# Patient Record
Sex: MALE | Race: BLACK OR AFRICAN AMERICAN | NOT HISPANIC OR LATINO | Employment: OTHER | URBAN - METROPOLITAN AREA
[De-identification: names, ages, dates, MRNs, and addresses within clinical notes are randomized per-mention and may not be internally consistent; named-entity substitution may affect disease eponyms.]

---

## 2017-02-16 RX ORDER — TADALAFIL 20 MG
TABLET ORAL
Qty: 9 TABLET | Refills: 0 | Status: SHIPPED | OUTPATIENT
Start: 2017-02-16 | End: 2018-03-07 | Stop reason: SDUPTHER

## 2018-03-07 ENCOUNTER — TELEPHONE (OUTPATIENT)
Dept: FAMILY MEDICINE CLINIC | Facility: CLINIC | Age: 58
End: 2018-03-07

## 2018-03-07 ENCOUNTER — OFFICE VISIT (OUTPATIENT)
Dept: FAMILY MEDICINE CLINIC | Facility: CLINIC | Age: 58
End: 2018-03-07

## 2018-03-07 VITALS
BODY MASS INDEX: 25.84 KG/M2 | OXYGEN SATURATION: 99 % | WEIGHT: 218.8 LBS | TEMPERATURE: 97.4 F | HEIGHT: 77 IN | DIASTOLIC BLOOD PRESSURE: 88 MMHG | SYSTOLIC BLOOD PRESSURE: 138 MMHG | HEART RATE: 95 BPM

## 2018-03-07 DIAGNOSIS — I10 ESSENTIAL HYPERTENSION: ICD-10-CM

## 2018-03-07 DIAGNOSIS — N52.9 ERECTILE DYSFUNCTION, UNSPECIFIED ERECTILE DYSFUNCTION TYPE: ICD-10-CM

## 2018-03-07 DIAGNOSIS — Z72.0 TOBACCO ABUSE: ICD-10-CM

## 2018-03-07 DIAGNOSIS — J44.9 CHRONIC OBSTRUCTIVE PULMONARY DISEASE, UNSPECIFIED COPD TYPE (HCC): ICD-10-CM

## 2018-03-07 DIAGNOSIS — R74.8 ELEVATED LIVER ENZYMES: ICD-10-CM

## 2018-03-07 DIAGNOSIS — E87.1 HYPONATREMIA: ICD-10-CM

## 2018-03-07 DIAGNOSIS — R73.9 HYPERGLYCEMIA: Primary | ICD-10-CM

## 2018-03-07 DIAGNOSIS — R06.02 SHORTNESS OF BREATH: ICD-10-CM

## 2018-03-07 DIAGNOSIS — J20.9 ACUTE BRONCHITIS, UNSPECIFIED ORGANISM: Primary | ICD-10-CM

## 2018-03-07 LAB
ALBUMIN SERPL-MCNC: 3.8 G/DL (ref 3.5–5.2)
ALBUMIN/GLOB SERPL: 1.3 G/DL
ALP SERPL-CCNC: 69 U/L (ref 39–117)
ALT SERPL W P-5'-P-CCNC: 77 U/L (ref 1–41)
ANION GAP SERPL CALCULATED.3IONS-SCNC: 10 MMOL/L
AST SERPL-CCNC: 39 U/L (ref 1–40)
BASOPHILS # BLD AUTO: 0.03 10*3/MM3 (ref 0–0.2)
BASOPHILS NFR BLD AUTO: 0.4 % (ref 0–1.5)
BILIRUB SERPL-MCNC: 1.4 MG/DL (ref 0.1–1.2)
BUN BLD-MCNC: 11 MG/DL (ref 6–20)
BUN/CREAT SERPL: 8.2 (ref 7–25)
CALCIUM SPEC-SCNC: 9.6 MG/DL (ref 8.6–10.5)
CHLORIDE SERPL-SCNC: 92 MMOL/L (ref 98–107)
CHOLEST SERPL-MCNC: 137 MG/DL (ref 0–200)
CO2 SERPL-SCNC: 27 MMOL/L (ref 22–29)
CREAT BLD-MCNC: 1.34 MG/DL (ref 0.76–1.27)
DEPRECATED RDW RBC AUTO: 45.2 FL (ref 37–54)
EOSINOPHIL # BLD AUTO: 0.08 10*3/MM3 (ref 0–0.7)
EOSINOPHIL NFR BLD AUTO: 1.2 % (ref 0.3–6.2)
ERYTHROCYTE [DISTWIDTH] IN BLOOD BY AUTOMATED COUNT: 13.2 % (ref 11.5–14.5)
GFR SERPL CREATININE-BSD FRML MDRD: 67 ML/MIN/1.73
GLOBULIN UR ELPH-MCNC: 3 GM/DL
GLUCOSE BLD-MCNC: 122 MG/DL (ref 65–99)
HBA1C MFR BLD: 5.11 % (ref 4.8–5.6)
HCT VFR BLD AUTO: 39.3 % (ref 40.4–52.2)
HDLC SERPL-MCNC: 76 MG/DL (ref 40–60)
HGB BLD-MCNC: 13.1 G/DL (ref 13.7–17.6)
IMM GRANULOCYTES # BLD: 0.02 10*3/MM3 (ref 0–0.03)
IMM GRANULOCYTES NFR BLD: 0.3 % (ref 0–0.5)
LDLC SERPL CALC-MCNC: 52 MG/DL (ref 0–100)
LDLC/HDLC SERPL: 0.69 {RATIO}
LYMPHOCYTES # BLD AUTO: 1.93 10*3/MM3 (ref 0.9–4.8)
LYMPHOCYTES NFR BLD AUTO: 28.9 % (ref 19.6–45.3)
MCH RBC QN AUTO: 31.2 PG (ref 27–32.7)
MCHC RBC AUTO-ENTMCNC: 33.3 G/DL (ref 32.6–36.4)
MCV RBC AUTO: 93.6 FL (ref 79.8–96.2)
MONOCYTES # BLD AUTO: 0.96 10*3/MM3 (ref 0.2–1.2)
MONOCYTES NFR BLD AUTO: 14.4 % (ref 5–12)
NEUTROPHILS # BLD AUTO: 3.65 10*3/MM3 (ref 1.9–8.1)
NEUTROPHILS NFR BLD AUTO: 54.8 % (ref 42.7–76)
NT-PROBNP SERPL-MCNC: 4291 PG/ML (ref 5–900)
PLATELET # BLD AUTO: 271 10*3/MM3 (ref 140–500)
PMV BLD AUTO: 9.9 FL (ref 6–12)
POTASSIUM BLD-SCNC: 4.2 MMOL/L (ref 3.5–5.2)
PROT SERPL-MCNC: 6.8 G/DL (ref 6–8.5)
RBC # BLD AUTO: 4.2 10*6/MM3 (ref 4.6–6)
SODIUM BLD-SCNC: 129 MMOL/L (ref 136–145)
TRIGL SERPL-MCNC: 43 MG/DL (ref 0–150)
VLDLC SERPL-MCNC: 8.6 MG/DL (ref 5–40)
WBC NRBC COR # BLD: 6.67 10*3/MM3 (ref 4.5–10.7)

## 2018-03-07 PROCEDURE — 80053 COMPREHEN METABOLIC PANEL: CPT | Performed by: NURSE PRACTITIONER

## 2018-03-07 PROCEDURE — 85025 COMPLETE CBC W/AUTO DIFF WBC: CPT | Performed by: NURSE PRACTITIONER

## 2018-03-07 PROCEDURE — 99214 OFFICE O/P EST MOD 30 MIN: CPT | Performed by: NURSE PRACTITIONER

## 2018-03-07 PROCEDURE — 71046 X-RAY EXAM CHEST 2 VIEWS: CPT | Performed by: NURSE PRACTITIONER

## 2018-03-07 PROCEDURE — 83880 ASSAY OF NATRIURETIC PEPTIDE: CPT | Performed by: NURSE PRACTITIONER

## 2018-03-07 PROCEDURE — 36415 COLL VENOUS BLD VENIPUNCTURE: CPT | Performed by: NURSE PRACTITIONER

## 2018-03-07 PROCEDURE — 80061 LIPID PANEL: CPT | Performed by: NURSE PRACTITIONER

## 2018-03-07 PROCEDURE — 83036 HEMOGLOBIN GLYCOSYLATED A1C: CPT | Performed by: NURSE PRACTITIONER

## 2018-03-07 RX ORDER — TADALAFIL 20 MG/1
20 TABLET ORAL DAILY PRN
Qty: 9 TABLET | Refills: 5 | Status: SHIPPED | OUTPATIENT
Start: 2018-03-07 | End: 2018-08-10 | Stop reason: SDUPTHER

## 2018-03-07 RX ORDER — BUPROPION HYDROCHLORIDE 150 MG/1
TABLET, EXTENDED RELEASE ORAL
Qty: 60 TABLET | Refills: 1 | Status: ON HOLD | OUTPATIENT
Start: 2018-03-07 | End: 2018-04-05 | Stop reason: SINTOL

## 2018-03-07 RX ORDER — AMLODIPINE BESYLATE 10 MG/1
10 TABLET ORAL DAILY
Qty: 30 TABLET | Refills: 5 | Status: ON HOLD | OUTPATIENT
Start: 2018-03-07 | End: 2018-04-05 | Stop reason: SINTOL

## 2018-03-07 NOTE — TELEPHONE ENCOUNTER
Your sodium is low 129 normal 135-145, your kidney function is sl decreased, make sure you are drinking 8 glasses H20 daily and healthy regular meals and RTC Friday lab only recheck. Chol well controlled. Your liver enzymes is sl elevated, do you take tylenol or drink alcohol regularly? Your BS sl elevated, enc healthy diet and regular exercise, no prediabetes or DM

## 2018-03-07 NOTE — PATIENT INSTRUCTIONS
check labs and call with results, refill amlodipine 10 mg daily and check BP at home, enc smoking cessation and trial wellbutrin  mg 1 PO AM x 3 days then BID, finish doxycycline and cont albuterol prn, trial breo inhaler 1 puff daily and gave samples and coupon, CXR today, call or RTC if sx persist or worsen

## 2018-03-07 NOTE — PROGRESS NOTES
Subjective   Hernando Caro is a 57 y.o. male.     History of Present Illness   Here to FU on bronchitis started having sx 4 wks ago saw [facility] and tx inhaler and amox helped for a while but sx never resolved, then went to the [facility] 1 wk ago medrol dose pack and doxycycline 100 BID, with some cont SOA and has to sleep, smoker 1/2 ppd x 20 years and not currently smoking, desires quitting, with HTN on amlodipine 10 mg daily fasting for labs today, no CP dizziness HA LE edema, no recent CXR but states heart enlarged prev with normal stress test, request refill cialis prn ED, last colonoscopy age 50 FU 10 years    The following portions of the patient's history were reviewed and updated as appropriate: allergies, current medications, past family history, past medical history, past social history, past surgical history and problem list.    Review of Systems   Constitutional: Negative for fever.   HENT: Positive for congestion and postnasal drip. Negative for dental problem, drooling, ear discharge, ear pain, facial swelling, hearing loss, mouth sores, nosebleeds, rhinorrhea, sinus pain, sinus pressure, sneezing, sore throat, tinnitus, trouble swallowing and voice change.    Respiratory: Positive for cough, chest tightness, shortness of breath and wheezing.    Cardiovascular: Negative for chest pain, palpitations and leg swelling.   Allergic/Immunologic: Positive for environmental allergies.   Neurological: Negative for dizziness and headaches.   All other systems reviewed and are negative.      Objective   Physical Exam   Constitutional: He is oriented to person, place, and time. He appears well-developed and well-nourished.   HENT:   Head: Normocephalic and atraumatic.   Eyes: Conjunctivae and EOM are normal. Pupils are equal, round, and reactive to light.   Neck: Normal range of motion. Neck supple. No thyromegaly present.   Cardiovascular: Normal rate, regular rhythm and normal heart sounds.     Pulmonary/Chest: Effort normal and breath sounds normal.   Musculoskeletal: Normal range of motion. He exhibits no edema (B LE).   Lymphadenopathy:     He has no cervical adenopathy.   Neurological: He is alert and oriented to person, place, and time.   Skin: Skin is warm and dry.   Psychiatric: He has a normal mood and affect. His behavior is normal. Judgment and thought content normal.   Vitals reviewed.  chest xray 2v without comparison for SOA, wheezing, coughing shows COPD, some heart enlargement, awaiting radiology review    Assessment/Plan   Hernando was seen today for bronchitis.    Diagnoses and all orders for this visit:    Acute bronchitis, unspecified organism  -     CBC & Differential  -     XR Chest PA & Lateral  -     CBC Auto Differential    Essential hypertension  -     Comprehensive Metabolic Panel  -     Lipid Panel  -     proBNP    Tobacco abuse  -     Lipid Panel    Shortness of breath    Chronic obstructive pulmonary disease, unspecified COPD type    Erectile dysfunction, unspecified erectile dysfunction type    Other orders  -     buPROPion SR (WELLBUTRIN SR) 150 MG 12 hr tablet; 1 PO AM x 3 days, then 1 PO BID  -     amLODIPine (NORVASC) 10 MG tablet; Take 1 tablet by mouth Daily.  -     tadalafil (CIALIS) 20 MG tablet; Take 1 tablet by mouth Daily As Needed for erectile dysfunction.    check labs and call with results, refill amlodipine 10 mg daily and check BP at home, enc smoking cessation and trial wellbutrin  mg 1 PO AM x 3 days then BID, finish doxycycline and cont albuterol prn, trial breo inhaler 1 puff daily and gave samples and coupon, CXR today, call or RTC if sx persist or worsen

## 2018-03-08 ENCOUNTER — TELEPHONE (OUTPATIENT)
Dept: FAMILY MEDICINE CLINIC | Facility: CLINIC | Age: 58
End: 2018-03-08

## 2018-03-08 NOTE — TELEPHONE ENCOUNTER
A WOMAN CALLED FROM THE NUMBER ON THIS PT'S CHART SAYING THAT THIS ISN'T HIS NUMBER    SHE DOES NOT KNOW THE PT FYI, BUT THAT THEY WERE LEAVING VOICE MAILS FROM NANCY SYLVESTER'S OFFICE    HIS EMERGENCY CONTACT IN HIS CHART DOES HAVE A DIFFERENT NUMBER IF SOMEONE NEEDS TO GET IN TOUCH WITH HIM THROUGH HER POSSIBLY    JENNIFER VICTOR [phone]

## 2018-03-12 ENCOUNTER — TELEPHONE (OUTPATIENT)
Dept: FAMILY MEDICINE CLINIC | Facility: CLINIC | Age: 58
End: 2018-03-12

## 2018-03-12 NOTE — TELEPHONE ENCOUNTER
Fluid volume in blood elevated, showing retaining fluid, please make FU apt this week to discuss and will need to start patient on diuretics, how is he feeling?

## 2018-03-14 ENCOUNTER — OFFICE VISIT (OUTPATIENT)
Dept: FAMILY MEDICINE CLINIC | Facility: CLINIC | Age: 58
End: 2018-03-14

## 2018-03-14 VITALS
DIASTOLIC BLOOD PRESSURE: 76 MMHG | HEART RATE: 95 BPM | BODY MASS INDEX: 25.27 KG/M2 | TEMPERATURE: 98.4 F | SYSTOLIC BLOOD PRESSURE: 148 MMHG | OXYGEN SATURATION: 96 % | HEIGHT: 77 IN | WEIGHT: 214 LBS

## 2018-03-14 DIAGNOSIS — E87.1 HYPONATREMIA: ICD-10-CM

## 2018-03-14 DIAGNOSIS — R74.8 ELEVATED LIVER ENZYMES: ICD-10-CM

## 2018-03-14 DIAGNOSIS — Z11.59 ENCOUNTER FOR HEPATITIS C SCREENING TEST FOR LOW RISK PATIENT: ICD-10-CM

## 2018-03-14 DIAGNOSIS — R35.0 URINE FREQUENCY: ICD-10-CM

## 2018-03-14 DIAGNOSIS — Z12.5 PROSTATE CANCER SCREENING: ICD-10-CM

## 2018-03-14 DIAGNOSIS — R06.02 SHORTNESS OF BREATH: ICD-10-CM

## 2018-03-14 DIAGNOSIS — I10 ESSENTIAL HYPERTENSION: Primary | ICD-10-CM

## 2018-03-14 DIAGNOSIS — Z78.9 ALCOHOL USE: ICD-10-CM

## 2018-03-14 DIAGNOSIS — R79.89 ELEVATED BRAIN NATRIURETIC PEPTIDE (BNP) LEVEL: ICD-10-CM

## 2018-03-14 LAB
ALBUMIN SERPL-MCNC: 3.6 G/DL (ref 3.5–5.2)
ALBUMIN/GLOB SERPL: 1.1 G/DL
ALP SERPL-CCNC: 82 U/L (ref 39–117)
ALT SERPL W P-5'-P-CCNC: 45 U/L (ref 1–41)
ANION GAP SERPL CALCULATED.3IONS-SCNC: 13.8 MMOL/L
AST SERPL-CCNC: 28 U/L (ref 1–40)
BILIRUB SERPL-MCNC: 1 MG/DL (ref 0.1–1.2)
BUN BLD-MCNC: 11 MG/DL (ref 6–20)
BUN/CREAT SERPL: 9.4 (ref 7–25)
CALCIUM SPEC-SCNC: 9.2 MG/DL (ref 8.6–10.5)
CHLORIDE SERPL-SCNC: 93 MMOL/L (ref 98–107)
CO2 SERPL-SCNC: 25.2 MMOL/L (ref 22–29)
CREAT BLD-MCNC: 1.17 MG/DL (ref 0.76–1.27)
GFR SERPL CREATININE-BSD FRML MDRD: 78 ML/MIN/1.73
GLOBULIN UR ELPH-MCNC: 3.4 GM/DL
GLUCOSE BLD-MCNC: 81 MG/DL (ref 65–99)
HAV IGM SERPL QL IA: NORMAL
HBV CORE IGM SERPL QL IA: NORMAL
HBV SURFACE AG SERPL QL IA: NORMAL
HCV AB SER DONR QL: NORMAL
POTASSIUM BLD-SCNC: 4 MMOL/L (ref 3.5–5.2)
PROT SERPL-MCNC: 7 G/DL (ref 6–8.5)
PSA SERPL-MCNC: 0.66 NG/ML (ref 0–4)
SODIUM BLD-SCNC: 132 MMOL/L (ref 136–145)

## 2018-03-14 PROCEDURE — 80074 ACUTE HEPATITIS PANEL: CPT | Performed by: NURSE PRACTITIONER

## 2018-03-14 PROCEDURE — G0103 PSA SCREENING: HCPCS | Performed by: NURSE PRACTITIONER

## 2018-03-14 PROCEDURE — 99214 OFFICE O/P EST MOD 30 MIN: CPT | Performed by: NURSE PRACTITIONER

## 2018-03-14 PROCEDURE — 36415 COLL VENOUS BLD VENIPUNCTURE: CPT | Performed by: NURSE PRACTITIONER

## 2018-03-14 PROCEDURE — 80053 COMPREHEN METABOLIC PANEL: CPT | Performed by: NURSE PRACTITIONER

## 2018-03-14 RX ORDER — FUROSEMIDE 20 MG/1
20 TABLET ORAL DAILY
Qty: 30 TABLET | Refills: 1 | Status: SHIPPED | OUTPATIENT
Start: 2018-03-14 | End: 2018-04-06 | Stop reason: HOSPADM

## 2018-03-14 RX ORDER — POTASSIUM CHLORIDE 750 MG/1
10 TABLET, EXTENDED RELEASE ORAL DAILY
Qty: 30 TABLET | Refills: 1 | Status: SHIPPED | OUTPATIENT
Start: 2018-03-14 | End: 2018-04-13

## 2018-03-14 NOTE — PROGRESS NOTES
Subjective   Hernando Caro is a 57 y.o. male.     History of Present Illness   Here to FU on cont SOA and HTN, states had swelling over the weekend and stopped amlodipine 10 mg daily, no CP dizziness HA LE edema, with hx of SE lisinopril angioedema, also stopped wellbutrin and still smoking, with last labs last week showing BNP > 4000, no hx of CHF or fam hx of CHF, last echo and stress test 2015 normal, with CXR showing cardiomegaly, with cont SOA and PATEL but states slowly improving, never took diuretic, with increased urin and nocturia, last PSA > 1 year, no fam hx of prostate ca, last colonoscopy age 50 FU 10 years, no abd pain, distension, NV, diarrhea, constipation or blood in stool, with labs showing low sodium, elevated creatinine and ALT, states drinks beer daily, no tylenol    The following portions of the patient's history were reviewed and updated as appropriate: allergies, current medications, past family history, past medical history, past social history, past surgical history and problem list.    Review of Systems   Constitutional: Negative for fever.   Respiratory: Positive for shortness of breath. Negative for cough and wheezing.    Cardiovascular: Negative for chest pain, palpitations and leg swelling.   Gastrointestinal: Negative for abdominal distention, abdominal pain, anal bleeding, blood in stool, constipation, diarrhea, nausea, rectal pain and vomiting.   Genitourinary: Positive for frequency and urgency. Negative for decreased urine volume, difficulty urinating, discharge, dysuria, enuresis, flank pain, genital sores, hematuria, penile pain, penile swelling, scrotal swelling and testicular pain.   Musculoskeletal: Negative for arthralgias, back pain, gait problem, joint swelling, myalgias, neck pain and neck stiffness.   Neurological: Negative for dizziness and headaches.   All other systems reviewed and are negative.      Objective   Physical Exam   Constitutional: He is oriented to person,  place, and time. He appears well-developed and well-nourished.   HENT:   Head: Normocephalic and atraumatic.   Eyes: Conjunctivae and EOM are normal. Pupils are equal, round, and reactive to light.   Cardiovascular: Normal rate, regular rhythm and normal heart sounds.    Pulmonary/Chest: Effort normal and breath sounds normal.   Abdominal: Soft. He exhibits no distension and no mass. There is no tenderness. There is no rebound and no guarding. No hernia.   Genitourinary: Prostate normal.   Musculoskeletal: Normal range of motion. He exhibits no edema (B LE).   Neurological: He is alert and oriented to person, place, and time.   Skin: Skin is warm and dry.   Psychiatric: He has a normal mood and affect. His behavior is normal. Judgment and thought content normal.   Vitals reviewed.      Assessment/Plan   Hernando was seen today for follow-up.    Diagnoses and all orders for this visit:    Essential hypertension  -     Adult Transthoracic Echo Complete W/ Cont if Necessary Per Protocol; Future    Elevated brain natriuretic peptide (BNP) level  -     Adult Transthoracic Echo Complete W/ Cont if Necessary Per Protocol; Future    Shortness of breath  -     Adult Transthoracic Echo Complete W/ Cont if Necessary Per Protocol; Future    Encounter for hepatitis C screening test for low risk patient    Prostate cancer screening  -     PSA Screen    Hyponatremia  -     Comprehensive Metabolic Panel    Elevated liver enzymes  -     Hepatitis Panel, Acute    Urine frequency    Alcohol use    Other orders  -     furosemide (LASIX) 20 MG tablet; Take 1 tablet by mouth Daily.  -     potassium chloride (K-DUR,KLOR-CON) 10 MEQ CR tablet; Take 1 tablet by mouth Daily.    start lasix and potassium, check BP at home and call with log in 1 wk, order echo, YUNIEL today and check labs and call with results, enc decrease alcohol consumption

## 2018-03-14 NOTE — PATIENT INSTRUCTIONS
start lasix and potassium, check BP at home and call with log in 1 wk, order echo, YUNIEL today and check labs and call with results, enc decrease alcohol consumption

## 2018-03-16 ENCOUNTER — TELEPHONE (OUTPATIENT)
Dept: FAMILY MEDICINE CLINIC | Facility: CLINIC | Age: 58
End: 2018-03-16

## 2018-03-16 NOTE — TELEPHONE ENCOUNTER
----- Message from JANET Soriano sent at 3/16/2018  9:36 AM EDT -----  Hepatitis screen normal, negative. Liver enzyme almost back to normal, enc decrease alcohol consumption and we can monitor. Prostate lab normal    Pt informed

## 2018-03-26 ENCOUNTER — HOSPITAL ENCOUNTER (OUTPATIENT)
Dept: CARDIOLOGY | Facility: HOSPITAL | Age: 58
Discharge: HOME | End: 2018-03-26
Admitting: NURSE PRACTITIONER

## 2018-03-26 ENCOUNTER — OFFICE VISIT (OUTPATIENT)
Dept: CARDIOLOGY | Facility: CLINIC | Age: 58
End: 2018-03-26

## 2018-03-26 VITALS
BODY MASS INDEX: 26.06 KG/M2 | WEIGHT: 214 LBS | DIASTOLIC BLOOD PRESSURE: 80 MMHG | HEIGHT: 76 IN | OXYGEN SATURATION: 99 % | SYSTOLIC BLOOD PRESSURE: 120 MMHG | HEART RATE: 95 BPM

## 2018-03-26 VITALS
HEIGHT: 76 IN | WEIGHT: 214 LBS | SYSTOLIC BLOOD PRESSURE: 118 MMHG | BODY MASS INDEX: 26.06 KG/M2 | HEART RATE: 105 BPM | DIASTOLIC BLOOD PRESSURE: 62 MMHG

## 2018-03-26 DIAGNOSIS — I10 ESSENTIAL HYPERTENSION: ICD-10-CM

## 2018-03-26 DIAGNOSIS — I42.9 CARDIOMYOPATHY, UNSPECIFIED TYPE (HCC): Primary | ICD-10-CM

## 2018-03-26 DIAGNOSIS — R79.89 ELEVATED BRAIN NATRIURETIC PEPTIDE (BNP) LEVEL: ICD-10-CM

## 2018-03-26 DIAGNOSIS — R06.02 SHORTNESS OF BREATH: ICD-10-CM

## 2018-03-26 PROBLEM — I34.0 MODERATE MITRAL REGURGITATION: Status: ACTIVE | Noted: 2018-03-26

## 2018-03-26 PROBLEM — I07.1 MODERATE TRICUSPID REGURGITATION: Status: ACTIVE | Noted: 2018-03-26

## 2018-03-26 PROBLEM — I50.40 COMBINED SYSTOLIC AND DIASTOLIC CONGESTIVE HEART FAILURE (HCC): Status: ACTIVE | Noted: 2018-03-26

## 2018-03-26 LAB
ASCENDING AORTA: 3.8 CM
BH CV ECHO MEAS - ACS: 2.6 CM
BH CV ECHO MEAS - AO MAX PG (FULL): 3.4 MMHG
BH CV ECHO MEAS - AO MAX PG: 6 MMHG
BH CV ECHO MEAS - AO MEAN PG (FULL): 2.2 MMHG
BH CV ECHO MEAS - AO MEAN PG: 3.9 MMHG
BH CV ECHO MEAS - AO ROOT AREA (BSA CORRECTED): 1.7
BH CV ECHO MEAS - AO ROOT AREA: 12.7 CM^2
BH CV ECHO MEAS - AO ROOT DIAM: 4 CM
BH CV ECHO MEAS - AO V2 MAX: 122.8 CM/SEC
BH CV ECHO MEAS - AO V2 MEAN: 96.2 CM/SEC
BH CV ECHO MEAS - AO V2 VTI: 20.3 CM
BH CV ECHO MEAS - AVA(I,A): 1.7 CM^2
BH CV ECHO MEAS - AVA(I,D): 1.7 CM^2
BH CV ECHO MEAS - AVA(V,A): 1.8 CM^2
BH CV ECHO MEAS - AVA(V,D): 1.8 CM^2
BH CV ECHO MEAS - BSA(HAYCOCK): 2.3 M^2
BH CV ECHO MEAS - BSA: 2.3 M^2
BH CV ECHO MEAS - BZI_BMI: 25.4 KILOGRAMS/M^2
BH CV ECHO MEAS - BZI_METRIC_HEIGHT: 195.6 CM
BH CV ECHO MEAS - BZI_METRIC_WEIGHT: 97.1 KG
BH CV ECHO MEAS - CONTRAST EF (2CH): 32.9 ML/M^2
BH CV ECHO MEAS - CONTRAST EF 4CH: 35 ML/M^2
BH CV ECHO MEAS - EDV(MOD-SP2): 167 ML
BH CV ECHO MEAS - EDV(MOD-SP4): 183 ML
BH CV ECHO MEAS - EDV(TEICH): 216.7 ML
BH CV ECHO MEAS - EF(CUBED): 55.7 %
BH CV ECHO MEAS - EF(MOD-SP2): 32.9 %
BH CV ECHO MEAS - EF(MOD-SP4): 35 %
BH CV ECHO MEAS - EF(TEICH): 46.4 %
BH CV ECHO MEAS - ESV(MOD-SP2): 112 ML
BH CV ECHO MEAS - ESV(MOD-SP4): 119 ML
BH CV ECHO MEAS - ESV(TEICH): 116.2 ML
BH CV ECHO MEAS - FS: 23.8 %
BH CV ECHO MEAS - IVS/LVPW: 1
BH CV ECHO MEAS - IVSD: 1.1 CM
BH CV ECHO MEAS - LAT PEAK E' VEL: 11 CM/SEC
BH CV ECHO MEAS - LV DIASTOLIC VOL/BSA (35-75): 79.5 ML/M^2
BH CV ECHO MEAS - LV MASS(C)D: 297.4 GRAMS
BH CV ECHO MEAS - LV MASS(C)DI: 129.2 GRAMS/M^2
BH CV ECHO MEAS - LV MAX PG: 2.7 MMHG
BH CV ECHO MEAS - LV MEAN PG: 1.7 MMHG
BH CV ECHO MEAS - LV SYSTOLIC VOL/BSA (12-30): 51.7 ML/M^2
BH CV ECHO MEAS - LV V1 MAX: 81.5 CM/SEC
BH CV ECHO MEAS - LV V1 MEAN: 61.4 CM/SEC
BH CV ECHO MEAS - LV V1 VTI: 12.8 CM
BH CV ECHO MEAS - LVIDD: 6.5 CM
BH CV ECHO MEAS - LVIDS: 5 CM
BH CV ECHO MEAS - LVLD AP2: 9.5 CM
BH CV ECHO MEAS - LVLD AP4: 9.1 CM
BH CV ECHO MEAS - LVLS AP2: 9.2 CM
BH CV ECHO MEAS - LVLS AP4: 7.6 CM
BH CV ECHO MEAS - LVOT AREA (M): 2.5 CM^2
BH CV ECHO MEAS - LVOT AREA: 2.7 CM^2
BH CV ECHO MEAS - LVOT DIAM: 1.8 CM
BH CV ECHO MEAS - LVPWD: 1 CM
BH CV ECHO MEAS - MED PEAK E' VEL: 7 CM/SEC
BH CV ECHO MEAS - MR MAX PG: 89.7 MMHG
BH CV ECHO MEAS - MR MAX VEL: 473.5 CM/SEC
BH CV ECHO MEAS - MR MEAN PG: 64.8 MMHG
BH CV ECHO MEAS - MR MEAN VEL: 388.9 CM/SEC
BH CV ECHO MEAS - MR VTI: 126.3 CM
BH CV ECHO MEAS - MV A DUR: 0.1 SEC
BH CV ECHO MEAS - MV A MAX VEL: 32.5 CM/SEC
BH CV ECHO MEAS - MV DEC SLOPE: 418 CM/SEC^2
BH CV ECHO MEAS - MV DEC TIME: 0.15 SEC
BH CV ECHO MEAS - MV E MAX VEL: 64 CM/SEC
BH CV ECHO MEAS - MV E/A: 2
BH CV ECHO MEAS - MV MAX PG: 5.2 MMHG
BH CV ECHO MEAS - MV MEAN PG: 1.1 MMHG
BH CV ECHO MEAS - MV P1/2T MAX VEL: 64 CM/SEC
BH CV ECHO MEAS - MV P1/2T: 44.9 MSEC
BH CV ECHO MEAS - MV V2 MAX: 114 CM/SEC
BH CV ECHO MEAS - MV V2 MEAN: 36.4 CM/SEC
BH CV ECHO MEAS - MV V2 VTI: 10.4 CM
BH CV ECHO MEAS - MVA P1/2T LCG: 3.4 CM^2
BH CV ECHO MEAS - MVA(P1/2T): 4.9 CM^2
BH CV ECHO MEAS - MVA(VTI): 3.3 CM^2
BH CV ECHO MEAS - PA ACC TIME: 0.1 SEC
BH CV ECHO MEAS - PA MAX PG (FULL): 2.5 MMHG
BH CV ECHO MEAS - PA MAX PG: 3.7 MMHG
BH CV ECHO MEAS - PA PR(ACCEL): 32.7 MMHG
BH CV ECHO MEAS - PA V2 MAX: 96 CM/SEC
BH CV ECHO MEAS - PI END-D VEL: 138.2 CM/SEC
BH CV ECHO MEAS - PULM A REVS DUR: 0.09 SEC
BH CV ECHO MEAS - PULM A REVS VEL: 22.2 CM/SEC
BH CV ECHO MEAS - PULM DIAS VEL: 75.1 CM/SEC
BH CV ECHO MEAS - PULM S/D: 0.46
BH CV ECHO MEAS - PULM SYS VEL: 34.6 CM/SEC
BH CV ECHO MEAS - PVA(V,A): 2.9 CM^2
BH CV ECHO MEAS - PVA(V,D): 2.9 CM^2
BH CV ECHO MEAS - QP/QS: 1.3
BH CV ECHO MEAS - RAP SYSTOLE: 15 MMHG
BH CV ECHO MEAS - RV MAX PG: 1.2 MMHG
BH CV ECHO MEAS - RV MEAN PG: 0.72 MMHG
BH CV ECHO MEAS - RV V1 MAX: 54.3 CM/SEC
BH CV ECHO MEAS - RV V1 MEAN: 40.1 CM/SEC
BH CV ECHO MEAS - RV V1 VTI: 8.7 CM
BH CV ECHO MEAS - RVOT AREA: 5.1 CM^2
BH CV ECHO MEAS - RVOT DIAM: 2.6 CM
BH CV ECHO MEAS - RVSP: 78 MMHG
BH CV ECHO MEAS - SI(AO): 112.5 ML/M^2
BH CV ECHO MEAS - SI(CUBED): 66.7 ML/M^2
BH CV ECHO MEAS - SI(LVOT): 14.9 ML/M^2
BH CV ECHO MEAS - SI(MOD-SP2): 23.9 ML/M^2
BH CV ECHO MEAS - SI(MOD-SP4): 27.8 ML/M^2
BH CV ECHO MEAS - SI(TEICH): 43.6 ML/M^2
BH CV ECHO MEAS - SUP REN AO DIAM: 2.3 CM
BH CV ECHO MEAS - SV(AO): 259 ML
BH CV ECHO MEAS - SV(CUBED): 153.5 ML
BH CV ECHO MEAS - SV(LVOT): 34.4 ML
BH CV ECHO MEAS - SV(MOD-SP2): 55 ML
BH CV ECHO MEAS - SV(MOD-SP4): 64 ML
BH CV ECHO MEAS - SV(RVOT): 44.5 ML
BH CV ECHO MEAS - SV(TEICH): 100.5 ML
BH CV ECHO MEAS - TAPSE (>1.6): 1.3 CM2
BH CV ECHO MEAS - TR MAX VEL: 396.7 CM/SEC
BH CV XLRA - RV BASE: 4.3 CM
BH CV XLRA - TDI S': 11 CM/SEC
E/E' RATIO: 7.5
LEFT ATRIUM VOLUME INDEX: 35 ML/M2
MAXIMAL PREDICTED HEART RATE: 163 BPM
SINUS: 4.1 CM
STJ: 3.8 CM
STRESS TARGET HR: 139 BPM

## 2018-03-26 PROCEDURE — 93306 TTE W/DOPPLER COMPLETE: CPT

## 2018-03-26 PROCEDURE — 93306 TTE W/DOPPLER COMPLETE: CPT | Performed by: INTERNAL MEDICINE

## 2018-03-26 PROCEDURE — 0399T HC MYOCARDL STRAIN IMAG QUAN ASSMT PER SESS: CPT

## 2018-03-26 PROCEDURE — 99244 OFF/OP CNSLTJ NEW/EST MOD 40: CPT | Performed by: INTERNAL MEDICINE

## 2018-03-26 PROCEDURE — 0399T ADULT TRANSTHORACIC ECHO COMPLETE W/ CONT IF NECESSARY PER PROTOCOL: CPT | Performed by: INTERNAL MEDICINE

## 2018-03-30 ENCOUNTER — HOSPITAL ENCOUNTER (OUTPATIENT)
Facility: HOSPITAL | Age: 58
Setting detail: HOSPITAL OUTPATIENT SURGERY
End: 2018-03-30
Attending: INTERNAL MEDICINE | Admitting: INTERNAL MEDICINE

## 2018-03-30 DIAGNOSIS — I42.9 CARDIOMYOPATHY, UNSPECIFIED TYPE (HCC): ICD-10-CM

## 2018-04-02 ENCOUNTER — HOSPITAL ENCOUNTER (INPATIENT)
Facility: HOSPITAL | Age: 58
LOS: 3 days | Discharge: HOME | End: 2018-04-06
Attending: EMERGENCY MEDICINE | Admitting: INTERNAL MEDICINE

## 2018-04-02 ENCOUNTER — APPOINTMENT (OUTPATIENT)
Dept: GENERAL RADIOLOGY | Facility: HOSPITAL | Age: 58
End: 2018-04-02

## 2018-04-02 ENCOUNTER — APPOINTMENT (OUTPATIENT)
Dept: CT IMAGING | Facility: HOSPITAL | Age: 58
End: 2018-04-02

## 2018-04-02 ENCOUNTER — TELEPHONE (OUTPATIENT)
Dept: CARDIOLOGY | Facility: CLINIC | Age: 58
End: 2018-04-02

## 2018-04-02 DIAGNOSIS — J90 PLEURAL EFFUSION, RIGHT: ICD-10-CM

## 2018-04-02 DIAGNOSIS — I48.92 NEW ONSET ATRIAL FLUTTER (HCC): Primary | ICD-10-CM

## 2018-04-02 DIAGNOSIS — I42.0 DILATED CARDIOMYOPATHY (HCC): ICD-10-CM

## 2018-04-02 DIAGNOSIS — R06.00 DYSPNEA, UNSPECIFIED TYPE: ICD-10-CM

## 2018-04-02 LAB
ALBUMIN SERPL-MCNC: 3.7 G/DL (ref 3.5–5.2)
ALBUMIN/GLOB SERPL: 1.4 G/DL
ALP SERPL-CCNC: 70 U/L (ref 39–117)
ALT SERPL W P-5'-P-CCNC: 32 U/L (ref 1–41)
ANION GAP SERPL CALCULATED.3IONS-SCNC: 13.7 MMOL/L
AST SERPL-CCNC: 33 U/L (ref 1–40)
BASOPHILS # BLD AUTO: 0.01 10*3/MM3 (ref 0–0.2)
BASOPHILS NFR BLD AUTO: 0.2 % (ref 0–1.5)
BILIRUB SERPL-MCNC: 0.8 MG/DL (ref 0.1–1.2)
BUN BLD-MCNC: 14 MG/DL (ref 6–20)
BUN/CREAT SERPL: 10.2 (ref 7–25)
CALCIUM SPEC-SCNC: 9 MG/DL (ref 8.6–10.5)
CHLORIDE SERPL-SCNC: 89 MMOL/L (ref 98–107)
CO2 SERPL-SCNC: 24.3 MMOL/L (ref 22–29)
CREAT BLD-MCNC: 1.37 MG/DL (ref 0.76–1.27)
D DIMER PPP FEU-MCNC: 3.4 MCGFEU/ML (ref 0–0.49)
DEPRECATED RDW RBC AUTO: 43.5 FL (ref 37–54)
EOSINOPHIL # BLD AUTO: 0.13 10*3/MM3 (ref 0–0.7)
EOSINOPHIL NFR BLD AUTO: 2 % (ref 0.3–6.2)
ERYTHROCYTE [DISTWIDTH] IN BLOOD BY AUTOMATED COUNT: 13.1 % (ref 11.5–14.5)
GFR SERPL CREATININE-BSD FRML MDRD: 65 ML/MIN/1.73
GLOBULIN UR ELPH-MCNC: 2.7 GM/DL
GLUCOSE BLD-MCNC: 83 MG/DL (ref 65–99)
HCT VFR BLD AUTO: 37.2 % (ref 40.4–52.2)
HGB BLD-MCNC: 12.6 G/DL (ref 13.7–17.6)
HOLD SPECIMEN: NORMAL
HOLD SPECIMEN: NORMAL
IMM GRANULOCYTES # BLD: 0 10*3/MM3 (ref 0–0.03)
IMM GRANULOCYTES NFR BLD: 0 % (ref 0–0.5)
LYMPHOCYTES # BLD AUTO: 2.17 10*3/MM3 (ref 0.9–4.8)
LYMPHOCYTES NFR BLD AUTO: 33.4 % (ref 19.6–45.3)
MCH RBC QN AUTO: 31.1 PG (ref 27–32.7)
MCHC RBC AUTO-ENTMCNC: 33.9 G/DL (ref 32.6–36.4)
MCV RBC AUTO: 91.9 FL (ref 79.8–96.2)
MONOCYTES # BLD AUTO: 0.72 10*3/MM3 (ref 0.2–1.2)
MONOCYTES NFR BLD AUTO: 11.1 % (ref 5–12)
NEUTROPHILS # BLD AUTO: 3.46 10*3/MM3 (ref 1.9–8.1)
NEUTROPHILS NFR BLD AUTO: 53.3 % (ref 42.7–76)
NT-PROBNP SERPL-MCNC: 3988 PG/ML (ref 5–900)
PLATELET # BLD AUTO: 229 10*3/MM3 (ref 140–500)
PMV BLD AUTO: 10.2 FL (ref 6–12)
POTASSIUM BLD-SCNC: 4.2 MMOL/L (ref 3.5–5.2)
PROT SERPL-MCNC: 6.4 G/DL (ref 6–8.5)
RBC # BLD AUTO: 4.05 10*6/MM3 (ref 4.6–6)
SODIUM BLD-SCNC: 127 MMOL/L (ref 136–145)
TROPONIN T SERPL-MCNC: 0.06 NG/ML (ref 0–0.03)
WBC NRBC COR # BLD: 6.49 10*3/MM3 (ref 4.5–10.7)
WHOLE BLOOD HOLD SPECIMEN: NORMAL
WHOLE BLOOD HOLD SPECIMEN: NORMAL

## 2018-04-02 PROCEDURE — 85025 COMPLETE CBC W/AUTO DIFF WBC: CPT | Performed by: EMERGENCY MEDICINE

## 2018-04-02 PROCEDURE — 85379 FIBRIN DEGRADATION QUANT: CPT | Performed by: EMERGENCY MEDICINE

## 2018-04-02 PROCEDURE — 93010 ELECTROCARDIOGRAM REPORT: CPT | Performed by: INTERNAL MEDICINE

## 2018-04-02 PROCEDURE — 80053 COMPREHEN METABOLIC PANEL: CPT | Performed by: EMERGENCY MEDICINE

## 2018-04-02 PROCEDURE — 0 IOPAMIDOL PER 1 ML: Performed by: EMERGENCY MEDICINE

## 2018-04-02 PROCEDURE — 93005 ELECTROCARDIOGRAM TRACING: CPT | Performed by: EMERGENCY MEDICINE

## 2018-04-02 PROCEDURE — 99284 EMERGENCY DEPT VISIT MOD MDM: CPT

## 2018-04-02 PROCEDURE — 71275 CT ANGIOGRAPHY CHEST: CPT

## 2018-04-02 PROCEDURE — 83880 ASSAY OF NATRIURETIC PEPTIDE: CPT | Performed by: EMERGENCY MEDICINE

## 2018-04-02 PROCEDURE — 84484 ASSAY OF TROPONIN QUANT: CPT | Performed by: EMERGENCY MEDICINE

## 2018-04-02 PROCEDURE — 93005 ELECTROCARDIOGRAM TRACING: CPT

## 2018-04-02 PROCEDURE — 71046 X-RAY EXAM CHEST 2 VIEWS: CPT

## 2018-04-02 RX ORDER — METOPROLOL TARTRATE 5 MG/5ML
5 INJECTION INTRAVENOUS ONCE
Status: COMPLETED | OUTPATIENT
Start: 2018-04-02 | End: 2018-04-02

## 2018-04-02 RX ORDER — SODIUM CHLORIDE 0.9 % (FLUSH) 0.9 %
10 SYRINGE (ML) INJECTION AS NEEDED
Status: DISCONTINUED | OUTPATIENT
Start: 2018-04-02 | End: 2018-04-03

## 2018-04-02 RX ADMIN — METOPROLOL TARTRATE 5 MG: 5 INJECTION, SOLUTION INTRAVENOUS at 21:57

## 2018-04-02 RX ADMIN — IOPAMIDOL 95 ML: 755 INJECTION, SOLUTION INTRAVENOUS at 23:42

## 2018-04-02 NOTE — TELEPHONE ENCOUNTER
04/02/18  10:24 AM  Hernando Caro  1960    Home Phone [phone]     Mr. Caro saw Dr. Mast on 3/26/18 after he had an abnormal echocardiogram that day. It showed moderate left ventricular dysfunction with an estimate ejection fraction of 35% moderately dilated left ventricular cavity moderate to severe mitral regurgitation and moderate to severe tricuspid regurgitation with severe pulmonary hypertension. He was diagnosed with new onset cardiomyopathy and right and left heart caths have been scheduled for this Friday, 4/6/18.    He calls today to see if heart caths can be done sooner. He states he feels bad. He feels weak, fatigued and has BLE edema. His is SOA when trying to sleep at night. He does not measure HR/ BP. He states he has lost 6lbs over the past two weeks. He takes 20mg lasix daily and 10mg amlodipine daily.    Does he need to be seen in [facility]? Do caths need to be moved up? Does he need medication adjustment?    Indigo GILLESPIE RN

## 2018-04-02 NOTE — TELEPHONE ENCOUNTER
04/02/18  11:17 AM  Called and advised to go to ER. He states he is feeling better after shower, but he would like caths to be done tomorrow. I advised that if he feels caths need to be done urgently, he should go to ER. He will go if he starts to feel bad again - tmm.

## 2018-04-03 PROBLEM — I48.92 NEW ONSET ATRIAL FLUTTER (HCC): Status: ACTIVE | Noted: 2018-04-03

## 2018-04-03 LAB
ANION GAP SERPL CALCULATED.3IONS-SCNC: 14.1 MMOL/L
BUN BLD-MCNC: 16 MG/DL (ref 6–20)
BUN/CREAT SERPL: 12.2 (ref 7–25)
CALCIUM SPEC-SCNC: 8.9 MG/DL (ref 8.6–10.5)
CHLORIDE SERPL-SCNC: 91 MMOL/L (ref 98–107)
CO2 SERPL-SCNC: 22.9 MMOL/L (ref 22–29)
CREAT BLD-MCNC: 1.31 MG/DL (ref 0.76–1.27)
GFR SERPL CREATININE-BSD FRML MDRD: 68 ML/MIN/1.73
GLUCOSE BLD-MCNC: 89 MG/DL (ref 65–99)
HCT VFR BLDA CALC: 36 % (ref 38–51)
HCT VFR BLDA CALC: 37 % (ref 38–51)
HCT VFR BLDA CALC: 37 % (ref 38–51)
HGB BLDA-MCNC: 12.2 G/DL (ref 12–17)
HGB BLDA-MCNC: 12.6 G/DL (ref 12–17)
HGB BLDA-MCNC: 12.6 G/DL (ref 12–17)
POTASSIUM BLD-SCNC: 4.4 MMOL/L (ref 3.5–5.2)
SAO2 % BLDA: 69 % (ref 95–98)
SAO2 % BLDA: 73 % (ref 95–98)
SAO2 % BLDA: 97 % (ref 95–98)
SODIUM BLD-SCNC: 128 MMOL/L (ref 136–145)
TROPONIN T SERPL-MCNC: 0.05 NG/ML (ref 0–0.03)

## 2018-04-03 PROCEDURE — 0 IOPAMIDOL PER 1 ML: Performed by: INTERNAL MEDICINE

## 2018-04-03 PROCEDURE — 93010 ELECTROCARDIOGRAM REPORT: CPT | Performed by: INTERNAL MEDICINE

## 2018-04-03 PROCEDURE — 25010000002 FUROSEMIDE PER 20 MG: Performed by: EMERGENCY MEDICINE

## 2018-04-03 PROCEDURE — C1894 INTRO/SHEATH, NON-LASER: HCPCS | Performed by: INTERNAL MEDICINE

## 2018-04-03 PROCEDURE — 93460 R&L HRT ART/VENTRICLE ANGIO: CPT | Performed by: INTERNAL MEDICINE

## 2018-04-03 PROCEDURE — 93005 ELECTROCARDIOGRAM TRACING: CPT | Performed by: INTERNAL MEDICINE

## 2018-04-03 PROCEDURE — 25010000002 MIDAZOLAM PER 1 MG: Performed by: INTERNAL MEDICINE

## 2018-04-03 PROCEDURE — B2111ZZ FLUOROSCOPY OF MULTIPLE CORONARY ARTERIES USING LOW OSMOLAR CONTRAST: ICD-10-PCS | Performed by: INTERNAL MEDICINE

## 2018-04-03 PROCEDURE — B2151ZZ FLUOROSCOPY OF LEFT HEART USING LOW OSMOLAR CONTRAST: ICD-10-PCS | Performed by: INTERNAL MEDICINE

## 2018-04-03 PROCEDURE — 84484 ASSAY OF TROPONIN QUANT: CPT | Performed by: INTERNAL MEDICINE

## 2018-04-03 PROCEDURE — 4A023N8 MEASUREMENT OF CARDIAC SAMPLING AND PRESSURE, BILATERAL, PERCUTANEOUS APPROACH: ICD-10-PCS | Performed by: INTERNAL MEDICINE

## 2018-04-03 PROCEDURE — 99222 1ST HOSP IP/OBS MODERATE 55: CPT | Performed by: INTERNAL MEDICINE

## 2018-04-03 PROCEDURE — 85014 HEMATOCRIT: CPT

## 2018-04-03 PROCEDURE — 25010000002 FENTANYL CITRATE (PF) 100 MCG/2ML SOLUTION: Performed by: INTERNAL MEDICINE

## 2018-04-03 PROCEDURE — 80048 BASIC METABOLIC PNL TOTAL CA: CPT | Performed by: INTERNAL MEDICINE

## 2018-04-03 PROCEDURE — 85018 HEMOGLOBIN: CPT

## 2018-04-03 PROCEDURE — 25010000002 FUROSEMIDE PER 20 MG: Performed by: INTERNAL MEDICINE

## 2018-04-03 PROCEDURE — 25010000002 HEPARIN (PORCINE) PER 1000 UNITS: Performed by: INTERNAL MEDICINE

## 2018-04-03 PROCEDURE — C1769 GUIDE WIRE: HCPCS | Performed by: INTERNAL MEDICINE

## 2018-04-03 RX ORDER — GUAIFENESIN/DEXTROMETHORPHAN 100-10MG/5
10 SYRUP ORAL EVERY 4 HOURS PRN
Status: DISCONTINUED | OUTPATIENT
Start: 2018-04-03 | End: 2018-04-06 | Stop reason: HOSPADM

## 2018-04-03 RX ORDER — ACETAMINOPHEN 325 MG/1
650 TABLET ORAL EVERY 6 HOURS PRN
Status: DISCONTINUED | OUTPATIENT
Start: 2018-04-03 | End: 2018-04-05 | Stop reason: DRUGHIGH

## 2018-04-03 RX ORDER — ATORVASTATIN CALCIUM 20 MG/1
40 TABLET, FILM COATED ORAL NIGHTLY
Status: DISCONTINUED | OUTPATIENT
Start: 2018-04-03 | End: 2018-04-06 | Stop reason: HOSPADM

## 2018-04-03 RX ORDER — METOPROLOL SUCCINATE 25 MG/1
25 TABLET, EXTENDED RELEASE ORAL DAILY
Status: DISCONTINUED | OUTPATIENT
Start: 2018-04-03 | End: 2018-04-03

## 2018-04-03 RX ORDER — NALOXONE HCL 0.4 MG/ML
0.4 VIAL (ML) INJECTION
Status: DISCONTINUED | OUTPATIENT
Start: 2018-04-03 | End: 2018-04-06 | Stop reason: HOSPADM

## 2018-04-03 RX ORDER — LORAZEPAM 1 MG/1
1 TABLET ORAL NIGHTLY PRN
Status: DISCONTINUED | OUTPATIENT
Start: 2018-04-03 | End: 2018-04-06 | Stop reason: HOSPADM

## 2018-04-03 RX ORDER — HYDROCODONE BITARTRATE AND ACETAMINOPHEN 5; 325 MG/1; MG/1
1 TABLET ORAL EVERY 4 HOURS PRN
Status: DISCONTINUED | OUTPATIENT
Start: 2018-04-03 | End: 2018-04-06 | Stop reason: HOSPADM

## 2018-04-03 RX ORDER — METOPROLOL TARTRATE 5 MG/5ML
INJECTION INTRAVENOUS AS NEEDED
Status: DISCONTINUED | OUTPATIENT
Start: 2018-04-03 | End: 2018-04-03 | Stop reason: HOSPADM

## 2018-04-03 RX ORDER — ONDANSETRON 4 MG/1
4 TABLET, FILM COATED ORAL EVERY 6 HOURS PRN
Status: DISCONTINUED | OUTPATIENT
Start: 2018-04-03 | End: 2018-04-06 | Stop reason: HOSPADM

## 2018-04-03 RX ORDER — FUROSEMIDE 10 MG/ML
40 INJECTION INTRAMUSCULAR; INTRAVENOUS ONCE
Status: COMPLETED | OUTPATIENT
Start: 2018-04-03 | End: 2018-04-03

## 2018-04-03 RX ORDER — ACETAMINOPHEN 325 MG/1
650 TABLET ORAL EVERY 4 HOURS PRN
Status: DISCONTINUED | OUTPATIENT
Start: 2018-04-03 | End: 2018-04-06 | Stop reason: HOSPADM

## 2018-04-03 RX ORDER — ONDANSETRON 4 MG/1
4 TABLET, ORALLY DISINTEGRATING ORAL EVERY 6 HOURS PRN
Status: DISCONTINUED | OUTPATIENT
Start: 2018-04-03 | End: 2018-04-06 | Stop reason: HOSPADM

## 2018-04-03 RX ORDER — MIDAZOLAM HYDROCHLORIDE 1 MG/ML
INJECTION INTRAMUSCULAR; INTRAVENOUS AS NEEDED
Status: DISCONTINUED | OUTPATIENT
Start: 2018-04-03 | End: 2018-04-03 | Stop reason: HOSPADM

## 2018-04-03 RX ORDER — SODIUM CHLORIDE 9 MG/ML
INJECTION, SOLUTION INTRAVENOUS CONTINUOUS PRN
Status: DISCONTINUED | OUTPATIENT
Start: 2018-04-03 | End: 2018-04-03 | Stop reason: HOSPADM

## 2018-04-03 RX ORDER — SODIUM CHLORIDE 9 MG/ML
50 INJECTION, SOLUTION INTRAVENOUS CONTINUOUS
Status: ACTIVE | OUTPATIENT
Start: 2018-04-03 | End: 2018-04-03

## 2018-04-03 RX ORDER — ONDANSETRON 2 MG/ML
4 INJECTION INTRAMUSCULAR; INTRAVENOUS EVERY 6 HOURS PRN
Status: DISCONTINUED | OUTPATIENT
Start: 2018-04-03 | End: 2018-04-06 | Stop reason: HOSPADM

## 2018-04-03 RX ORDER — TEMAZEPAM 15 MG/1
15 CAPSULE ORAL NIGHTLY PRN
Status: DISCONTINUED | OUTPATIENT
Start: 2018-04-03 | End: 2018-04-06 | Stop reason: HOSPADM

## 2018-04-03 RX ORDER — METOPROLOL SUCCINATE 50 MG/1
50 TABLET, EXTENDED RELEASE ORAL DAILY
Status: DISCONTINUED | OUTPATIENT
Start: 2018-04-04 | End: 2018-04-05

## 2018-04-03 RX ORDER — FUROSEMIDE 40 MG/1
40 TABLET ORAL DAILY
Status: DISCONTINUED | OUTPATIENT
Start: 2018-04-04 | End: 2018-04-04

## 2018-04-03 RX ORDER — FENTANYL CITRATE 50 UG/ML
INJECTION, SOLUTION INTRAMUSCULAR; INTRAVENOUS AS NEEDED
Status: DISCONTINUED | OUTPATIENT
Start: 2018-04-03 | End: 2018-04-03 | Stop reason: HOSPADM

## 2018-04-03 RX ORDER — SODIUM CHLORIDE 0.9 % (FLUSH) 0.9 %
1-10 SYRINGE (ML) INJECTION AS NEEDED
Status: DISCONTINUED | OUTPATIENT
Start: 2018-04-03 | End: 2018-04-03

## 2018-04-03 RX ORDER — LIDOCAINE HYDROCHLORIDE 20 MG/ML
INJECTION, SOLUTION INFILTRATION; PERINEURAL AS NEEDED
Status: DISCONTINUED | OUTPATIENT
Start: 2018-04-03 | End: 2018-04-03 | Stop reason: HOSPADM

## 2018-04-03 RX ADMIN — FUROSEMIDE 40 MG: 10 INJECTION, SOLUTION INTRAMUSCULAR; INTRAVENOUS at 18:26

## 2018-04-03 RX ADMIN — FUROSEMIDE 40 MG: 10 INJECTION, SOLUTION INTRAMUSCULAR; INTRAVENOUS at 00:23

## 2018-04-03 RX ADMIN — GUAIFENESIN AND DEXTROMETHORPHAN 10 ML: 100; 10 SYRUP ORAL at 13:07

## 2018-04-03 RX ADMIN — GUAIFENESIN AND DEXTROMETHORPHAN 10 ML: 100; 10 SYRUP ORAL at 18:32

## 2018-04-03 RX ADMIN — ATORVASTATIN CALCIUM 40 MG: 20 TABLET, FILM COATED ORAL at 21:21

## 2018-04-03 RX ADMIN — SODIUM CHLORIDE 50 ML/HR: 9 INJECTION, SOLUTION INTRAVENOUS at 12:58

## 2018-04-03 RX ADMIN — HYDROCODONE BITARTRATE AND ACETAMINOPHEN 1 TABLET: 5; 325 TABLET ORAL at 13:07

## 2018-04-03 NOTE — ACP (ADVANCE CARE PLANNING)
" stopped by to inform patient about a living will. Patient wasn't very talkative and was very distracted by his phone.  left a information pamphlet and living will with him and told him to tell the nurse to page or call when or if he wants to fill it out. He said \"okay thank you for coming by\"     "

## 2018-04-03 NOTE — H&P
Patient Name: Hernando Caro  :1960  57 y.o.    Date of Admission: 2018  Date of Consultation:  18  Encounter Provider: Sara Maier MD  Place of Service: [facility] CARDIOLOGY  Referring Provider: Christine Mo MD  Patient Care Team:  Jerald Sebastian MD as PCP - General  Jerald Sebastian MD as PCP - Family Medicine      Chief complaint: SOB     History of Present Illness: Mr. Hernando Caro is a 57 year old male patient of Dr. Mast with a history of newly diagnosed cardiomyopathy with an EF of 35%, hypertension, moderate to severe mitral regurgitation, severe pulmonary hypertension, tobacco use, alcohol use who is admitted with volume overload and new onset atrial flutter.      The patient was evaluated by Dr. Mast as a new patient on on 3/26/18 following an abnormal echocardiogram.  The patient had been having issues with dyspnea on exertion so an echocardiogram was ordered by his PCP.  Prior to that visit he was started on furosemide 20 mg based on an elevated BNP with some improvement in his symptoms.  His echo  showed moderately decreased left ventricular systolic function, left ventricular wall segments are hypokinetic, moderate-to-severely dilated left ventricular cavity, grade III left ventricular diastolic dysfunction, mild-to-moderately dilated right ventricular cavity, moderate-to-severely dilated left atrial cavity size, moderate-to-severe mitral valve regurgitation and moderate tricuspid valve regurgitation with an EF of 35%. At that visit he was set up for a right and left cardiac catheterization.  No other changes were made to his management at that time.  He was originally scheduled for 18 but yesterday called the office with worsening dyspnea.  He reports that his breathing and lower extremity edema worsened over the weekend.  His dyspnea on exertion was associated with near syncope.  He also reports orthopnea for the last couple of  nights.  Yesterday he began feeling more palpitations.  He called the office and it was recommended that he come to the emergency room.  On arrival he was noted to be in atrial flutter with rates in the 140's.  Work up was negative for a pulmonary embolus but showed evidence of volume overload.  He was treated with IV metoprolol with improvement of his heart rate and a dose of IV furosemide.      The patient denies chest pain recently.  The palpitations, orthopnea, dyspnea on exertion and lower extremity edema have improved.  He was able to lay down to sleep last night (at about a 30 degree angle).        Previous Testing:     ECHO 3/14/2018      Past Medical History:   Diagnosis Date   • Cardiomyopathy    • Ejection fraction < 50%    • Essential hypertension    • Mitral regurgitation        Past Surgical History:   Procedure Laterality Date   • BACK SURGERY           Prior to Admission medications    Medication Sig Start Date End Date Taking? Authorizing Provider   amLODIPine (NORVASC) 10 MG tablet Take 1 tablet by mouth Daily. 3/7/18   JANET Soriano   buPROPion SR (WELLBUTRIN SR) 150 MG 12 hr tablet 1 PO AM x 3 days, then 1 PO BID 3/7/18   JANET Soriano   furosemide (LASIX) 20 MG tablet Take 1 tablet by mouth Daily. 3/14/18   JANET Soriano   potassium chloride (K-DUR,KLOR-CON) 10 MEQ CR tablet Take 1 tablet by mouth Daily. 3/14/18   JANET Soriano   tadalafil (CIALIS) 20 MG tablet Take 1 tablet by mouth Daily As Needed for erectile dysfunction. 3/7/18   JANET Soriano       Allergies   Allergen Reactions   • Ace Inhibitors    • Lisinopril Angioedema       Social History     Social History   • Marital status: Single     Social History Main Topics   • Smoking status: Current Every Day Smoker     Packs/day: 0.25     Years: 15.00     Types: Cigarettes   • Smokeless tobacco: Never Used      Comment: caffeine 1 cup day   • Alcohol use Yes      Comment:  3 beers nightly   • Drug use: No   • Sexual activity: Yes     Other Topics Concern   • Not on file       Family History   Problem Relation Age of Onset   • Congenital heart disease Brother    • Hypertension Mother    • Thyroid disease Mother    • Lung disease Father    • No Known Problems Maternal Grandmother    • No Known Problems Maternal Grandfather    • No Known Problems Paternal Grandmother    • No Known Problems Paternal Grandfather    • Allergic rhinitis Sister    • Hypertension Brother    • No Known Problems Brother    • No Known Problems Brother        REVIEW OF SYSTEMS:   All systems reviewed.  Pertinent positives identified in HPI.  All other systems are negative.      Objective:     Vitals:    04/03/18 0055 04/03/18 0114 04/03/18 0121 04/03/18 0239   BP:       BP Location:       Patient Position:       Pulse: 87 112 95    Resp:       SpO2: 98% 100% 98%    Weight:    96.2 kg (212 lb)   Height:         Body mass index is 25.14 kg/m².    General Appearance:    Alert, cooperative, in no acute distress   Head:    Normocephalic, without obvious abnormality, atraumatic   Eyes:            Lids and lashes normal, conjunctivae and sclerae normal, no   icterus, no pallor, corneas clear, PERRLA   Ears:    Ears appear intact with no abnormalities noted   Neck:   No adenopathy, supple, trachea midline, no thyromegaly, no   carotid bruit, no JVD   Lungs:     Clear to auscultation,respirations regular, even and unlabored    Heart:    Irregularly, irregular, normal S1 and S2, no murmur, no gallop, no rub, no click   Chest Wall:    No abnormalities observed   Abdomen:     Normal bowel sounds, no masses, no organomegaly, soft        non-tender, non-distended, no guarding, no rebound  tenderness   Extremities:   1+ bilateral ankle and pedal edema   Pulses:   Pulses palpable and equal bilaterally. Normal radial, carotid, femoral, dorsalis pedis and posterior tibial pulses bilaterally. Normal abdominal aorta    Skin:  Psychiatric:   No bleeding, bruising or rash    Alert and oriented x 3, normal mood and affect   Lab Review:       Results from last 7 days  Lab Units 04/03/18  0551 04/02/18  2136   SODIUM mmol/L 128* 127*   POTASSIUM mmol/L 4.4 4.2   CHLORIDE mmol/L 91* 89*   CO2 mmol/L 22.9 24.3   BUN mg/dL 16 14   CREATININE mg/dL 1.31* 1.37*   CALCIUM mg/dL 8.9 9.0   BILIRUBIN mg/dL  --  0.8   ALK PHOS U/L  --  70   ALT (SGPT) U/L  --  32   AST (SGOT) U/L  --  33   GLUCOSE mg/dL 89 83       Results from last 7 days  Lab Units 04/03/18  0551 04/02/18  2136   TROPONIN T ng/mL 0.051* 0.057*       Results from last 7 days  Lab Units 04/02/18  2136   WBC 10*3/mm3 6.49   HEMOGLOBIN g/dL 12.6*   HEMATOCRIT % 37.2*   PLATELETS 10*3/mm3 229       TELE 4/3/2018 6:42 a.m.     EKG 4/2/2018      EKG 4/2/2018                  I personally viewed and interpreted the patient's EKG.        Assessment and Plan:       1. Acute/chronic systolic CHF.  Improved this morning but will need more diuresis following cath today.   2. Atrial flutter.  New diagnosis.  Rate improved with a dose of IV metoprolol overnight.  Suspect this was the cause of his worsening volume overload.  Unclear if this is the cause or the result of his cardiomyopathy.  CHADS-VASc 2 currently.  Will need to discuss anticoagulation following cardiac catheterization.  With his mitral regurgitation warfarin will probably be best option.   3. Cardiomyopathy. EF 35%.  For right and left heart catheterization today.    4. Moderate to severe mitral regurgitation. Not sure if this is a result of or cause of cardiomyopathy.    5. Severe pulmonary hypertension  6. Hypertension  7. Tobacco use  8.  Etoh use    -  Await results of cardiac catheterization today  -  Further diuresis following cath  -  If has multivessel CAD, may need CABG with mitral valve repair.  If no CAD will plan for medical management with reassessment of mitral regurgitation and LV function in the near  future.  -  Will start metoprolol succinate today for cardiomyopathy and rate control  -  Will discuss anticoagulation for atrial flutter following cath.        Sara Maier MD  04/03/18  7:23 AM

## 2018-04-03 NOTE — DISCHARGE INSTRUCTIONS
[facility]  [address] [place] [address]    Coronary Angiogram (Radial/Ulnar Approach) After Care    Refer to this sheet in the next few weeks. These instructions provide you with information on caring for yourself after your procedure. Your caregiver may also give you more specific instructions. Your treatment has been planned according to current medical practices, but problems sometimes occur. Call your caregiver if you have any problems or questions after your procedure.    Home Care Instructions:  · You may shower the day after the procedure. Remove the bandage (dressing) and gently wash the site with plain soap and water. Gently pat the site dry. You may apply a band aid daily for 2 days if desired.    · Do not apply powder or lotion to the site.  · Do not submerge the affected site in water for 3 to 5 days or until the site is completely healed.   · Do not lift, push or pull anything over 10 pounds for 2 days after your procedure.  · Inspect the site at least twice daily. You may notice some bruising at the site and it may be tender for 1 to 2 weeks.     · Increase your fluid intake for the next 2 days.    · Keep arm elevated for 24 hours. For the remainder of the day, keep your arm in “Pledge of Allegiance” position when up and about.     · You may drive 24 hours after the procedure unless otherwise instructed by your caregiver.  · Do not operate machinery or power tools for 24 hours.  · A responsible adult should be with you for the first 24 hours after you arrive home. Do not make any important legal decisions or sign legal papers for 24 hours.  Do not drink alcohol for 24 hours.    · Metformin or any medications containing Metformin should not be taken for 48 hours after your procedure.      Call Your Doctor if:   · You have unusual pain at the radial/ulnar (wrist) site.  · You have redness, warmth, swelling, or pain at the radial/ulnar (wrist) site.  · You have drainage (other  than a small amount of blood on the dressing).  · You have chills or a fever > 101.  · Your arm becomes pale or dark, cool, tingly, or numb.  · You have heavy bleeding from the site, hold pressure on the site for 20 minutes.  If the bleeding stops, apply a fresh bandage and call your cardiologist.  However, if you continue to have bleeding, call 911.

## 2018-04-03 NOTE — PLAN OF CARE
Problem: Patient Care Overview  Goal: Plan of Care Review   04/03/18 0251   Coping/Psychosocial   Plan of Care Reviewed With patient;significant other   Plan of Care Review   Progress no change   OTHER   Outcome Summary Short of air with any activity. Heart monitor shows Aflutter. Denies pain or nausea. VSS.     Goal: Individualization and Mutuality  Outcome: Ongoing (interventions implemented as appropriate)    Goal: Discharge Needs Assessment  Outcome: Ongoing (interventions implemented as appropriate)      Problem: Cardiac Output Decreased (Adult)  Goal: Identify Related Risk Factors and Signs and Symptoms  Outcome: Outcome(s) achieved Date Met: 04/03/18    Goal: Effective Tissue Perfusion  Outcome: Ongoing (interventions implemented as appropriate)      Problem: Fall Risk (Adult)  Goal: Identify Related Risk Factors and Signs and Symptoms  Outcome: Outcome(s) achieved Date Met: 04/03/18    Goal: Absence of Fall  Outcome: Ongoing (interventions implemented as appropriate)

## 2018-04-03 NOTE — ED PROVIDER NOTES
EMERGENCY DEPARTMENT ENCOUNTER    CHIEF COMPLAINT  Chief Complaint: SOA  History given by: patient  History limited by: nothing  Room Number: 37/37  PMD: Jerald Sebastian MD  Cardiology- Dr. Mast    HPI:  Pt is a 57 y.o. male who presents complaining of waxing and waning SOA over the last two months, which has become worse over the last 2 days. Pt states that his SOA is worse with lying supine, talking and exertion and improves with rest and sitting upright. Pt also complains of intermittent rapid palpitations over the last 2 months but denies having any fever, CP, cough or recent illness. Pt states that he drives several hundred miles every day for work but states that he does take breaks while driving and denies additional immobilization or travel. Pt denies a history of cardiac disease but states that his brother had similar symptoms previously.     Duration:  2 months  Onset: gradual  Timing: waxing and waning  Quality: SOA  Intensity/Severity: moderate to severe  Progression: worsening  Associated Symptoms: palpitations  Aggravating Factors: exertion, lying supine, talking  Alleviating Factors: rest, sitting upright  Previous Episodes: none  Treatment before arrival: none    PAST MEDICAL HISTORY  Active Ambulatory Problems     Diagnosis Date Noted   • Essential hypertension    • ED (erectile dysfunction) 03/07/2018   • Moderate mitral regurgitation 03/26/2018   • Moderate tricuspid regurgitation 03/26/2018   • Combined systolic and diastolic congestive heart failure 03/26/2018   • Cardiomyopathy 03/30/2018     Resolved Ambulatory Problems     Diagnosis Date Noted   • No Resolved Ambulatory Problems     Past Medical History:   Diagnosis Date   • Cardiomyopathy    • Ejection fraction < 50%    • Essential hypertension    • Mitral regurgitation        PAST SURGICAL HISTORY  Past Surgical History:   Procedure Laterality Date   • BACK SURGERY         FAMILY HISTORY  Family History   Problem Relation Age of Onset    • Congenital heart disease Brother    • Hypertension Mother    • Thyroid disease Mother    • Lung disease Father    • No Known Problems Maternal Grandmother    • No Known Problems Maternal Grandfather    • No Known Problems Paternal Grandmother    • No Known Problems Paternal Grandfather    • Allergic rhinitis Sister    • Hypertension Brother    • No Known Problems Brother    • No Known Problems Brother        SOCIAL HISTORY  Social History     Social History   • Marital status: Single     Spouse name: N/A   • Number of children: N/A   • Years of education: N/A     Occupational History   • Not on file.     Social History Main Topics   • Smoking status: Current Every Day Smoker   • Smokeless tobacco: Never Used      Comment: caffeine 1 cup day   • Alcohol use Yes      Comment: 3 beers nightly   • Drug use: No   • Sexual activity: Not on file     Other Topics Concern   • Not on file     Social History Narrative   • No narrative on file       ALLERGIES  Ace inhibitors and Lisinopril    REVIEW OF SYSTEMS  Review of Systems   Constitutional: Negative for activity change, appetite change and fever.   HENT: Negative for congestion and sore throat.    Eyes: Negative.    Respiratory: Positive for shortness of breath. Negative for cough.    Cardiovascular: Positive for palpitations (rapid). Negative for chest pain and leg swelling.   Gastrointestinal: Negative for abdominal pain, diarrhea and vomiting.   Endocrine: Negative.    Genitourinary: Negative for decreased urine volume and dysuria.   Musculoskeletal: Negative for neck pain.   Skin: Negative for rash and wound.   Allergic/Immunologic: Negative.    Neurological: Negative for weakness, numbness and headaches.   Hematological: Negative.    Psychiatric/Behavioral: Negative.    All other systems reviewed and are negative.      PHYSICAL EXAM  ED Triage Vitals   Temp Heart Rate Resp BP SpO2   -- 04/02/18 2051 04/02/18 2051 04/02/18 2104 04/02/18 2051    (!) 140 18 (!)  138/108 96 %      Temp src Heart Rate Source Patient Position BP Location FiO2 (%)   -- 04/02/18 2051 -- -- --    Monitor          Physical Exam   Constitutional: He is oriented to person, place, and time. No distress.   HENT:   Head: Normocephalic and atraumatic.   Eyes: EOM are normal. Pupils are equal, round, and reactive to light.   Neck: Normal range of motion. Neck supple.   Cardiovascular: Normal heart sounds.  An irregular rhythm present. Tachycardia present.    LG=[id number]'s   Pulmonary/Chest: Effort normal and breath sounds normal. No respiratory distress.   Abdominal: Soft. There is no tenderness. There is no rebound and no guarding.   Musculoskeletal: Normal range of motion. He exhibits no edema.   No calf tenderness   Neurological: He is alert and oriented to person, place, and time. He has normal sensation and normal strength.   Skin: Skin is warm and dry.   Psychiatric: Mood and affect normal.   Nursing note and vitals reviewed.      LAB RESULTS  Lab Results (last 24 hours)     Procedure Component Value Units Date/Time    CBC & Differential [[id number]] Collected:  04/02/18 2136    Specimen:  Blood Updated:  04/02/18 2149    Narrative:       The following orders were created for panel order CBC & Differential.  Procedure                               Abnormality         Status                     ---------                               -----------         ------                     CBC Auto Differential[[id number]]        Abnormal            Final result                 Please view results for these tests on the individual orders.    Comprehensive Metabolic Panel [[id number]]  (Abnormal) Collected:  04/02/18 2136    Specimen:  Blood Updated:  04/02/18 2212     Glucose 83 mg/dL      BUN 14 mg/dL      Creatinine 1.37 (H) mg/dL      Sodium 127 (L) mmol/L      Potassium 4.2 mmol/L      Chloride 89 (L) mmol/L      CO2 24.3 mmol/L      Calcium 9.0 mg/dL      Total Protein 6.4 g/dL      Albumin 3.70 g/dL       ALT (SGPT) 32 U/L      AST (SGOT) 33 U/L      Alkaline Phosphatase 70 U/L      Total Bilirubin 0.8 mg/dL      eGFR  African Amer 65 mL/min/1.73      Globulin 2.7 gm/dL      A/G Ratio 1.4 g/dL      BUN/Creatinine Ratio 10.2     Anion Gap 13.7 mmol/L     BNP [[id number]]  (Abnormal) Collected:  04/02/18 2136    Specimen:  Blood Updated:  04/02/18 2211     proBNP 3,988.0 (H) pg/mL     Narrative:       Among patients with dyspnea, NT-proBNP is highly sensitive for the detection of acute congestive heart failure. In addition NT-proBNP of <300 pg/ml effectively rules out acute congestive heart failure with 99% negative predictive value.    Troponin [[id number]]  (Abnormal) Collected:  04/02/18 2136    Specimen:  Blood Updated:  04/02/18 2214     Troponin T 0.057 (H) ng/mL     Narrative:       Troponin T Reference Ranges:  Less than 0.03 ng/mL:    Negative for AMI  0.03 to 0.09 ng/mL:      Indeterminant for AMI  Greater than 0.09 ng/mL: Positive for AMI    CBC Auto Differential [[id number]]  (Abnormal) Collected:  04/02/18 2136    Specimen:  Blood Updated:  04/02/18 2149     WBC 6.49 10*3/mm3      RBC 4.05 (L) 10*6/mm3      Hemoglobin 12.6 (L) g/dL      Hematocrit 37.2 (L) %      MCV 91.9 fL      MCH 31.1 pg      MCHC 33.9 g/dL      RDW 13.1 %      RDW-SD 43.5 fl      MPV 10.2 fL      Platelets 229 10*3/mm3      Neutrophil % 53.3 %      Lymphocyte % 33.4 %      Monocyte % 11.1 %      Eosinophil % 2.0 %      Basophil % 0.2 %      Immature Grans % 0.0 %      Neutrophils, Absolute 3.46 10*3/mm3      Lymphocytes, Absolute 2.17 10*3/mm3      Monocytes, Absolute 0.72 10*3/mm3      Eosinophils, Absolute 0.13 10*3/mm3      Basophils, Absolute 0.01 10*3/mm3      Immature Grans, Absolute 0.00 10*3/mm3     D-dimer, Quantitative [[id number]]  (Abnormal) Collected:  04/02/18 2136    Specimen:  Blood Updated:  04/02/18 2238     D-Dimer, Quantitative 3.40 (H) MCGFEU/mL     Narrative:       The Stago D-Dimer test used in conjunction with a  clinical pretest probability (PTP) assessment model, has been approved by the FDA to rule out the presence of venous thromboembolism (VTE) in outpatients suspected of deep venous thrombosis (DVT) or pulmonary embolism (PE).           I ordered the above labs and reviewed the results    RADIOLOGY  CT Angiogram Chest With Contrast   Final Result   1. Small-moderate right pleural effusion with probable right lower lobe   atelectasis.   2. There is no central/saddle pulmonary embolus, the branch vessels   cannot be assessed.   3. Cardiomegaly with evidence of elevated right heart pressures       This report was finalized on 4/2/2018 11:55 PM by Shane Mckeon MD.          XR Chest 2 View   Final Result   Mild chronic-appearing changes in the lung apices with small   right pleural effusion       This report was finalized on 4/2/2018 9:59 PM by Shane Mckeon MD.               I ordered the above noted radiological studies. Interpreted by radiologist. . Reviewed by me in PACS.       PROCEDURES  Procedures  EKG           EKG time: 2057  Rhythm/Rate: narrow complex tachycardia (question a-flutter vs. sinus tachycardia), 141  QRS, axis: LVH   ST and T waves: repolarization changes     Interpreted Contemporaneously by me, independently viewed  No prior for comparison    EKG           EKG time: 2254  Rhythm/Rate: ventricular trigeminy, 75  QRS, axis: LVH   ST and T waves: repolarization changes     Interpreted Contemporaneously by me, independently viewed  changed compared to prior (rate has slowed and trigeminy is new)    PROGRESS AND CONSULTS  ED Course     2121- Discussed the plan to order lab and imaging studies for further evaluation and medication for rate control. Pt understands and agrees with the plan, all questions answered.    2126- Ordered lopressor for rate control and D-Dimer for further evaluation.    2235- Pt appears to be in a-flutter on the monitor. Ordered repeat EKG for further evaluation.    2243-  Rechecked pt. Pt continues to appear dyspneic and is sitting on the edge of the bed. On re-exam, the pt's heart continues to be tachycardic and appears to be in a-flutter. Notified pt that I am waiting on his D-Dimer at this time. Pt understands and agrees with the plan, all questions answered.      2244- Ordered CTA Chest for further evaluation.    2245- Rechecked pt. Pt's FD=697. Notified pt of his elevated D-Dimer and the plan to order a CTA Chest prior to admitting the pt for further evaluation. Pt understands and agrees with the plan, all questions answered.    [place]- [place] [facility] [place]    0007- Discussed the pt's case with Dr. Mo (cardiology), who agrees to admit the pt to a telemetry bed. She requests that I order Lasix, which I will do.    0009- Ordered Lasix for diuresis.    0022- Rechecked pt. Pt is resting comfortably. Notified pt of his CTA Chest results. Discussed the plan to admit the pt for diuresis and further evaluation. Pt and family agree with the plan and all questions were addressed.    MEDICAL DECISION MAKING  Results were reviewed/discussed with the patient and they were also made aware of online access. Pt also made aware that some labs, such as cultures, will not be resulted during ER visit and follow up with PMD is necessary.     MDM  Number of Diagnoses or Management Options  Dilated cardiomyopathy:   Dyspnea, unspecified type:   New onset atrial flutter:   Pleural effusion, right:      Amount and/or Complexity of Data Reviewed  Clinical lab tests: ordered and reviewed (TCK=8588, D-Dimer=3.40)  Tests in the radiology section of CPT®: ordered and reviewed (CXR shows mild chronic-appearing changes in the lung apices with a small, right pleural effusion.)  Tests in the medicine section of CPT®: ordered and reviewed (See EKG procedure note)  Decide to obtain previous medical records or to obtain history from someone other than the patient: yes  Review and summarize past medical  records: yes (Pt had an ECHO on 3/26/18 that showed diffuse hypokinesis, moderate-to-severe mitral valve regurgitation and moderate tricuspid valve regurgitation. Pt is scheduled for a cardiac catheterization tomorrow.)  Discuss the patient with other providers: yes (D/w Dr. Mo (cardiology))  Independent visualization of images, tracings, or specimens: yes    Patient Progress  Patient progress: stable         DIAGNOSIS  Final diagnoses:   New onset atrial flutter   Dyspnea, unspecified type   Pleural effusion, right   Dilated cardiomyopathy       DISPOSITION  ADMISSION    Discussed treatment plan and reason for admission with pt/family and admitting physician.  Pt/family voiced understanding of the plan for admission for further testing/treatment as needed.       Latest Documented Vital Signs:  As of 12:28 AM  BP- 98/44 HR- 109 Temp-   O2 sat- 95%    --  Documentation assistance provided by darrel Carlos for Dr. Daniels.  Information recorded by the scribe was done at my direction and has been verified and validated by me.     [place]  04/03/18 0029       Lg Daniels MD  04/03/18 0318

## 2018-04-04 LAB
ANION GAP SERPL CALCULATED.3IONS-SCNC: 12.1 MMOL/L
BUN BLD-MCNC: 20 MG/DL (ref 6–20)
BUN/CREAT SERPL: 14.2 (ref 7–25)
CALCIUM SPEC-SCNC: 8.7 MG/DL (ref 8.6–10.5)
CHLORIDE SERPL-SCNC: 95 MMOL/L (ref 98–107)
CO2 SERPL-SCNC: 23.9 MMOL/L (ref 22–29)
CREAT BLD-MCNC: 1.41 MG/DL (ref 0.76–1.27)
DEPRECATED RDW RBC AUTO: 44.2 FL (ref 37–54)
ERYTHROCYTE [DISTWIDTH] IN BLOOD BY AUTOMATED COUNT: 13.3 % (ref 11.5–14.5)
GFR SERPL CREATININE-BSD FRML MDRD: 63 ML/MIN/1.73
GLUCOSE BLD-MCNC: 121 MG/DL (ref 65–99)
HCT VFR BLD AUTO: 36.1 % (ref 40.4–52.2)
HGB BLD-MCNC: 11.9 G/DL (ref 13.7–17.6)
MCH RBC QN AUTO: 30.4 PG (ref 27–32.7)
MCHC RBC AUTO-ENTMCNC: 33 G/DL (ref 32.6–36.4)
MCV RBC AUTO: 92.1 FL (ref 79.8–96.2)
PLATELET # BLD AUTO: 232 10*3/MM3 (ref 140–500)
PMV BLD AUTO: 10.9 FL (ref 6–12)
POTASSIUM BLD-SCNC: 3.9 MMOL/L (ref 3.5–5.2)
RBC # BLD AUTO: 3.92 10*6/MM3 (ref 4.6–6)
SODIUM BLD-SCNC: 131 MMOL/L (ref 136–145)
WBC NRBC COR # BLD: 6.3 10*3/MM3 (ref 4.5–10.7)

## 2018-04-04 PROCEDURE — 80048 BASIC METABOLIC PNL TOTAL CA: CPT | Performed by: INTERNAL MEDICINE

## 2018-04-04 PROCEDURE — 99232 SBSQ HOSP IP/OBS MODERATE 35: CPT | Performed by: INTERNAL MEDICINE

## 2018-04-04 PROCEDURE — 85027 COMPLETE CBC AUTOMATED: CPT | Performed by: INTERNAL MEDICINE

## 2018-04-04 RX ORDER — DIGOXIN 125 MCG
125 TABLET ORAL
Status: DISCONTINUED | OUTPATIENT
Start: 2018-04-04 | End: 2018-04-06 | Stop reason: HOSPADM

## 2018-04-04 RX ORDER — FUROSEMIDE 40 MG/1
40 TABLET ORAL
Status: DISCONTINUED | OUTPATIENT
Start: 2018-04-04 | End: 2018-04-05

## 2018-04-04 RX ORDER — WARFARIN SODIUM 5 MG/1
5 TABLET ORAL
Status: COMPLETED | OUTPATIENT
Start: 2018-04-04 | End: 2018-04-04

## 2018-04-04 RX ADMIN — GUAIFENESIN AND DEXTROMETHORPHAN 10 ML: 100; 10 SYRUP ORAL at 00:03

## 2018-04-04 RX ADMIN — FUROSEMIDE 40 MG: 40 TABLET ORAL at 08:41

## 2018-04-04 RX ADMIN — WARFARIN SODIUM 5 MG: 5 TABLET ORAL at 17:18

## 2018-04-04 RX ADMIN — ATORVASTATIN CALCIUM 40 MG: 20 TABLET, FILM COATED ORAL at 20:45

## 2018-04-04 RX ADMIN — METOPROLOL SUCCINATE 50 MG: 50 TABLET, FILM COATED, EXTENDED RELEASE ORAL at 11:09

## 2018-04-04 RX ADMIN — FUROSEMIDE 40 MG: 40 TABLET ORAL at 17:19

## 2018-04-04 RX ADMIN — DIGOXIN 125 MCG: 0.12 TABLET ORAL at 11:08

## 2018-04-04 NOTE — PROGRESS NOTES
Discharge Planning Assessment  [facility]     Patient Name: Hernando Caro  MRN: [id number]  Today's Date: 4/4/2018    Admit Date: 4/2/2018          Discharge Needs Assessment     Row Name 04/04/18 1216       Living Environment    Lives With significant other    Name(s) of Who Lives With Patient Mara Keller     Current Living Arrangements home/apartment/condo    Primary Care Provided by self    Family Caregiver if Needed significant other    Able to Return to Prior Arrangements yes       Resource/Environmental Concerns    Transportation Concerns car, none       Transition Planning    Transportation Anticipated family or friend will provide       Discharge Needs Assessment    Readmission Within the Last 30 Days no previous admission in last 30 days    Concerns to be Addressed no discharge needs identified;denies needs/concerns at this time    Equipment Currently Used at Home none    Offered/Gave Vendor List yes            Discharge Plan     Row Name 04/04/18 1202       Plan    Plan Home     Patient/Family in Agreement with Plan yes    Plan Comments Spoke with pt at bedside. Facesheet info verified. Role of CCP explained. Pharmacy verified. Pt lives with his S/O Mara in a multilevel home with 6 steps to enter. Pts bedroom bathroom are on the first floor. Pt denies any history of HH or [facility]. Pt voiced the concern, he may need home oxygen. Pt would like to used [place] if needed. Pt plans to return home with his S/O and denies any discharge planning needs at this time. Pt anticipates dc friday. CCP to follow.        Destination     No service coordination in this encounter.      Durable Medical Equipment     No service coordination in this encounter.      Dialysis/Infusion     No service coordination in this encounter.      Home Medical Care     No service coordination in this encounter.      Social Care     No service coordination in this encounter.        Expected Discharge Date and Time     Expected Discharge  Date Expected Discharge Time    Apr 6, 2018               Demographic Summary    No documentation.           Functional Status    No documentation.           Psychosocial    No documentation.           Abuse/Neglect    No documentation.           Legal    No documentation.           Substance Abuse    No documentation.           Patient Forms    No documentation.         Christine Roberts RN

## 2018-04-04 NOTE — PLAN OF CARE
Problem: Patient Care Overview  Goal: Plan of Care Review  Outcome: Ongoing (interventions implemented as appropriate)   04/04/18 1644   Coping/Psychosocial   Plan of Care Reviewed With patient   Plan of Care Review   Progress improving   OTHER   Outcome Summary Patient continues with Afib on tele, and still gets SOA with exertion, but states he is feeling better this afternoon. Pt started on Digoxin and continued beta blocker. Pt will start coumadin and additional dose of Lasix this afternoon. Pt still requires 02 at times at 2 lpm n/c. Will continue to monitor heart rate and rhythm, No s/s of distress and no c/o pain.     Goal: Individualization and Mutuality  Outcome: Ongoing (interventions implemented as appropriate)    Goal: Discharge Needs Assessment  Outcome: Ongoing (interventions implemented as appropriate)   04/03/18 0234 04/04/18 1216 04/04/18 1644   Discharge Needs Assessment   Readmission Within the Last 30 Days --  no previous admission in last 30 days --    Concerns to be Addressed --  no discharge needs identified;denies needs/concerns at this time --    Patient/Family Anticipates Transition to home --  --    Patient/Family Anticipated Services at Transition none --  --    Transportation Concerns --  car, none --    Transportation Anticipated --  family or friend will provide --    Current Discharge Risk --  --  physical impairment   Disability   Equipment Currently Used at Home --  none --        Problem: Cardiac Output Decreased (Adult)  Goal: Effective Tissue Perfusion  Outcome: Ongoing (interventions implemented as appropriate)   04/04/18 1644   Cardiac Output Decreased (Adult)   Effective Tissue Perfusion making progress toward outcome       Problem: Fall Risk (Adult)  Goal: Absence of Fall  Outcome: Ongoing (interventions implemented as appropriate)   04/04/18 1644   Fall Risk (Adult)   Absence of Fall making progress toward outcome

## 2018-04-04 NOTE — PLAN OF CARE
Problem: Patient Care Overview  Goal: Plan of Care Review  Outcome: Ongoing (interventions implemented as appropriate)   04/04/18 0610   Coping/Psychosocial   Plan of Care Reviewed With patient   OTHER   Outcome Summary Rested quietly tonight cardiac cath site right brachial and right radial dressings dry/intact good pulses palpable ,patient had O2 on at 2lpm all night gets very SOA with any exertion.VSS no distress noted

## 2018-04-04 NOTE — PROGRESS NOTES
Hospital Follow Up        Chief Complaint: Follow up CHF    Interval History: Breathing better.      Objective:     Objective:  Temp:  [97.4 °F (36.3 °C)-99.6 °F (37.6 °C)] 98.4 °F (36.9 °C)  Heart Rate:  [] 122  Resp:  [16-18] 18  BP: ()/() 125/89     Intake/Output Summary (Last 24 hours) at 04/04/18 0808  Last data filed at 04/03/18 2330   Gross per 24 hour   Intake             2252 ml   Output             2400 ml   Net             -148 ml     Body mass index is 25.22 kg/m².  1    04/02/18  2103 04/03/18  0239 04/04/18  0605   Weight: 93.4 kg (206 lb) 96.2 kg (212 lb) 96.5 kg (212 lb 11.2 oz)     Weight change: 3.039 kg (6 lb 11.2 oz)      Physical Exam:   General : Alert, cooperative, in no acute distress.  Neuro: alert,cooperative and oriented  Lungs: CTAB. Normal respiratory effort and rate.  CV:: Regular rate and rhythm, normal S1 and S2, no murmurs, gallops or rubs.  ABD: Soft, nontender, non-distended. positive bowel sounds  Extr: No bilateral lower extremity edema    Lab Review:     Results from last 7 days  Lab Units 04/04/18  0542 04/03/18  0551 04/02/18  2136   SODIUM mmol/L 131* 128* 127*   POTASSIUM mmol/L 3.9 4.4 4.2   CHLORIDE mmol/L 95* 91* 89*   CO2 mmol/L 23.9 22.9 24.3   BUN mg/dL 20 16 14   CREATININE mg/dL 1.41* 1.31* 1.37*   GLUCOSE mg/dL 121* 89 83   CALCIUM mg/dL 8.7 8.9 9.0   AST (SGOT) U/L  --   --  33   ALT (SGPT) U/L  --   --  32       Results from last 7 days  Lab Units 04/03/18  0551 04/02/18  2136   TROPONIN T ng/mL 0.051* 0.057*       Results from last 7 days  Lab Units 04/04/18  0542 04/03/18  1053  04/02/18  2136   WBC 10*3/mm3 6.30  --   --  6.49   HEMOGLOBIN g/dL 11.9*  --   --  12.6*   HEMOGLOBIN, POC g/dL  --  12.6  < >  --    HEMATOCRIT % 36.1*  --   --  37.2*   HEMATOCRIT POC %  --  37*  < >  --    PLATELETS 10*3/mm3 232  --   --  229   < > = values in this interval not displayed.                Invalid input(s): LDLCALC    Results from last 7 days  Lab  Units 04/02/18  2136   PROBNP pg/mL 3,988.0*         I reviewed the patient's new clinical results.  I personally viewed and interpreted the patient's EKG  Current Medications:   Scheduled Meds:  atorvastatin 40 mg Oral Nightly   furosemide 40 mg Oral Daily   metoprolol succinate XL 50 mg Oral Daily     Continuous Infusions:     Allergies:  Allergies   Allergen Reactions   • Ace Inhibitors    • Lisinopril Angioedema       Assessment/Plan:     1. Acute/chronic systolic CHF.  Improving.     2. Atrial flutter.  New diagnosis.  Fair rate control but would like rates a little lower with his cardiomyopathy.  CHADS-VASc 2 currently.  With his mitral regurgitation warfarin will probably be best option.   3. Non-ischemic cardiomyopathy. EF 35%.  For right and left heart catheterization today.    4. Moderate to severe mitral regurgitation. Not sure if this is a result of or cause of cardiomyopathy.    5. Severe pulmonary hypertension  6. Hypertension  7. Tobacco use  8.  Etoh use  9.  Hyponatremia.  Improving with diuresis.      -  Switch to oral furosemide today.   -  Increase metoprolol dose today  -  Add digoxin 0.125 mg  -  Discussed anticoagulation with patient and family.  I recommended starting warfarin which he is agreeable with.  Will give first dose tonight.          Sara Maier MD  04/04/18  8:08 AM

## 2018-04-05 LAB
ANION GAP SERPL CALCULATED.3IONS-SCNC: 13 MMOL/L
BUN BLD-MCNC: 18 MG/DL (ref 6–20)
BUN/CREAT SERPL: 13.3 (ref 7–25)
CALCIUM SPEC-SCNC: 9.1 MG/DL (ref 8.6–10.5)
CHLORIDE SERPL-SCNC: 93 MMOL/L (ref 98–107)
CO2 SERPL-SCNC: 25 MMOL/L (ref 22–29)
CREAT BLD-MCNC: 1.35 MG/DL (ref 0.76–1.27)
GFR SERPL CREATININE-BSD FRML MDRD: 66 ML/MIN/1.73
GLUCOSE BLD-MCNC: 111 MG/DL (ref 65–99)
INR PPP: 1.15 (ref 0.9–1.1)
POTASSIUM BLD-SCNC: 3.9 MMOL/L (ref 3.5–5.2)
PROTHROMBIN TIME: 14.5 SECONDS (ref 11.7–14.2)
SODIUM BLD-SCNC: 131 MMOL/L (ref 136–145)

## 2018-04-05 PROCEDURE — 94799 UNLISTED PULMONARY SVC/PX: CPT

## 2018-04-05 PROCEDURE — 25010000002 DIGOXIN PER 500 MCG: Performed by: INTERNAL MEDICINE

## 2018-04-05 PROCEDURE — 80048 BASIC METABOLIC PNL TOTAL CA: CPT | Performed by: INTERNAL MEDICINE

## 2018-04-05 PROCEDURE — 99232 SBSQ HOSP IP/OBS MODERATE 35: CPT | Performed by: INTERNAL MEDICINE

## 2018-04-05 PROCEDURE — 85610 PROTHROMBIN TIME: CPT | Performed by: INTERNAL MEDICINE

## 2018-04-05 PROCEDURE — 94762 N-INVAS EAR/PLS OXIMTRY CONT: CPT

## 2018-04-05 PROCEDURE — 25010000002 FUROSEMIDE PER 20 MG: Performed by: INTERNAL MEDICINE

## 2018-04-05 RX ORDER — FUROSEMIDE 10 MG/ML
40 INJECTION INTRAMUSCULAR; INTRAVENOUS EVERY 8 HOURS
Status: COMPLETED | OUTPATIENT
Start: 2018-04-05 | End: 2018-04-06

## 2018-04-05 RX ORDER — WARFARIN SODIUM 5 MG/1
5 TABLET ORAL
Status: COMPLETED | OUTPATIENT
Start: 2018-04-05 | End: 2018-04-05

## 2018-04-05 RX ORDER — DIGOXIN 0.25 MG/ML
500 INJECTION INTRAMUSCULAR; INTRAVENOUS ONCE
Status: COMPLETED | OUTPATIENT
Start: 2018-04-05 | End: 2018-04-05

## 2018-04-05 RX ORDER — METOPROLOL SUCCINATE 50 MG/1
50 TABLET, EXTENDED RELEASE ORAL EVERY 12 HOURS SCHEDULED
Status: DISCONTINUED | OUTPATIENT
Start: 2018-04-05 | End: 2018-04-06 | Stop reason: HOSPADM

## 2018-04-05 RX ADMIN — ATORVASTATIN CALCIUM 40 MG: 20 TABLET, FILM COATED ORAL at 21:26

## 2018-04-05 RX ADMIN — FUROSEMIDE 40 MG: 10 INJECTION, SOLUTION INTRAMUSCULAR; INTRAVENOUS at 21:38

## 2018-04-05 RX ADMIN — DIGOXIN 500 MCG: 0.25 INJECTION INTRAMUSCULAR; INTRAVENOUS at 14:07

## 2018-04-05 RX ADMIN — FUROSEMIDE 40 MG: 40 TABLET ORAL at 08:32

## 2018-04-05 RX ADMIN — TEMAZEPAM 15 MG: 15 CAPSULE ORAL at 00:47

## 2018-04-05 RX ADMIN — METOPROLOL SUCCINATE 50 MG: 50 TABLET, FILM COATED, EXTENDED RELEASE ORAL at 21:26

## 2018-04-05 RX ADMIN — METOPROLOL SUCCINATE 50 MG: 50 TABLET, FILM COATED, EXTENDED RELEASE ORAL at 08:32

## 2018-04-05 RX ADMIN — FUROSEMIDE 40 MG: 10 INJECTION, SOLUTION INTRAMUSCULAR; INTRAVENOUS at 14:06

## 2018-04-05 RX ADMIN — DIGOXIN 125 MCG: 0.12 TABLET ORAL at 11:46

## 2018-04-05 RX ADMIN — WARFARIN SODIUM 5 MG: 5 TABLET ORAL at 17:53

## 2018-04-05 NOTE — PLAN OF CARE
Problem: Patient Care Overview  Goal: Plan of Care Review  Outcome: Ongoing (interventions implemented as appropriate)   04/05/18 0359   Coping/Psychosocial   Plan of Care Reviewed With patient   Plan of Care Review   Progress improving   OTHER   Outcome Summary Pt's VS have been consistent with previous shift. A fib ranges between 110s-120s. Patient's O2Sats are adequate on O2 2L, though patient stated he felt short of air. Restoril given to help patient sleep and relax. Will continue to monitor.      Goal: Individualization and Mutuality  Outcome: Ongoing (interventions implemented as appropriate)    Goal: Discharge Needs Assessment  Outcome: Ongoing (interventions implemented as appropriate)    Goal: Interprofessional Rounds/Family Conf  Outcome: Ongoing (interventions implemented as appropriate)      Problem: Cardiac Output Decreased (Adult)  Goal: Effective Tissue Perfusion  Outcome: Ongoing (interventions implemented as appropriate)      Problem: Fall Risk (Adult)  Goal: Absence of Fall  Outcome: Ongoing (interventions implemented as appropriate)

## 2018-04-05 NOTE — PLAN OF CARE
Problem: Patient Care Overview  Goal: Plan of Care Review   04/05/18 1808   Coping/Psychosocial   Plan of Care Reviewed With patient   Plan of Care Review   Progress improving   OTHER   Outcome Summary Switched to IV dig and IV lasix. Patient states he feels much better. VSS. Will continue to monitor

## 2018-04-05 NOTE — PROGRESS NOTES
Hospital Follow Up        Chief Complaint: Follow up CHF, atrial fibrillation    Interval History: Feels worse today.  Orthopnea is back.      Objective:     Objective:  Temp:  [97.4 °F (36.3 °C)-98.9 °F (37.2 °C)] 98.1 °F (36.7 °C)  Heart Rate:  [] 98  Resp:  [18] 18  BP: ()/() 116/86     Intake/Output Summary (Last 24 hours) at 04/05/18 1336  Last data filed at 04/05/18 0900   Gross per 24 hour   Intake              480 ml   Output             1250 ml   Net             -770 ml     Body mass index is 25.73 kg/m².  1    04/03/18  0239 04/04/18  0605 04/05/18  0604   Weight: 96.2 kg (212 lb) 96.5 kg (212 lb 11.2 oz) 98.4 kg (217 lb)     Weight change: 1.95 kg (4 lb 4.8 oz)      Physical Exam:   General : Alert, cooperative, in no acute distress.  Neuro: alert,cooperative and oriented  Lungs: CTAB. Normal respiratory effort and rate.  CV:: irregularly, irregular, S1S2, normal S1 and S2, no murmurs, gallops or rubs.  ABD: Soft, nontender, non-distended. positive bowel sounds  Extr: No edema or cyanosis, moves all extremities    Lab Review:     Results from last 7 days  Lab Units 04/05/18  0602 04/04/18  0542  04/02/18 2136   SODIUM mmol/L 131* 131*  < > 127*   POTASSIUM mmol/L 3.9 3.9  < > 4.2   CHLORIDE mmol/L 93* 95*  < > 89*   CO2 mmol/L 25.0 23.9  < > 24.3   BUN mg/dL 18 20  < > 14   CREATININE mg/dL 1.35* 1.41*  < > 1.37*   GLUCOSE mg/dL 111* 121*  < > 83   CALCIUM mg/dL 9.1 8.7  < > 9.0   AST (SGOT) U/L  --   --   --  33   ALT (SGPT) U/L  --   --   --  32   < > = values in this interval not displayed.    Results from last 7 days  Lab Units 04/03/18  0551 04/02/18 2136   TROPONIN T ng/mL 0.051* 0.057*       Results from last 7 days  Lab Units 04/04/18  0542 04/03/18  1053  04/02/18 2136   WBC 10*3/mm3 6.30  --   --  6.49   HEMOGLOBIN g/dL 11.9*  --   --  12.6*   HEMOGLOBIN, POC g/dL  --  12.6  < >  --    HEMATOCRIT % 36.1*  --   --  37.2*   HEMATOCRIT POC %  --  37*  < >  --    PLATELETS  10*3/mm3 232  --   --  229   < > = values in this interval not displayed.    Results from last 7 days  Lab Units 04/05/18  0602   INR  1.15*               Invalid input(s): LDLCALC    Results from last 7 days  Lab Units 04/02/18  2136   PROBNP pg/mL 3,988.0*         I reviewed the patient's new clinical results.  I personally viewed and interpreted the patient's EKG  Current Medications:   Scheduled Meds:  atorvastatin 40 mg Oral Nightly   digoxin 125 mcg Oral Daily   furosemide 40 mg Oral BID   metoprolol succinate XL 50 mg Oral Daily     Continuous Infusions:     Allergies:  Allergies   Allergen Reactions   • Ace Inhibitors    • Lisinopril Angioedema       Assessment:     1. Acute/chronic systolic CHF.  Worsened today.   2. Atrial flutter/fibrillation.  New diagnosis.  Needs better rate control.  May be contributing to symptoms. CHADS-VASc 2, warfarin started.   3. Non-ischemic cardiomyopathy. EF 35%.    4. Moderate to severe mitral regurgitation. Not sure if this is a result of or cause of cardiomyopathy.    5. Severe pulmonary hypertension  6. Hypertension  7. Tobacco use  8.  Etoh use  9.  Hyponatremia.  Improved with diuresis.     -  Will switch back to furosemide IV  -  Give a dose of IV digoxin  -  Increase metoprolol dosage     -  Continue warfarin  -  Check overnight oximetry    Sara Maier MD  04/05/18  1:36 PM

## 2018-04-05 NOTE — PROGRESS NOTES
Clinical Pharmacy Services: Medication History    Hernando Caro is a 57 y.o. male presenting to [facility] for Dilated cardiomyopathy [I42.0]  Pleural effusion, right [J90]  New onset atrial flutter [I48.92]  Dyspnea, unspecified type [R06.00]    He  has a past medical history of Cardiomyopathy; Ejection fraction < 50%; Essential hypertension; and Mitral regurgitation.    Allergies as of 04/02/2018 - Reviewed 04/02/2018   Allergen Reaction Noted   • Ace inhibitors  11/13/2015   • Lisinopril  11/13/2015       Medication information was obtained from: patient    Prior to Admission Medications     Prescriptions Last Dose Informant Patient Reported? Taking?    furosemide (LASIX) 20 MG tablet   No No    Take 1 tablet by mouth Daily.    potassium chloride (K-DUR,KLOR-CON) 10 MEQ CR tablet   No No    Take 1 tablet by mouth Daily.    tadalafil (CIALIS) 20 MG tablet   No No    Take 1 tablet by mouth Daily As Needed for erectile dysfunction.        Medication notes: Of note, recently received Amlodipine and Buproprion but patient states both medications were stopped for leg swelling.      This medication list is complete to the best of my knowledge as of 4/5/2018    Please call if questions.    Thanks, Socrates Nicole, PharmD, BCPS  4/5/2018 3:16 PM

## 2018-04-06 VITALS
RESPIRATION RATE: 18 BRPM | SYSTOLIC BLOOD PRESSURE: 117 MMHG | WEIGHT: 206.8 LBS | HEIGHT: 77 IN | BODY MASS INDEX: 24.42 KG/M2 | TEMPERATURE: 97.4 F | DIASTOLIC BLOOD PRESSURE: 92 MMHG | OXYGEN SATURATION: 95 % | HEART RATE: 89 BPM

## 2018-04-06 LAB
ANION GAP SERPL CALCULATED.3IONS-SCNC: 11.1 MMOL/L
BUN BLD-MCNC: 19 MG/DL (ref 6–20)
BUN/CREAT SERPL: 13 (ref 7–25)
CALCIUM SPEC-SCNC: 9 MG/DL (ref 8.6–10.5)
CHLORIDE SERPL-SCNC: 94 MMOL/L (ref 98–107)
CO2 SERPL-SCNC: 27.9 MMOL/L (ref 22–29)
CREAT BLD-MCNC: 1.46 MG/DL (ref 0.76–1.27)
GFR SERPL CREATININE-BSD FRML MDRD: 60 ML/MIN/1.73
GLUCOSE BLD-MCNC: 99 MG/DL (ref 65–99)
INR PPP: 1.27 (ref 0.9–1.1)
POTASSIUM BLD-SCNC: 4 MMOL/L (ref 3.5–5.2)
PROTHROMBIN TIME: 15.6 SECONDS (ref 11.7–14.2)
SODIUM BLD-SCNC: 133 MMOL/L (ref 136–145)

## 2018-04-06 PROCEDURE — 93010 ELECTROCARDIOGRAM REPORT: CPT | Performed by: INTERNAL MEDICINE

## 2018-04-06 PROCEDURE — 93005 ELECTROCARDIOGRAM TRACING: CPT | Performed by: INTERNAL MEDICINE

## 2018-04-06 PROCEDURE — 85610 PROTHROMBIN TIME: CPT | Performed by: INTERNAL MEDICINE

## 2018-04-06 PROCEDURE — 25010000002 FUROSEMIDE PER 20 MG: Performed by: INTERNAL MEDICINE

## 2018-04-06 PROCEDURE — 80048 BASIC METABOLIC PNL TOTAL CA: CPT | Performed by: INTERNAL MEDICINE

## 2018-04-06 PROCEDURE — 99239 HOSP IP/OBS DSCHRG MGMT >30: CPT | Performed by: INTERNAL MEDICINE

## 2018-04-06 RX ORDER — FUROSEMIDE 40 MG/1
40 TABLET ORAL
Status: DISCONTINUED | OUTPATIENT
Start: 2018-04-06 | End: 2018-04-06 | Stop reason: HOSPADM

## 2018-04-06 RX ORDER — METOPROLOL SUCCINATE 50 MG/1
50 TABLET, EXTENDED RELEASE ORAL EVERY 12 HOURS SCHEDULED
Qty: 60 TABLET | Refills: 3 | Status: SHIPPED | OUTPATIENT
Start: 2018-04-06 | End: 2018-04-13 | Stop reason: SDUPTHER

## 2018-04-06 RX ORDER — ATORVASTATIN CALCIUM 40 MG/1
40 TABLET, FILM COATED ORAL NIGHTLY
Qty: 30 TABLET | Refills: 3 | Status: SHIPPED | OUTPATIENT
Start: 2018-04-06 | End: 2018-05-23 | Stop reason: HOSPADM

## 2018-04-06 RX ORDER — FUROSEMIDE 40 MG/1
40 TABLET ORAL 2 TIMES DAILY
Qty: 60 TABLET | Refills: 3 | Status: SHIPPED | OUTPATIENT
Start: 2018-04-06 | End: 2018-08-10

## 2018-04-06 RX ORDER — DIGOXIN 125 MCG
125 TABLET ORAL
Qty: 30 TABLET | Refills: 3 | Status: SHIPPED | OUTPATIENT
Start: 2018-04-07 | End: 2018-05-23 | Stop reason: HOSPADM

## 2018-04-06 RX ORDER — WARFARIN SODIUM 5 MG/1
5 TABLET ORAL
Qty: 30 TABLET | Refills: 3 | Status: SHIPPED | OUTPATIENT
Start: 2018-04-06 | End: 2018-10-10 | Stop reason: SDUPTHER

## 2018-04-06 RX ADMIN — FUROSEMIDE 40 MG: 40 TABLET ORAL at 14:21

## 2018-04-06 RX ADMIN — DIGOXIN 125 MCG: 0.12 TABLET ORAL at 11:35

## 2018-04-06 RX ADMIN — FUROSEMIDE 40 MG: 10 INJECTION, SOLUTION INTRAMUSCULAR; INTRAVENOUS at 06:22

## 2018-04-06 RX ADMIN — METOPROLOL SUCCINATE 50 MG: 50 TABLET, FILM COATED, EXTENDED RELEASE ORAL at 08:16

## 2018-04-06 NOTE — PLAN OF CARE
"Problem: Patient Care Overview  Goal: Plan of Care Review   04/06/18 0323 04/06/18 0324   Coping/Psychosocial   Plan of Care Reviewed With --  patient   Plan of Care Review   Progress --  improving   OTHER   Outcome Summary denies pain during shift; states that he \"feels much better\"'; vss; up ad michelle; voiding function intact; possible d/c in AM --          "

## 2018-04-06 NOTE — PROGRESS NOTES
Continued Stay Note  [facility]     Patient Name: Hernando aCro  MRN: [id number]  Today's Date: 4/6/2018    Admit Date: 4/2/2018          Discharge Plan     Row Name 04/06/18 1523       Plan    Plan Home with O2 at [facility] from [facility]     Plan Comments Pt qualifies for O2 at HS. [facility] updated. [facility] to deliver. Pt updated at bedside. Brandon RN/CCP               Discharge Codes    No documentation.       Expected Discharge Date and Time     Expected Discharge Date Expected Discharge Time    Apr 6, 2018             Christine Roberts RN

## 2018-04-06 NOTE — DISCHARGE SUMMARY
Hospital Discharge    Patient Name: Hernando Caro  Age/Sex: 57 y.o. male  : 1960  MRN: [id number]    Encounter Provider: Sara Maier MD  Referring Provider: Christine Mo MD  Place of Service: [facility] CARDIOLOGY  Patient Care Team:  Jerald Sebastian MD as PCP - General  Jerald Sebastian MD as PCP - Family Medicine         Date of Discharge:  2018     Date of Admit: 2018    Discharge Condition: Stable    Discharge Diagnosis:  1. Non-ischemic cardiomyopathy  2. Persistent atrial fibrillation/flutter  3. Chronic systolic CHF  4. Mild non-obstructive coronary artery disease  5. Chronic kidney disease      Hospital Course:   Hernando Caro is a 57 y.o. male with a history of newly diagnosed cardiomyopathy with an EF of 35%, hypertension, moderate to severe mitral regurgitation, severe pulmonary hypertension, tobacco use, alcohol use who was admitted on 2018 with volume overload and new onset atrial flutter.      The patient was evaluated by Dr. Mast as a new patient on on 3/26/18 following an abnormal echocardiogram.  The patient had been having issues with dyspnea on exertion so an echocardiogram was ordered by his PCP.  Prior to that visit he was started on furosemide 20 mg based on an elevated BNP with some improvement in his symptoms. His echo showed moderately decreased left ventricular systolic function, left ventricular wall segments are hypokinetic, moderate-to-severely dilated left ventricular cavity, grade III left ventricular diastolic dysfunction, mild-to-moderately dilated right ventricular cavity, moderate-to-severely dilated left atrial cavity size, moderate-to-severe mitral valve regurgitation and moderate tricuspid valve regurgitation with an EF of 35%. Following his visit with Dr. Mast he was set up for a right and left cardiac catheterization and no other changes were made to his management at that time.      He was originally  scheduled for 4/6/18 but yesterday called the office with worsening dyspnea.  He reported that his breathing and lower extremity edema worsened over the weekend.  His dyspnea on exertion was associated with near syncope.  He also reports orthopnea for the last couple of nights.  Yesterday he began feeling more palpitations.  He called the office and it was recommended that he come to the emergency room.  On arrival he was noted to be in atrial flutter with rates in the 140's.  Work up including a CT angiogram of the chest was negative for a pulmonary embolus but showed evidence of volume overload.  He was treated with IV metoprolol with improvement of his heart rate and a dose of IV furosemide with improvement of his dyspnea by the following morning.     He underwent right and left cardiac catheterization on 4/3 which showed mild pulmonary hypertension and mild non-obstructive coronary artery disease.  He was started on metoprolol succinate in addition to digoxin with improved rate control.  His atrial flutter transitioned into atrial fibrillation prior to discharge.  Due to his CHADS-VASc score of 2, I recommended anticoagulation.  Due to his mitral regurgitation, I recommended warfarin which was started during his admission.  He was not started on ace inhibitor or ARB due to a history of angioedema with ace inhibitors.  He was initially placed back on oral furosemide the day after his cath but he developed recurrent symptoms.   This resolved after receiving additional IV furosemide.  On the day of discharge his heart rates were in the 80's-100's and he was sat'ing normally on room air.      He will return to office on 4/9 for INR.  He will follow up with JANET Borges in 1 week and Dr. Mast in 4 weeks.  I recommended that he remain off of work until his 1 week follow up.    Objective:  Temp:  [97.4 °F (36.3 °C)-98.9 °F (37.2 °C)] 97.4 °F (36.3 °C)  Heart Rate:  [] 89  Resp:  [18-20] 18  BP:  (108-118)/(87-94) 117/92    Intake/Output Summary (Last 24 hours) at 04/06/18 1453  Last data filed at 04/06/18 1239   Gross per 24 hour   Intake              580 ml   Output             2050 ml   Net            -1470 ml     Body mass index is 24.52 kg/m².  1    04/04/18  0605 04/05/18  0604 04/06/18  0619   Weight: 96.5 kg (212 lb 11.2 oz) 98.4 kg (217 lb) 93.8 kg (206 lb 12.8 oz)     Weight change: -4.627 kg (-10 lb 3.2 oz)    Physical Exam:   General Appearance:    No acute distress, alert and oriented x4   Lungs:     Clear to auscultation bilaterally     Heart:    Regular rhythm and normal rate.  No murmurs, gallops, or       rubs.   Abdomen:     Soft, non-tender, non-distended.    Extremities:   Moves all extremities well.  No clubbing, cyanosis, or edema.       Procedures Performed  Procedure(s):  Coronary angiography  Left heart cath  Left ventriculography  Right heart cath       Consults:  Consults     Date and Time Order Name Status Description    4/3/2018 0000 LCG (on-call MD unless specified) Completed           Pertinent Test Results:    Results from last 7 days  Lab Units 04/06/18  0553 04/05/18  0602 04/04/18  0542 04/03/18  0551 04/02/18 2136   SODIUM mmol/L 133* 131* 131* 128* 127*   POTASSIUM mmol/L 4.0 3.9 3.9 4.4 4.2   CHLORIDE mmol/L 94* 93* 95* 91* 89*   CO2 mmol/L 27.9 25.0 23.9 22.9 24.3   BUN mg/dL 19 18 20 16 14   CREATININE mg/dL 1.46* 1.35* 1.41* 1.31* 1.37*   GLUCOSE mg/dL 99 111* 121* 89 83   CALCIUM mg/dL 9.0 9.1 8.7 8.9 9.0   AST (SGOT) U/L  --   --   --   --  33   ALT (SGPT) U/L  --   --   --   --  32       Results from last 7 days  Lab Units 04/03/18  0551 04/02/18  2136   TROPONIN T ng/mL 0.051* 0.057*       Results from last 7 days  Lab Units 04/04/18  0542 04/03/18  1053 04/03/18  1048 04/03/18  1047 04/02/18  2136   WBC 10*3/mm3 6.30  --   --   --  6.49   HEMOGLOBIN g/dL 11.9*  --   --   --  12.6*   HEMOGLOBIN, POC g/dL  --  12.6 12.2 12.6  --    HEMATOCRIT % 36.1*  --   --   --   37.2*   HEMATOCRIT POC %  --  37* 36* 37*  --    PLATELETS 10*3/mm3 232  --   --   --  229       Results from last 7 days  Lab Units 04/06/18  0553 04/05/18  0602   INR  1.27* 1.15*               Invalid input(s): LDLCALC    Results from last 7 days  Lab Units 04/02/18  2136   PROBNP pg/mL 3,988.0*           Discharge Medications   Hernando Caro   Home Medication Instructions SANTIAGO:[id number]    Printed on:04/06/18 8705   Medication Information                      atorvastatin (LIPITOR) 40 MG tablet  Take 1 tablet by mouth Every Night.             digoxin (LANOXIN) 125 MCG tablet  Take 1 tablet by mouth Daily.             furosemide (LASIX) 40 MG tablet  Take 1 tablet by mouth 2 (Two) Times a Day.             metoprolol succinate XL (TOPROL-XL) 50 MG 24 hr tablet  Take 1 tablet by mouth Every 12 (Twelve) Hours.             potassium chloride (K-DUR,KLOR-CON) 10 MEQ CR tablet  Take 1 tablet by mouth Daily.             tadalafil (CIALIS) 20 MG tablet  Take 1 tablet by mouth Daily As Needed for erectile dysfunction.             warfarin (COUMADIN) 5 MG tablet  Take 1 tablet by mouth Daily.                 Discharge Diet:    Dietary Orders     Start     Ordered    04/03/18 1106  Diet Regular; Consistent Carbohydrate, Cardiac  Diet Effective Now     Question Answer Comment   Diet Texture / Consistency Regular    Common Modifiers Consistent Carbohydrate    Common Modifiers Cardiac        04/03/18 1105          Activity at Discharge:   as tolerated    Discharge disposition: home     Discharge Instructions and Follow ups:  Future Appointments  Date Time Provider Department Center   4/13/2018 8:30 AM JANET Pino CD [facility] None   5/11/2018 10:40 AM MD DANO Bro CD [facility] None     Additional Instructions for the Follow-ups that You Need to Schedule     Discharge Follow-up with Specialty: Please get your INR checked this Monday on April 9th at [facility] INR clinic and follow up with  Claudette Motta in 1 week and Dr. Mast in 4 weeks    As directed      Specialty:  Please get your INR checked this Monday on April 9th at [facility] INR clinic and follow up with Claudette Motta in 1 week and Dr. Mast in 4 weeks         Discharge Follow-up with Specified Provider: Dr. Mast; 1 Month    As directed      To:  Dr. Mast    Follow Up:  1 Month         Discharge Follow-up with Specified Provider: JANET Borges; 1 Week    As directed      To:  JANET Borges    Follow Up:  1 Week    Follow Up Details:  Office will call with appointment           Follow-up Information     Jerald Sebastian MD .    Specialty:  Internal Medicine  Contact information:  [address] [place] [id number]  [phone]                      Sara Maier MD  04/06/18  2:53 PM    Time: Discharge 45 min

## 2018-04-06 NOTE — PLAN OF CARE
Problem: Patient Care Overview  Goal: Plan of Care Review  Outcome: Ongoing (interventions implemented as appropriate)      Problem: Cardiac Output Decreased (Adult)  Goal: Effective Tissue Perfusion  Outcome: Ongoing (interventions implemented as appropriate)      Problem: Fall Risk (Adult)  Goal: Absence of Fall  Outcome: Ongoing (interventions implemented as appropriate)

## 2018-04-09 ENCOUNTER — TELEPHONE (OUTPATIENT)
Dept: FAMILY MEDICINE CLINIC | Facility: CLINIC | Age: 58
End: 2018-04-09

## 2018-04-09 ENCOUNTER — TELEPHONE (OUTPATIENT)
Dept: SOCIAL WORK | Facility: HOSPITAL | Age: 58
End: 2018-04-09

## 2018-04-09 NOTE — TELEPHONE ENCOUNTER
PATIENT HAS FMLA PAPERS THAT NEED TO BE FILED OUT DOES PATIENT NEED TO BE SEEN OR CAN PAPERS JUST BE DROPPED OFF

## 2018-04-09 NOTE — TELEPHONE ENCOUNTER
TT the [facility] and they are going to call him to have him to set up a new appt to have his inr drawn.  He went to the office today but they told him his appt is not til next week and sent him home instead of sending him to the clinic.   He is also contacting Chiki/ Darrion'krishna GALEAS to call him to answer questions about his medication and if there is a different oxygen therapy Darrion can order that is smaller to fit into his house better.

## 2018-04-13 ENCOUNTER — TELEPHONE (OUTPATIENT)
Dept: CARDIOLOGY | Facility: CLINIC | Age: 58
End: 2018-04-13

## 2018-04-13 ENCOUNTER — OFFICE VISIT (OUTPATIENT)
Dept: CARDIOLOGY | Facility: CLINIC | Age: 58
End: 2018-04-13

## 2018-04-13 ENCOUNTER — LAB (OUTPATIENT)
Dept: LAB | Facility: HOSPITAL | Age: 58
End: 2018-04-13

## 2018-04-13 ENCOUNTER — HOSPITAL ENCOUNTER (OUTPATIENT)
Dept: CARDIOLOGY | Facility: HOSPITAL | Age: 58
Setting detail: RECURRING SERIES
Discharge: HOME | End: 2018-04-13

## 2018-04-13 VITALS
HEART RATE: 112 BPM | DIASTOLIC BLOOD PRESSURE: 70 MMHG | HEIGHT: 77 IN | SYSTOLIC BLOOD PRESSURE: 118 MMHG | BODY MASS INDEX: 25.27 KG/M2 | WEIGHT: 214 LBS

## 2018-04-13 DIAGNOSIS — E87.1 HYPONATREMIA: Primary | ICD-10-CM

## 2018-04-13 DIAGNOSIS — I50.41 ACUTE COMBINED SYSTOLIC AND DIASTOLIC CONGESTIVE HEART FAILURE (HCC): ICD-10-CM

## 2018-04-13 DIAGNOSIS — I48.92 NEW ONSET ATRIAL FLUTTER (HCC): ICD-10-CM

## 2018-04-13 DIAGNOSIS — I34.0 MODERATE MITRAL REGURGITATION: ICD-10-CM

## 2018-04-13 DIAGNOSIS — I42.0 DILATED CARDIOMYOPATHY (HCC): Primary | ICD-10-CM

## 2018-04-13 DIAGNOSIS — I10 ESSENTIAL HYPERTENSION: ICD-10-CM

## 2018-04-13 DIAGNOSIS — I07.1 MODERATE TRICUSPID REGURGITATION: ICD-10-CM

## 2018-04-13 DIAGNOSIS — I42.0 DILATED CARDIOMYOPATHY (HCC): ICD-10-CM

## 2018-04-13 PROBLEM — N28.9 RENAL INSUFFICIENCY: Status: ACTIVE | Noted: 2018-04-13

## 2018-04-13 LAB
ANION GAP SERPL CALCULATED.3IONS-SCNC: 14.6 MMOL/L
BUN BLD-MCNC: 15 MG/DL (ref 6–20)
BUN/CREAT SERPL: 11.4 (ref 7–25)
CALCIUM SPEC-SCNC: 9.1 MG/DL (ref 8.6–10.5)
CHLORIDE SERPL-SCNC: 82 MMOL/L (ref 98–107)
CO2 SERPL-SCNC: 25.4 MMOL/L (ref 22–29)
CREAT BLD-MCNC: 1.32 MG/DL (ref 0.76–1.27)
GFR SERPL CREATININE-BSD FRML MDRD: 68 ML/MIN/1.73
GLUCOSE BLD-MCNC: 107 MG/DL (ref 65–99)
POTASSIUM BLD-SCNC: 4.1 MMOL/L (ref 3.5–5.2)
SODIUM BLD-SCNC: 122 MMOL/L (ref 136–145)

## 2018-04-13 PROCEDURE — 99214 OFFICE O/P EST MOD 30 MIN: CPT | Performed by: NURSE PRACTITIONER

## 2018-04-13 PROCEDURE — 36416 COLLJ CAPILLARY BLOOD SPEC: CPT

## 2018-04-13 PROCEDURE — 85610 PROTHROMBIN TIME: CPT

## 2018-04-13 PROCEDURE — 36415 COLL VENOUS BLD VENIPUNCTURE: CPT

## 2018-04-13 PROCEDURE — 80048 BASIC METABOLIC PNL TOTAL CA: CPT

## 2018-04-13 PROCEDURE — 93000 ELECTROCARDIOGRAM COMPLETE: CPT | Performed by: NURSE PRACTITIONER

## 2018-04-13 RX ORDER — METOPROLOL SUCCINATE 50 MG/1
TABLET, EXTENDED RELEASE ORAL
Qty: 90 TABLET | Refills: 3 | Status: SHIPPED | OUTPATIENT
Start: 2018-04-13 | End: 2018-05-11

## 2018-04-13 RX ORDER — SPIRONOLACTONE 25 MG/1
12.5 TABLET ORAL DAILY
Qty: 15 TABLET | Refills: 11 | Status: SHIPPED | OUTPATIENT
Start: 2018-04-13 | End: 2018-09-18

## 2018-04-13 NOTE — TELEPHONE ENCOUNTER
Lab work noted. Sodium low. Discussed with Dr. Mast. Reduce furosemide to 40 mg daily. 1000ml fluid restriction over the weekend.recheck bmp on Monday. I have left message for pt to return my call

## 2018-04-13 NOTE — PROGRESS NOTES
Date of Office Visit: 2018  Encounter Provider: JANET Pino  Place of Service: [facility] CARDIOLOGY  Patient Name: Hernando Caro  :1960    Chief Complaint   Patient presents with   • Cardiomyopathy, unspecified type     Hospital follow up   :     HPI: Hernando Caro is a 57 y.o. male comes in today for Hospital follow-up.  He is a patient Dr. Mast.  I'm seeing him for the first time today and have reviewed his records.    He has a history of cardiomyopathy, kidney disease, hypertension on a persistent atrial fibrillation/flutter and mild coronary disease.    On 2018 he was referred to our office for an echocardiogram.    Patient's echocardiogram was found to be abnormal. His echocardiogram showed an EF of 35%, moderate to severely dilated left ventricle, mild concentric hypertrophy, grade 3 diastolic dysfunction, mild to moderately dilated right ventricle, moderate to severely dilated left atrium, moderate to severe MR, moderate TR, and RVSP greater than 55 mmHg. It was actually measured at 78 mmHg. The echocardiogram had actually been ordered by Dr. Sebastian's office. The patient saw Dr. Mast after the echocardiogram.     He had been complaining that the past several weeks he had been short of breath. He had been treated for bronchitis but did not improve. He had an elevated BNP, which is why the echocardiogram was ordered. He had been placed on Lasix and potassium. Dr. Mast set the patient up for a cardiac catheterization. Before the cardiac catheterization could be completed, the patient was feeling worse and was advised to go to the ER. On arrival to the emergency room he was noted to be in atrial flutter with the rate in the 140s. CT angiogram of the chest was negative for pulmonary embolus but showed evidence of volume overload. He underwent a left and right heart catheterization which showed mild pulmonary hypertension and mild  "nonobstructive coronary disease. He was started on metoprolol succinate in addition to digoxin with improved rate control. His Aflutter transitioned into Afib. He was started on warfarin due to his mitral regurgitation. He was not started on an ACE inhibitor or ARB due to history of angioedema with ACE inhibitors. He was placed on oral furosemide.      Today, he comes in for follow up. Denies feeling his heart racing. He does complain of shortness of breath. It is improved but he does notice if he talks or gets frustrated, he feels more short of breath. Feels like he is \"drowning\" if he lays flat. This is improved but still there. Denies dizziness. Denies chest pain or pressure. Ankles still have edema. He reports that he doesn't have much an appetite. He is drinking boost. He has not had his INR checked since he has been  Home. No arrangements were made at discharge for this to be checked.     He is an  for [employer].    Past Medical History:   Diagnosis Date   • Bronchitis     2018   • Cardiomyopathy    • Chronic systolic CHF (congestive heart failure)    • CKD (chronic kidney disease)    • Ejection fraction < 50%    • Essential hypertension    • Mild CAD     Non-obstructive   • Mitral regurgitation    • Persistent atrial fibrillation     flutter   • SOB (shortness of breath)        Past Surgical History:   Procedure Laterality Date   • BACK SURGERY     • CARDIAC CATHETERIZATION N/A 4/3/2018    Procedure: Coronary angiography;  Surgeon: Geovany Guadalupe MD;  Location: [facility] INVASIVE LOCATION;  Service: Cardiovascular   • CARDIAC CATHETERIZATION N/A 4/3/2018    Procedure: Left heart cath;  Surgeon: Geovany Guadalupe MD;  Location: [facility] INVASIVE LOCATION;  Service: Cardiovascular   • CARDIAC CATHETERIZATION N/A 4/3/2018    Procedure: Left ventriculography;  Surgeon: Geovany Guadalupe MD;  Location: [facility] INVASIVE LOCATION;  Service: Cardiovascular   • CARDIAC " "CATHETERIZATION N/A 4/3/2018    Procedure: Right heart cath;  Surgeon: Geovany Guadalupe MD;  Location: [facility] CATH INVASIVE LOCATION;  Service: Cardiovascular           Review of Systems   Constitution: Positive for malaise/fatigue.   Cardiovascular: Positive for dyspnea on exertion and orthopnea.   Respiratory: Positive for shortness of breath.      All other systems reviewed and are negative    Allergies   Allergen Reactions   • Ace Inhibitors    • Lisinopril Angioedema       All aspects of family and social history reviewed.           Objective:     Vitals:    04/13/18 0840   BP: 118/70   BP Location: Left arm   Pulse: 112   Weight: 97.1 kg (214 lb)   Height: 195.6 cm (77\")     Body mass index is 25.38 kg/m².    PHYSICAL EXAM:  Physical Exam   Constitutional: He is oriented to person, place, and time. He appears well-developed and well-nourished.   HENT:   Head: Normocephalic and atraumatic.   Eyes: Conjunctivae are normal.   Neck: Normal range of motion. Neck supple. No JVD present.   Cardiovascular: Normal rate, normal heart sounds and intact distal pulses.  An irregularly irregular rhythm present.   Pulses:       Carotid pulses are 2+ on the right side, and 2+ on the left side.       Radial pulses are 2+ on the right side, and 2+ on the left side.        Dorsalis pedis pulses are 2+ on the right side, and 2+ on the left side.   Pulmonary/Chest: Effort normal and breath sounds normal. No accessory muscle usage. No respiratory distress. He has no rales.   Abdominal: Soft. Normal appearance and bowel sounds are normal. There is no tenderness.   Musculoskeletal: Normal range of motion. He exhibits no edema.   Neurological: He is alert and oriented to person, place, and time.   Skin: Skin is warm, dry and intact. He is not diaphoretic.   Psychiatric: He has a normal mood and affect. His speech is normal and behavior is normal. Judgment and thought content normal. Cognition and memory are normal.   Vitals " reviewed.        ECG 12 Lead  Date/Time: 4/13/2018 9:30 AM  Performed by: CHELSIE PARK  Authorized by: CHELSIE PARK   Comparison: compared with previous ECG from 4/6/2018  Rhythm: atrial flutter and atrial fibrillation  Rate: tachycardic  BPM: 112  Conduction: conduction normal  QRS axis: normal  Clinical impression: abnormal ECG  Comments: Patient: Atrial fib                Assessment:       Diagnosis Plan   1. Dilated cardiomyopathy  Basic Metabolic Panel   2. Acute combined systolic and diastolic congestive heart failure     3. Moderate mitral regurgitation     4. Moderate tricuspid regurgitation     5. Essential hypertension     6. New onset atrial flutter          Orders Placed This Encounter   Procedures   • Basic Metabolic Panel     Standing Status:   Future     Standing Expiration Date:   4/13/2019   • ECG 12 Lead     This order was created via procedure documentation       Current Outpatient Prescriptions   Medication Sig Dispense Refill   • albuterol (PROVENTIL HFA;VENTOLIN HFA) 108 (90 Base) MCG/ACT inhaler Inhale 2 puffs Every 4 (Four) Hours As Needed for Wheezing.     • atorvastatin (LIPITOR) 40 MG tablet Take 1 tablet by mouth Every Night. 30 tablet 3   • digoxin (LANOXIN) 125 MCG tablet Take 1 tablet by mouth Daily. 30 tablet 3   • doxycycline (VIBRAMYCIN) 100 MG capsule 1 po bid 28 capsule 0   • furosemide (LASIX) 40 MG tablet Take 1 tablet by mouth 2 (Two) Times a Day. 60 tablet 3   • MethylPREDNISolone (MEDROL) 4 MG tablet follow package directions 1 each 0   • metoprolol succinate XL (TOPROL-XL) 50 MG 24 hr tablet 1.5 tablets BID 90 tablet 3   • spironolactone (ALDACTONE) 25 MG tablet Take 0.5 tablets by mouth Daily. 15 tablet 11   • tadalafil (CIALIS) 20 MG tablet Take 1 tablet by mouth Daily As Needed for erectile dysfunction. 9 tablet 5   • warfarin (COUMADIN) 5 MG tablet Take 1 tablet by mouth Daily. 30 tablet 3     No current facility-administered medications for this visit.              Plan:       1. Cardiomyopathy-Non ischemic EF 35%. Mild CAD.  Still with shortness of breath and orthopnea.  Tolerating Toprol-XL.  Heart rate still fast.  Unable to use ACE inhibitor or angiotensin receptor blocker due to history of angioedema.  In the future, would like to add isosorbide/hydralazine.  Will add spironolactone 12.5 mg daily today.  Continue current dose of Lasix.  Stop potassium.  Check BMP today and repeat BMP in 10 days.  Advised to refrain from alcohol.  Advised no working at this time.  No lifting greater than 10 pounds or standing for long periods of time.    Heart Failure  Assessment  • NYHA class III-A - There is limitation of physical activity. The patient is comfortable at rest, but ordinary activity causes fatigue, palpitations or shortness of breath.  • The patient was newly diagnosed with heart failure within the past 12 months  • ACE inhibitor not prescribed for medical reasons  • Beta blocker prescribed  • Diuretics prescribed  • Angiotensin receptor blocker (ARB) not prescribed for medical reasons  • Left ventricular function is moderately reduced by qualitative assessment    Plan  • The patient has received heart failure education on the following topics: dietary sodium restriction, medication instructions, minimizing or avoiding NSAID use, symptom management, physical activity, weight monitoring and minimizing alcohol intake  • The heart failure care plan was discussed with the patient today including: up-titrating HF medications    Subjective/Objective  • The patient reports dyspnea and orthopnea    • Physical exam findings negative for rales, peripheral edema and elevated JVP.        2. Atrial fib  Atrial Fibrillation and Atrial Flutter  Assessment  • The patient has persistent atrial fibrillation  • This is valvular in etiology  • The patient's CHADS2-VASc score is 3  • A [id number]-[id number] score of 2 or more is considered a high risk for a thromboembolic event  • Warfarin  prescribed    Plan  • Continue in atrial fibrillation with rate control  • Continue warfarin for antithrombotic therapy, bleeding issues discussed  • Continue beta blocker for rate control  Heart rate in the 110's today.  Increase Toprol-XL to 75 mg twice a day.  Patient is anticoagulated with warfarin.  He has not had INR checked since being at home.  Patient was taken to INR clinic and educated on the importance of getting INR drawn.  Discussed signs and symptoms of bleeding.    3. Mild non obstructive CAD-denies angina    4.  Mild renal insufficiency-creatinine 1.3-1.4 during hospital stay.  Check BMP today.    5.  Hypertension-blood pressure stable on current regimen.  Increasing Toprol-XL.  Adding low-dose spironolactone due to the patient is still [place] Heart Association class 2-3.    6. Mitral and tricuspid regurg    Follow up in office in 10 days.  Short-term disability and FMLA paperwork filled out for patient today.    As always, it has been a pleasure to participate in this patient's care.      Sincerely,      JANET Pino

## 2018-04-18 ENCOUNTER — TELEPHONE (OUTPATIENT)
Dept: CARDIOLOGY | Facility: CLINIC | Age: 58
End: 2018-04-18

## 2018-04-18 NOTE — TELEPHONE ENCOUNTER
I reminded pt to go get labs - can not go today but will go tomorrow morning.  Do you need to place an order in epic?gordy

## 2018-04-18 NOTE — TELEPHONE ENCOUNTER
Pt was to get a bmp on Monday. Can we please call to remind him to go. His sodium was critically low

## 2018-04-19 ENCOUNTER — HOSPITAL ENCOUNTER (OUTPATIENT)
Dept: CARDIOLOGY | Facility: HOSPITAL | Age: 58
Setting detail: RECURRING SERIES
Discharge: HOME | End: 2018-04-19

## 2018-04-19 PROCEDURE — 85610 PROTHROMBIN TIME: CPT

## 2018-04-19 PROCEDURE — 36416 COLLJ CAPILLARY BLOOD SPEC: CPT

## 2018-04-20 NOTE — TELEPHONE ENCOUNTER
I called pt again - he got his pt/inr done and thought that was all we needwed.  I explained to him he needed to go to the hosp lab for a different lab - not his pt/inr.  He says he will go in the morning/shobha

## 2018-04-21 ENCOUNTER — LAB REQUISITION (OUTPATIENT)
Dept: LAB | Facility: HOSPITAL | Age: 58
End: 2018-04-21

## 2018-04-21 DIAGNOSIS — E87.1 HYPO-OSMOLALITY AND HYPONATREMIA (CODE): ICD-10-CM

## 2018-04-21 LAB
ANION GAP SERPL CALCULATED.3IONS-SCNC: 14.4 MMOL/L
BUN BLD-MCNC: 21 MG/DL (ref 6–20)
BUN/CREAT SERPL: 13.7 (ref 7–25)
CALCIUM SPEC-SCNC: 8.6 MG/DL (ref 8.6–10.5)
CHLORIDE SERPL-SCNC: 96 MMOL/L (ref 98–107)
CO2 SERPL-SCNC: 22.6 MMOL/L (ref 22–29)
CREAT BLD-MCNC: 1.53 MG/DL (ref 0.76–1.27)
GFR SERPL CREATININE-BSD FRML MDRD: 57 ML/MIN/1.73
GLUCOSE BLD-MCNC: 122 MG/DL (ref 65–99)
POTASSIUM BLD-SCNC: 3.8 MMOL/L (ref 3.5–5.2)
SODIUM BLD-SCNC: 133 MMOL/L (ref 136–145)

## 2018-04-21 PROCEDURE — 80048 BASIC METABOLIC PNL TOTAL CA: CPT | Performed by: NURSE PRACTITIONER

## 2018-04-23 ENCOUNTER — TELEPHONE (OUTPATIENT)
Dept: CARDIOLOGY | Facility: CLINIC | Age: 58
End: 2018-04-23

## 2018-04-23 NOTE — TELEPHONE ENCOUNTER
4/23/18  Pt. Left vmsg at 9:00 so unsure if this was prior to your conversation with him today.  He states he gets soa when he coughs and asked to talk to you about it./shobha/shobha

## 2018-04-23 NOTE — TELEPHONE ENCOUNTER
Reviewed lab results with patient. Pt is still short of breath per his report. Feels like some of it is related to anxiety. Will f/u in office on Wednesday

## 2018-04-25 ENCOUNTER — HOSPITAL ENCOUNTER (OUTPATIENT)
Dept: CARDIOLOGY | Facility: HOSPITAL | Age: 58
Setting detail: RECURRING SERIES
Discharge: HOME | End: 2018-04-25

## 2018-04-25 ENCOUNTER — OFFICE VISIT (OUTPATIENT)
Dept: CARDIOLOGY | Facility: CLINIC | Age: 58
End: 2018-04-25

## 2018-04-25 VITALS
HEIGHT: 77 IN | HEART RATE: 93 BPM | DIASTOLIC BLOOD PRESSURE: 88 MMHG | SYSTOLIC BLOOD PRESSURE: 118 MMHG | BODY MASS INDEX: 24.79 KG/M2 | WEIGHT: 210 LBS

## 2018-04-25 DIAGNOSIS — I42.0 DILATED CARDIOMYOPATHY (HCC): Primary | ICD-10-CM

## 2018-04-25 DIAGNOSIS — I48.92 NEW ONSET ATRIAL FLUTTER (HCC): ICD-10-CM

## 2018-04-25 PROCEDURE — 36416 COLLJ CAPILLARY BLOOD SPEC: CPT

## 2018-04-25 PROCEDURE — 99213 OFFICE O/P EST LOW 20 MIN: CPT | Performed by: NURSE PRACTITIONER

## 2018-04-25 PROCEDURE — 85610 PROTHROMBIN TIME: CPT

## 2018-04-25 PROCEDURE — 93000 ELECTROCARDIOGRAM COMPLETE: CPT | Performed by: NURSE PRACTITIONER

## 2018-04-25 NOTE — PROGRESS NOTES
Date of Office Visit: 2018  Encounter Provider: JANET Pino  Place of Service: [facility] CARDIOLOGY  Patient Name: Hernando Caro  :1960    Chief Complaint   Patient presents with   • Shortness of Breath   :     HPI: Hernando Caro is a 57 y.o. male comes in today for 10 day follow up.      He has a history of cardiomyopathy, kidney disease, hypertension on a persistent atrial fibrillation/flutter and mild coronary disease.    On 2018 he was referred to our office for an echocardiogram.    Patient's echocardiogram was found to be abnormal. His echocardiogram showed an EF of 35%, moderate to severely dilated left ventricle, mild concentric hypertrophy, grade 3 diastolic dysfunction, mild to moderately dilated right ventricle, moderate to severely dilated left atrium, moderate to severe MR, moderate TR, and RVSP greater than 55 mmHg. It was actually measured at 78 mmHg. The echocardiogram had actually been ordered by Dr. Sebastian's office. The patient saw Dr. Mast after the echocardiogram.     He had been complaining that the past several weeks he had been short of breath. He had been treated for bronchitis but did not improve. He had an elevated BNP, which is why the echocardiogram was ordered. He had been placed on Lasix and potassium. Dr. Mast set the patient up for a cardiac catheterization. Before the cardiac catheterization could be completed, the patient was feeling worse and was advised to go to the ER. On arrival to the emergency room he was noted to be in atrial flutter with the rate in the 140s. CT angiogram of the chest was negative for pulmonary embolus but showed evidence of volume overload. He underwent a left and right heart catheterization which showed mild pulmonary hypertension and mild nonobstructive coronary disease. He was started on metoprolol succinate in addition to digoxin with improved rate control. His Aflutter  transitioned into Afib. He was started on warfarin due to his mitral regurgitation. He was not started on an ACE inhibitor or ARB due to history of angioedema with ACE inhibitors. He was placed on oral furosemide.      Last office visit, he was started on spironolactone. Sodium was 122 so lasix decreased. Labs redrawn this week and are improved.     He comes in today for follow-up.  Still continues to be short of breath at times especially when climbing stairs or performing endurance activities. He is not back to work but would like to be. He does complain of edema in lower extremities. Denies palpitations or tachycardia,  dizziness, chest pain, fatigue, orthopnea or PND          Past Medical History:   Diagnosis Date   • Bronchitis     2018   • Cardiomyopathy    • Chronic systolic CHF (congestive heart failure)    • CKD (chronic kidney disease)    • Ejection fraction < 50%    • Essential hypertension    • Mild CAD     Non-obstructive   • Mitral regurgitation    • Persistent atrial fibrillation     flutter   • SOB (shortness of breath)        Past Surgical History:   Procedure Laterality Date   • BACK SURGERY     • CARDIAC CATHETERIZATION N/A 4/3/2018    Procedure: Coronary angiography;  Surgeon: Geovany Guadalupe MD;  Location: [facility] INVASIVE LOCATION;  Service: Cardiovascular   • CARDIAC CATHETERIZATION N/A 4/3/2018    Procedure: Left heart cath;  Surgeon: Geovany Guadalupe MD;  Location: [facility] INVASIVE LOCATION;  Service: Cardiovascular   • CARDIAC CATHETERIZATION N/A 4/3/2018    Procedure: Left ventriculography;  Surgeon: Geovany Guadalupe MD;  Location: [facility] INVASIVE LOCATION;  Service: Cardiovascular   • CARDIAC CATHETERIZATION N/A 4/3/2018    Procedure: Right heart cath;  Surgeon: Geovany Guadalupe MD;  Location: [facility] INVASIVE LOCATION;  Service: Cardiovascular           Review of Systems   Constitution: Positive for malaise/fatigue.   Cardiovascular: Positive for  "dyspnea on exertion and orthopnea.   Respiratory: Positive for shortness of breath.      All other systems reviewed and are negative    Allergies   Allergen Reactions   • Ace Inhibitors    • Lisinopril Angioedema       All aspects of family and social history reviewed.           Objective:     Vitals:    04/25/18 0847   BP: 118/88   BP Location: Left arm   Pulse: 93   Weight: 95.3 kg (210 lb)   Height: 195.6 cm (77\")     Body mass index is 24.9 kg/m².    PHYSICAL EXAM:  Physical Exam   Constitutional: He is oriented to person, place, and time. He appears well-developed and well-nourished.   HENT:   Head: Normocephalic and atraumatic.   Eyes: Conjunctivae are normal.   Neck: Normal range of motion. Neck supple. No JVD present.   Cardiovascular: Normal rate, normal heart sounds and intact distal pulses.  An irregularly irregular rhythm present.   Pulses:       Carotid pulses are 2+ on the right side, and 2+ on the left side.       Radial pulses are 2+ on the right side, and 2+ on the left side.        Dorsalis pedis pulses are 2+ on the right side, and 2+ on the left side.   Pulmonary/Chest: Effort normal and breath sounds normal. No accessory muscle usage. No respiratory distress. He has no rales.   Abdominal: Soft. Normal appearance and bowel sounds are normal. There is no tenderness.   Musculoskeletal: Normal range of motion. He exhibits no edema.   Neurological: He is alert and oriented to person, place, and time.   Skin: Skin is warm, dry and intact. He is not diaphoretic.   Psychiatric: He has a normal mood and affect. His speech is normal and behavior is normal. Judgment and thought content normal. Cognition and memory are normal.   Vitals reviewed.        ECG 12 Lead  Date/Time: 4/25/2018 1:27 PM  Performed by: CHELSIE PARK  Authorized by: CHELSIE PARK   Comparison: compared with previous ECG from 4/13/2018  Similar to previous ECG  Rhythm: atrial flutter  Rate: normal  BPM: 93  T depression: V4, V5 " and V6  QRS axis: normal  Clinical impression: abnormal ECG  Comments: Indication: afib                Assessment:       Diagnosis Plan   1. Dilated cardiomyopathy     2. New onset atrial flutter          Orders Placed This Encounter   Procedures   • ECG 12 Lead     This order was created via procedure documentation       Current Outpatient Prescriptions   Medication Sig Dispense Refill   • albuterol (PROVENTIL HFA;VENTOLIN HFA) 108 (90 Base) MCG/ACT inhaler Inhale 2 puffs Every 4 (Four) Hours As Needed for Wheezing.     • atorvastatin (LIPITOR) 40 MG tablet Take 1 tablet by mouth Every Night. 30 tablet 3   • digoxin (LANOXIN) 125 MCG tablet Take 1 tablet by mouth Daily. 30 tablet 3   • doxycycline (VIBRAMYCIN) 100 MG capsule 1 po bid 28 capsule 0   • furosemide (LASIX) 40 MG tablet Take 1 tablet by mouth 2 (Two) Times a Day. 60 tablet 3   • MethylPREDNISolone (MEDROL) 4 MG tablet follow package directions 1 each 0   • metoprolol succinate XL (TOPROL-XL) 50 MG 24 hr tablet 1.5 tablets BID 90 tablet 3   • spironolactone (ALDACTONE) 25 MG tablet Take 0.5 tablets by mouth Daily. 15 tablet 11   • tadalafil (CIALIS) 20 MG tablet Take 1 tablet by mouth Daily As Needed for erectile dysfunction. 9 tablet 5   • warfarin (COUMADIN) 5 MG tablet Take 1 tablet by mouth Daily. 30 tablet 3     No current facility-administered medications for this visit.             Plan:       1. Cardiomyopathy-Non ischemic EF 35%. Mild CAD.  Still with shortness of breath.  Tolerating Toprol-XL.  Heart rate still fast.  Unable to use ACE inhibitor or angiotensin receptor blocker due to history of angioedema.  Sodium improved on lab work.  Creatinine slightly elevated.  Still with some lower extremity edema and shortness of breath.  Increase furosemide to 40 mg in the morning and 20 mg in the evening.  Okay to return to work is able. Will improve after cardioversion hopefully.     Heart Failure  Assessment  • NYHA class III-A - There is limitation  of physical activity. The patient is comfortable at rest, but ordinary activity causes fatigue, palpitations or shortness of breath.  • The patient was newly diagnosed with heart failure within the past 12 months  • ACE inhibitor not prescribed for medical reasons  • Beta blocker prescribed  • Diuretics prescribed  • Angiotensin receptor blocker (ARB) not prescribed for medical reasons  • Left ventricular function is moderately reduced by qualitative assessment    Plan  • The patient has received heart failure education on the following topics: dietary sodium restriction, medication instructions, minimizing or avoiding NSAID use, symptom management, physical activity, weight monitoring and minimizing alcohol intake  • The heart failure care plan was discussed with the patient today including: up-titrating HF medications    Subjective/Objective  • The patient reports dyspnea and orthopnea    • Physical exam findings negative for rales, peripheral edema and elevated JVP.        2. Atrial fib  Atrial Fibrillation and Atrial Flutter  Assessment  • The patient has persistent atrial fibrillation  • This is valvular in etiology  • The patient's CHADS2-VASc score is 3  • A [id number]-MOLg score of 2 or more is considered a high risk for a thromboembolic event  • Warfarin prescribed    Plan  • Continue in atrial fibrillation with rate control  • Continue warfarin for antithrombotic therapy, bleeding issues discussed  • Continue beta blocker for rate control  HR stable today. Abnormal T waves on EKG. Mild non obstructive disease on cath. Continue to monitor. Plan for rhythm treatment after anticoagulated x 4 weeks.     3. Mitral and tricuspid regurg    Follow up in office in 3 weeks with Dr. Mast.  Plan for rhythm control in the future.  Okay to return to work    As always, it has been a pleasure to participate in this patient's care.      Sincerely,      JANET Pino

## 2018-04-30 ENCOUNTER — HOSPITAL ENCOUNTER (OUTPATIENT)
Dept: CARDIOLOGY | Facility: HOSPITAL | Age: 58
Discharge: HOME | End: 2018-04-30
Admitting: NURSE PRACTITIONER

## 2018-04-30 ENCOUNTER — HOSPITAL ENCOUNTER (OUTPATIENT)
Dept: CARDIOLOGY | Facility: HOSPITAL | Age: 58
Setting detail: RECURRING SERIES
Discharge: HOME | End: 2018-04-30

## 2018-04-30 ENCOUNTER — HOSPITAL ENCOUNTER (OUTPATIENT)
Dept: CARDIOLOGY | Facility: HOSPITAL | Age: 58
End: 2018-04-30

## 2018-04-30 ENCOUNTER — OFFICE VISIT (OUTPATIENT)
Dept: CARDIOLOGY | Facility: CLINIC | Age: 58
End: 2018-04-30

## 2018-04-30 VITALS
SYSTOLIC BLOOD PRESSURE: 110 MMHG | BODY MASS INDEX: 24.82 KG/M2 | DIASTOLIC BLOOD PRESSURE: 64 MMHG | HEART RATE: 100 BPM | WEIGHT: 210.2 LBS | HEIGHT: 77 IN

## 2018-04-30 DIAGNOSIS — I50.23 ACUTE ON CHRONIC SYSTOLIC HEART FAILURE (HCC): Primary | ICD-10-CM

## 2018-04-30 DIAGNOSIS — I42.0 DILATED CARDIOMYOPATHY (HCC): Primary | ICD-10-CM

## 2018-04-30 LAB
ALBUMIN SERPL-MCNC: 3.8 G/DL (ref 3.5–5.2)
ALBUMIN/GLOB SERPL: 1.2 G/DL
ALP SERPL-CCNC: 146 U/L (ref 39–117)
ALT SERPL W P-5'-P-CCNC: 37 U/L (ref 1–41)
ANION GAP SERPL CALCULATED.3IONS-SCNC: 11.7 MMOL/L
AST SERPL-CCNC: 40 U/L (ref 1–40)
BASOPHILS # BLD AUTO: 0.04 10*3/MM3 (ref 0–0.2)
BASOPHILS NFR BLD AUTO: 0.6 % (ref 0–1.5)
BILIRUB SERPL-MCNC: 1.4 MG/DL (ref 0.1–1.2)
BUN BLD-MCNC: 17 MG/DL (ref 6–20)
BUN/CREAT SERPL: 10.9 (ref 7–25)
CALCIUM SPEC-SCNC: 9.2 MG/DL (ref 8.6–10.5)
CHLORIDE SERPL-SCNC: 101 MMOL/L (ref 98–107)
CO2 SERPL-SCNC: 27.3 MMOL/L (ref 22–29)
CREAT BLD-MCNC: 1.56 MG/DL (ref 0.76–1.27)
DEPRECATED RDW RBC AUTO: 47 FL (ref 37–54)
EOSINOPHIL # BLD AUTO: 0.2 10*3/MM3 (ref 0–0.7)
EOSINOPHIL NFR BLD AUTO: 3.2 % (ref 0.3–6.2)
ERYTHROCYTE [DISTWIDTH] IN BLOOD BY AUTOMATED COUNT: 14.1 % (ref 11.5–14.5)
GFR SERPL CREATININE-BSD FRML MDRD: 56 ML/MIN/1.73
GLOBULIN UR ELPH-MCNC: 3.1 GM/DL
GLUCOSE BLD-MCNC: 96 MG/DL (ref 65–99)
HCT VFR BLD AUTO: 42.5 % (ref 40.4–52.2)
HGB BLD-MCNC: 13.8 G/DL (ref 13.7–17.6)
IMM GRANULOCYTES # BLD: 0 10*3/MM3 (ref 0–0.03)
IMM GRANULOCYTES NFR BLD: 0 % (ref 0–0.5)
LYMPHOCYTES # BLD AUTO: 2.19 10*3/MM3 (ref 0.9–4.8)
LYMPHOCYTES NFR BLD AUTO: 35.4 % (ref 19.6–45.3)
MCH RBC QN AUTO: 29.8 PG (ref 27–32.7)
MCHC RBC AUTO-ENTMCNC: 32.5 G/DL (ref 32.6–36.4)
MCV RBC AUTO: 91.8 FL (ref 79.8–96.2)
MONOCYTES # BLD AUTO: 0.55 10*3/MM3 (ref 0.2–1.2)
MONOCYTES NFR BLD AUTO: 8.9 % (ref 5–12)
NEUTROPHILS # BLD AUTO: 3.21 10*3/MM3 (ref 1.9–8.1)
NEUTROPHILS NFR BLD AUTO: 51.9 % (ref 42.7–76)
NT-PROBNP SERPL-MCNC: 4273 PG/ML (ref 5–900)
PLATELET # BLD AUTO: 248 10*3/MM3 (ref 140–500)
PMV BLD AUTO: 11 FL (ref 6–12)
POTASSIUM BLD-SCNC: 4.1 MMOL/L (ref 3.5–5.2)
PROT SERPL-MCNC: 6.9 G/DL (ref 6–8.5)
RBC # BLD AUTO: 4.63 10*6/MM3 (ref 4.6–6)
SODIUM BLD-SCNC: 140 MMOL/L (ref 136–145)
WBC NRBC COR # BLD: 6.19 10*3/MM3 (ref 4.5–10.7)

## 2018-04-30 PROCEDURE — 99213 OFFICE O/P EST LOW 20 MIN: CPT | Performed by: NURSE PRACTITIONER

## 2018-04-30 PROCEDURE — 85610 PROTHROMBIN TIME: CPT

## 2018-04-30 PROCEDURE — 25010000002 FUROSEMIDE PER 20 MG: Performed by: NURSE PRACTITIONER

## 2018-04-30 PROCEDURE — 80053 COMPREHEN METABOLIC PANEL: CPT | Performed by: NURSE PRACTITIONER

## 2018-04-30 PROCEDURE — 96376 TX/PRO/DX INJ SAME DRUG ADON: CPT

## 2018-04-30 PROCEDURE — 96374 THER/PROPH/DIAG INJ IV PUSH: CPT

## 2018-04-30 PROCEDURE — 36415 COLL VENOUS BLD VENIPUNCTURE: CPT

## 2018-04-30 PROCEDURE — 85025 COMPLETE CBC W/AUTO DIFF WBC: CPT | Performed by: NURSE PRACTITIONER

## 2018-04-30 PROCEDURE — 36416 COLLJ CAPILLARY BLOOD SPEC: CPT

## 2018-04-30 PROCEDURE — 83880 ASSAY OF NATRIURETIC PEPTIDE: CPT | Performed by: NURSE PRACTITIONER

## 2018-04-30 PROCEDURE — 93000 ELECTROCARDIOGRAM COMPLETE: CPT | Performed by: NURSE PRACTITIONER

## 2018-04-30 RX ORDER — FUROSEMIDE 10 MG/ML
80 INJECTION INTRAMUSCULAR; INTRAVENOUS ONCE
Status: COMPLETED | OUTPATIENT
Start: 2018-04-30 | End: 2018-04-30

## 2018-04-30 RX ADMIN — FUROSEMIDE 80 MG: 10 INJECTION, SOLUTION INTRAMUSCULAR; INTRAVENOUS at 09:39

## 2018-04-30 NOTE — PROGRESS NOTES
Date of Office Visit: 2018  Encounter Provider: JANET Pino  Place of Service: [facility] CARDIOLOGY  Patient Name: Hernando Caro  :1960    Chief Complaint   Patient presents with   • Cough   • Shortness of Breath   :     HPI: Hernando Caro is a 57 y.o. male comes in today for He complains of shortness of breath and cough.      He has a history of cardiomyopathy and persistent atrial fibrillation/atrial flutter, which is a new finding.  His ejection fraction is 35% with moderate to severe dilated left ventricle and moderate to severe mitral regurgitation.  After diagnosis, he was diagnosed with atrial fibrillation and anticoagulated with warfarin.    He has been on medical therapy.  At one point, his sodium was 122, so we had to reduce his furosemide.  He followed up last week.  His sodium was improved.  I increased his furosemide to 40 mg in the morning and 20 mg in the afternoon.    He came in today to have his INR drawn and complained of cough and shortness of breath and wanted to be seen.      He comes in today complaining of increase of cough.  Yesterday afternoon, he started coughing and coughed all day and all night.  He feels more short of breath.  He has worsening mild lower extremity edema.  His weight is stable.  He denies any chest pain or pressure.  He overall just does not feel as well.  He does reports his INR is six today.          Past Medical History:   Diagnosis Date   • Bronchitis     2018   • Cardiomyopathy    • Chronic systolic CHF (congestive heart failure)    • CKD (chronic kidney disease)    • Ejection fraction < 50%    • Essential hypertension    • Mild CAD     Non-obstructive   • Mitral regurgitation    • Persistent atrial fibrillation     flutter   • SOB (shortness of breath)        Past Surgical History:   Procedure Laterality Date   • BACK SURGERY     • CARDIAC CATHETERIZATION N/A 4/3/2018    Procedure: Coronary angiography;   "Surgeon: Geovany Guadalupe MD;  Location: [facility] INVASIVE LOCATION;  Service: Cardiovascular   • CARDIAC CATHETERIZATION N/A 4/3/2018    Procedure: Left heart cath;  Surgeon: Geovany Guadalupe MD;  Location: [facility] CATH INVASIVE LOCATION;  Service: Cardiovascular   • CARDIAC CATHETERIZATION N/A 4/3/2018    Procedure: Left ventriculography;  Surgeon: Geovany Guadalupe MD;  Location: [facility] CATH INVASIVE LOCATION;  Service: Cardiovascular   • CARDIAC CATHETERIZATION N/A 4/3/2018    Procedure: Right heart cath;  Surgeon: Geovany Guadalupe MD;  Location: [facility] CATH INVASIVE LOCATION;  Service: Cardiovascular           Review of Systems   Constitution: Positive for malaise/fatigue.   Cardiovascular: Positive for dyspnea on exertion and leg swelling.   Respiratory: Positive for cough.      All other systems reviewed and are negative    Allergies   Allergen Reactions   • Ace Inhibitors    • Lisinopril Angioedema       All aspects of family and social history reviewed.           Objective:     Vitals:    04/30/18 0904   BP: 110/64   BP Location: Right arm   Pulse: 100   Weight: 95.3 kg (210 lb 3.2 oz)   Height: 195.6 cm (77\")     Body mass index is 24.93 kg/m².    PHYSICAL EXAM:  Physical Exam   Constitutional: He is oriented to person, place, and time. He appears well-developed and well-nourished.   Appears ill   HENT:   Head: Normocephalic and atraumatic.   Eyes: Conjunctivae are normal.   Neck: Normal range of motion. Neck supple. JVD present.   Cardiovascular: Normal rate, normal heart sounds and intact distal pulses.  An irregularly irregular rhythm present.   Pulses:       Carotid pulses are 2+ on the right side, and 2+ on the left side.       Radial pulses are 2+ on the right side, and 2+ on the left side.        Dorsalis pedis pulses are 2+ on the right side, and 2+ on the left side.   Pulmonary/Chest: Effort normal and breath sounds normal. No accessory muscle usage. No respiratory distress. " He has no rales.   Abdominal: Soft. Normal appearance and bowel sounds are normal. He exhibits distension. There is no tenderness.   Musculoskeletal: Normal range of motion. He exhibits edema (trace).   Neurological: He is alert and oriented to person, place, and time.   Skin: Skin is warm, dry and intact. He is not diaphoretic.   Psychiatric: He has a normal mood and affect. His speech is normal and behavior is normal. Judgment and thought content normal. Cognition and memory are normal.   Vitals reviewed.        ECG 12 Lead  Date/Time: 4/30/2018 10:50 AM  Performed by: CHELSIE PARK  Authorized by: CHELSIE PRAK   Comparison: compared with previous ECG from 4/25/2018  Similar to previous ECG  Rhythm: atrial flutter  Rate: tachycardic  BPM: 100  T depression: V6, V5 and V4  QRS axis: normal  Clinical impression: abnormal ECG                Assessment:       Diagnosis Plan   1. Dilated cardiomyopathy          Orders Placed This Encounter   Procedures   • ECG 12 Lead     This order was created via procedure documentation       Current Outpatient Prescriptions   Medication Sig Dispense Refill   • atorvastatin (LIPITOR) 40 MG tablet Take 1 tablet by mouth Every Night. 30 tablet 3   • digoxin (LANOXIN) 125 MCG tablet Take 1 tablet by mouth Daily. 30 tablet 3   • furosemide (LASIX) 40 MG tablet Take 1 tablet by mouth 2 (Two) Times a Day. 60 tablet 3   • metoprolol succinate XL (TOPROL-XL) 50 MG 24 hr tablet 1.5 tablets BID 90 tablet 3   • spironolactone (ALDACTONE) 25 MG tablet Take 0.5 tablets by mouth Daily. 15 tablet 11   • tadalafil (CIALIS) 20 MG tablet Take 1 tablet by mouth Daily As Needed for erectile dysfunction. 9 tablet 5   • warfarin (COUMADIN) 5 MG tablet Take 1 tablet by mouth Daily. 30 tablet 3     No current facility-administered medications for this visit.             Plan:         1. Cardiomyopathy  Heart Failure  Assessment  • NYHA class III-B - There is significant limitation of physical  activity. The patient is comfortable at rest, but minimal activity causes fatigue, palpitations or shortness of breath.  • The patient was newly diagnosed with heart failure within the past 12 months  • Left ventricular function is moderately reduced by qualitative assessment    Plan  • The heart failure care plan was discussed with the patient today including: up-titrating HF medications    Subjective/Objective  • Physical exam findings positive for peripheral edema and elevated JVP.   • Physical exam findings negative for rales.        Patient comes in today and heart failure.  Increased amount of shortness of breath and coughing.  His color does not look great to me.  His abdomen is more distended.  His weight has not changed.  Lab work looks improved.  His creatinine remains 1.5 the sodium 140.  His bilirubin is 1.4.  I discussed case with Dr. Mast.  We will attempt to diurese as an outpatient if possible.  80 mg IV Lasix given here today.  Patient will take 80 mg by mouth this evening at 3 PM.  His potassium is 4.1 and has not on any replacement.  Patient will come back tomorrow for reevaluation at 8:30 AM.  If not improved, will admit to the hospital.  If feeling some better, will give IV diuresis here again.  Patient advised that if he begins to have any respiratory distress overnight, he has to go straight to the emergency room or call EMS.    Follow up in office tomorrow morning.     As always, it has been a pleasure to participate in this patient's care.      Sincerely,      JANET Pino

## 2018-05-01 ENCOUNTER — HOSPITAL ENCOUNTER (OUTPATIENT)
Dept: CARDIOLOGY | Facility: HOSPITAL | Age: 58
Discharge: HOME | End: 2018-05-01
Admitting: NURSE PRACTITIONER

## 2018-05-01 ENCOUNTER — OFFICE VISIT (OUTPATIENT)
Dept: CARDIOLOGY | Facility: CLINIC | Age: 58
End: 2018-05-01

## 2018-05-01 DIAGNOSIS — I50.43 ACUTE ON CHRONIC COMBINED SYSTOLIC AND DIASTOLIC CONGESTIVE HEART FAILURE (HCC): Primary | ICD-10-CM

## 2018-05-01 DIAGNOSIS — I42.0 DILATED CARDIOMYOPATHY (HCC): Primary | ICD-10-CM

## 2018-05-01 LAB
ANION GAP SERPL CALCULATED.3IONS-SCNC: 14.3 MMOL/L
BUN BLD-MCNC: 18 MG/DL (ref 6–20)
BUN/CREAT SERPL: 11.3 (ref 7–25)
CALCIUM SPEC-SCNC: 8.8 MG/DL (ref 8.6–10.5)
CHLORIDE SERPL-SCNC: 99 MMOL/L (ref 98–107)
CO2 SERPL-SCNC: 26.7 MMOL/L (ref 22–29)
CREAT BLD-MCNC: 1.59 MG/DL (ref 0.76–1.27)
GFR SERPL CREATININE-BSD FRML MDRD: 55 ML/MIN/1.73
GLUCOSE BLD-MCNC: 108 MG/DL (ref 65–99)
POTASSIUM BLD-SCNC: 3.6 MMOL/L (ref 3.5–5.2)
SODIUM BLD-SCNC: 140 MMOL/L (ref 136–145)

## 2018-05-01 PROCEDURE — 80048 BASIC METABOLIC PNL TOTAL CA: CPT | Performed by: NURSE PRACTITIONER

## 2018-05-01 PROCEDURE — 25010000002 FUROSEMIDE PER 20 MG: Performed by: NURSE PRACTITIONER

## 2018-05-01 PROCEDURE — 96374 THER/PROPH/DIAG INJ IV PUSH: CPT | Performed by: NURSE PRACTITIONER

## 2018-05-01 PROCEDURE — 36415 COLL VENOUS BLD VENIPUNCTURE: CPT | Performed by: NURSE PRACTITIONER

## 2018-05-01 RX ORDER — FUROSEMIDE 10 MG/ML
60 INJECTION INTRAMUSCULAR; INTRAVENOUS ONCE
Status: COMPLETED | OUTPATIENT
Start: 2018-05-01 | End: 2018-05-01

## 2018-05-01 RX ORDER — POTASSIUM CHLORIDE 750 MG/1
10 TABLET, FILM COATED, EXTENDED RELEASE ORAL DAILY
Qty: 30 TABLET | Refills: 11 | Status: SHIPPED | OUTPATIENT
Start: 2018-05-01 | End: 2019-06-03 | Stop reason: SDUPTHER

## 2018-05-01 RX ADMIN — FUROSEMIDE 60 MG: 10 INJECTION, SOLUTION INTRAMUSCULAR; INTRAVENOUS at 08:50

## 2018-05-01 NOTE — PROGRESS NOTES
Pt feels much improved here today. Still with slight edema bilaterally. Cough improved. Will give 60 IV lasix today along with bmp. 60 mg PO BID until he sees Dr. Mast next week. Call if weight down 5-10lbs or increases. Call if cough returns. Color improved today.    Bmp reviewed. K 3.6. Will start pt on 10 meq KCL per day. Recheck bmp next week

## 2018-05-11 ENCOUNTER — HOSPITAL ENCOUNTER (OUTPATIENT)
Dept: CARDIOLOGY | Facility: HOSPITAL | Age: 58
Setting detail: RECURRING SERIES
Discharge: HOME | End: 2018-05-11

## 2018-05-11 ENCOUNTER — OFFICE VISIT (OUTPATIENT)
Dept: CARDIOLOGY | Facility: CLINIC | Age: 58
End: 2018-05-11

## 2018-05-11 VITALS
WEIGHT: 200.4 LBS | HEART RATE: 125 BPM | BODY MASS INDEX: 23.66 KG/M2 | SYSTOLIC BLOOD PRESSURE: 118 MMHG | DIASTOLIC BLOOD PRESSURE: 78 MMHG | HEIGHT: 77 IN

## 2018-05-11 DIAGNOSIS — I42.0 DILATED CARDIOMYOPATHY (HCC): ICD-10-CM

## 2018-05-11 DIAGNOSIS — I48.19 PERSISTENT ATRIAL FIBRILLATION (HCC): ICD-10-CM

## 2018-05-11 DIAGNOSIS — R06.02 SHORTNESS OF BREATH AT REST: Primary | ICD-10-CM

## 2018-05-11 DIAGNOSIS — I10 ESSENTIAL HYPERTENSION: ICD-10-CM

## 2018-05-11 DIAGNOSIS — I34.0 MODERATE MITRAL REGURGITATION: ICD-10-CM

## 2018-05-11 PROCEDURE — 36416 COLLJ CAPILLARY BLOOD SPEC: CPT

## 2018-05-11 PROCEDURE — 93000 ELECTROCARDIOGRAM COMPLETE: CPT | Performed by: INTERNAL MEDICINE

## 2018-05-11 PROCEDURE — 85610 PROTHROMBIN TIME: CPT

## 2018-05-11 PROCEDURE — 99214 OFFICE O/P EST MOD 30 MIN: CPT | Performed by: INTERNAL MEDICINE

## 2018-05-11 RX ORDER — METOPROLOL TARTRATE 100 MG/1
100 TABLET ORAL EVERY 12 HOURS SCHEDULED
Qty: 60 TABLET | Refills: 11 | Status: SHIPPED | OUTPATIENT
Start: 2018-05-11 | End: 2018-05-23 | Stop reason: HOSPADM

## 2018-05-11 NOTE — PROGRESS NOTES
Subjective:     Encounter Date:05/11/2018      Patient ID: Hernando Caro is a 57 y.o. male.    Chief Complaint:  Cardiomyopathy   This is a new problem. The current episode started more than 1 month ago. The problem occurs daily. The problem has been gradually improving. The treatment provided moderate relief.   Hypertension   This is a chronic problem. The current episode started more than 1 month ago. The problem is controlled. Associated symptoms include shortness of breath.   Atrial Fibrillation   Presents for follow-up visit. Symptoms include hypertension and shortness of breath. The symptoms have been stable. Past medical history includes atrial fibrillation.     57-year-old gentleman who presents today for reevaluation.  Patient was found to have atrial fibrillation with a rapid rate.  Caused a cardiomyopathy as well as significant mitral regurgitation.  He presents today for reevaluation doing somewhat better although not completely back to baseline.  His heart rate was 125 today.  He does have some shortness of breath and fatigue.  He was complaining about his insurance due to the fact that his company was based in [place] there was extra cost added because he lives in [place].  I told was anything I can do to assist I would happy to but unfortunately there is not much I can do about this.        Review of Systems   Respiratory: Positive for shortness of breath.    All other systems reviewed and are negative.        ECG 12 Lead  Date/Time: 5/11/2018 1:16 PM  Performed by: JANEEN HOOK  Authorized by: JANEEN HOOK   Comparison: compared with previous ECG from 4/30/2018  Similar to previous ECG  Rhythm: atrial fibrillation  Clinical impression: abnormal ECG               Objective:     Physical Exam   Constitutional: He is oriented to person, place, and time. He appears well-developed.   HENT:   Head: Normocephalic.   Eyes: Conjunctivae are normal.   Neck: Normal range of motion. JVD  present.   Cardiovascular: Normal rate and normal heart sounds.  An irregularly irregular rhythm present.   Pulmonary/Chest: Breath sounds normal. He has no rales.   Abdominal: Soft. Bowel sounds are normal.   Musculoskeletal: Normal range of motion. He exhibits no edema.   Neurological: He is alert and oriented to person, place, and time.   Skin: Skin is warm and dry.   Psychiatric: He has a normal mood and affect. His behavior is normal.   Vitals reviewed.      Lab Review:       Assessment:          Diagnosis Plan   1. Shortness of breath at rest  Ambulatory Referral to Pulmonology   2. Moderate mitral regurgitation     3. Dilated cardiomyopathy     4. Essential hypertension     5. Persistent atrial fibrillation            Plan:       1.  Cardiomyopathy.  Patient's symptoms are improving.  Still some shortness of breath but better.  2.  Atrial fibrillation with a rapid rate.  Patient continues to be fast.  I increased his metoprolol to 100 twice a day.  He was concerned about the cost of medication so I changed him over to the tartrate because he said he was concerned about the cost of his medications.  3.  Hypertension blood pressures actually good  4.  Mitral regurgitation.  I still hope this improves as his cardiomyopathy also improves.  5.  Patient was also wearing oxygen at nighttime  He said last night he went without his oxygen he wasn't sure he needed it and that he slept better without it and it caused his nose to run.   6.  Follow-up 4 weeks.    Atrial Fibrillation and Atrial Flutter  Assessment  • The patient has persistent atrial fibrillation  • This is valvular in etiology  • The patient's CHADS2-VASc score is 3  • A [id number] score of 2 or more is considered a high risk for a thromboembolic event  • Warfarin prescribed    Plan  • Continue in atrial fibrillation with rate control  • Continue warfarin for antithrombotic therapy, bleeding issues discussed  • Continue beta blocker for rate control       Heart Failure  Assessment  • NYHA class III-B - There is significant limitation of physical activity. The patient is comfortable at rest, but minimal activity causes fatigue, palpitations or shortness of breath.  • The patient was newly diagnosed with heart failure within the past 12 months  • Left ventricular function is moderately reduced by qualitative assessment    Plan  • The heart failure care plan was discussed with the patient today including: up-titrating HF medications    Subjective/Objective  • The patient reports dyspnea  • Physical exam findings positive for peripheral edema and elevated JVP.   • Physical exam findings negative for rales.

## 2018-05-18 ENCOUNTER — DOCUMENTATION (OUTPATIENT)
Dept: CARDIOLOGY | Facility: CLINIC | Age: 58
End: 2018-05-18

## 2018-05-18 NOTE — PROGRESS NOTES
I got a call from the answering service stating that the patient was complaining of worsening shortness of breath. I tried to call twice, it went straight to voicemail. I left a message for him to call back.

## 2018-05-19 ENCOUNTER — HOSPITAL ENCOUNTER (INPATIENT)
Facility: HOSPITAL | Age: 58
LOS: 4 days | Discharge: HOME | End: 2018-05-23
Attending: EMERGENCY MEDICINE | Admitting: INTERNAL MEDICINE

## 2018-05-19 ENCOUNTER — APPOINTMENT (OUTPATIENT)
Dept: CARDIOLOGY | Facility: HOSPITAL | Age: 58
End: 2018-05-19
Attending: INTERNAL MEDICINE

## 2018-05-19 ENCOUNTER — APPOINTMENT (OUTPATIENT)
Dept: CT IMAGING | Facility: HOSPITAL | Age: 58
End: 2018-05-19

## 2018-05-19 ENCOUNTER — APPOINTMENT (OUTPATIENT)
Dept: GENERAL RADIOLOGY | Facility: HOSPITAL | Age: 58
End: 2018-05-19

## 2018-05-19 DIAGNOSIS — R00.0 TACHYCARDIA: ICD-10-CM

## 2018-05-19 DIAGNOSIS — I50.43 ACUTE ON CHRONIC COMBINED SYSTOLIC AND DIASTOLIC CONGESTIVE HEART FAILURE (HCC): Primary | ICD-10-CM

## 2018-05-19 DIAGNOSIS — J90 PLEURAL EFFUSION ON RIGHT: ICD-10-CM

## 2018-05-19 LAB
ALBUMIN SERPL-MCNC: 3.6 G/DL (ref 3.5–5.2)
ALBUMIN/GLOB SERPL: 1 G/DL
ALP SERPL-CCNC: 180 U/L (ref 39–117)
ALT SERPL W P-5'-P-CCNC: 29 U/L (ref 1–41)
ANION GAP SERPL CALCULATED.3IONS-SCNC: 14.6 MMOL/L
AST SERPL-CCNC: 35 U/L (ref 1–40)
BASOPHILS # BLD AUTO: 0.03 10*3/MM3 (ref 0–0.2)
BASOPHILS NFR BLD AUTO: 0.6 % (ref 0–1.5)
BH CV ECHO MEAS - ACS: 2 CM
BH CV ECHO MEAS - AI DEC SLOPE: 269 CM/SEC^2
BH CV ECHO MEAS - AI MAX PG: 44.9 MMHG
BH CV ECHO MEAS - AI MAX VEL: 335 CM/SEC
BH CV ECHO MEAS - AI P1/2T: 364.8 MSEC
BH CV ECHO MEAS - AO MAX PG (FULL): 3.7 MMHG
BH CV ECHO MEAS - AO MAX PG: 5.9 MMHG
BH CV ECHO MEAS - AO MEAN PG (FULL): 2 MMHG
BH CV ECHO MEAS - AO MEAN PG: 3 MMHG
BH CV ECHO MEAS - AO ROOT AREA (BSA CORRECTED): 1.9
BH CV ECHO MEAS - AO ROOT AREA: 13.2 CM^2
BH CV ECHO MEAS - AO ROOT DIAM: 4.1 CM
BH CV ECHO MEAS - AO V2 MAX: 121 CM/SEC
BH CV ECHO MEAS - AO V2 MEAN: 80.7 CM/SEC
BH CV ECHO MEAS - AO V2 VTI: 23.2 CM
BH CV ECHO MEAS - ASC AORTA: 4.1 CM
BH CV ECHO MEAS - AVA(I,A): 1.6 CM^2
BH CV ECHO MEAS - AVA(I,D): 1.6 CM^2
BH CV ECHO MEAS - AVA(V,A): 2.1 CM^2
BH CV ECHO MEAS - AVA(V,D): 2.1 CM^2
BH CV ECHO MEAS - BSA(HAYCOCK): 2.2 M^2
BH CV ECHO MEAS - BSA: 2.2 M^2
BH CV ECHO MEAS - BZI_BMI: 25.4 KILOGRAMS/M^2
BH CV ECHO MEAS - BZI_METRIC_HEIGHT: 190.5 CM
BH CV ECHO MEAS - BZI_METRIC_WEIGHT: 92.1 KG
BH CV ECHO MEAS - CONTRAST EF (2CH): 16.6 ML/M^2
BH CV ECHO MEAS - CONTRAST EF 4CH: 22 ML/M^2
BH CV ECHO MEAS - EDV(CUBED): 166.4 ML
BH CV ECHO MEAS - EDV(MOD-SP2): 229 ML
BH CV ECHO MEAS - EDV(MOD-SP4): 173 ML
BH CV ECHO MEAS - EDV(TEICH): 147.4 ML
BH CV ECHO MEAS - EF(CUBED): 33.5 %
BH CV ECHO MEAS - EF(MOD-BP): 30.4 %
BH CV ECHO MEAS - EF(MOD-SP2): 16.6 %
BH CV ECHO MEAS - EF(MOD-SP4): 22 %
BH CV ECHO MEAS - EF(TEICH): 27.1 %
BH CV ECHO MEAS - ESV(CUBED): 110.6 ML
BH CV ECHO MEAS - ESV(MOD-SP2): 191 ML
BH CV ECHO MEAS - ESV(MOD-SP4): 135 ML
BH CV ECHO MEAS - ESV(TEICH): 107.5 ML
BH CV ECHO MEAS - FS: 12.7 %
BH CV ECHO MEAS - IVS/LVPW: 1
BH CV ECHO MEAS - IVSD: 1.1 CM
BH CV ECHO MEAS - LAT PEAK E' VEL: 12 CM/SEC
BH CV ECHO MEAS - LV DIASTOLIC VOL/BSA (35-75): 78.3 ML/M^2
BH CV ECHO MEAS - LV MASS(C)D: 242 GRAMS
BH CV ECHO MEAS - LV MASS(C)DI: 109.6 GRAMS/M^2
BH CV ECHO MEAS - LV MAX PG: 2.1 MMHG
BH CV ECHO MEAS - LV MEAN PG: 1 MMHG
BH CV ECHO MEAS - LV SYSTOLIC VOL/BSA (12-30): 61.1 ML/M^2
BH CV ECHO MEAS - LV V1 MAX: 73.3 CM/SEC
BH CV ECHO MEAS - LV V1 MEAN: 45.5 CM/SEC
BH CV ECHO MEAS - LV V1 VTI: 11 CM
BH CV ECHO MEAS - LVIDD: 5.5 CM
BH CV ECHO MEAS - LVIDS: 4.8 CM
BH CV ECHO MEAS - LVLD AP2: 9.2 CM
BH CV ECHO MEAS - LVLD AP4: 8.4 CM
BH CV ECHO MEAS - LVLS AP2: 8.8 CM
BH CV ECHO MEAS - LVLS AP4: 7.9 CM
BH CV ECHO MEAS - LVOT AREA (M): 3.5 CM^2
BH CV ECHO MEAS - LVOT AREA: 3.5 CM^2
BH CV ECHO MEAS - LVOT DIAM: 2.1 CM
BH CV ECHO MEAS - LVPWD: 1.1 CM
BH CV ECHO MEAS - MED PEAK E' VEL: 5 CM/SEC
BH CV ECHO MEAS - MR MAX PG: 92.5 MMHG
BH CV ECHO MEAS - MR MAX VEL: 481 CM/SEC
BH CV ECHO MEAS - MR MEAN PG: 57 MMHG
BH CV ECHO MEAS - MR MEAN VEL: 347 CM/SEC
BH CV ECHO MEAS - MR VTI: 123 CM
BH CV ECHO MEAS - MV DEC SLOPE: 579 CM/SEC^2
BH CV ECHO MEAS - MV DEC TIME: 0.17 SEC
BH CV ECHO MEAS - MV E MAX VEL: 70.6 CM/SEC
BH CV ECHO MEAS - MV MAX PG: 3 MMHG
BH CV ECHO MEAS - MV MEAN PG: 1 MMHG
BH CV ECHO MEAS - MV P1/2T MAX VEL: 85.3 CM/SEC
BH CV ECHO MEAS - MV P1/2T: 43.1 MSEC
BH CV ECHO MEAS - MV V2 MAX: 86.2 CM/SEC
BH CV ECHO MEAS - MV V2 MEAN: 52.5 CM/SEC
BH CV ECHO MEAS - MV V2 VTI: 12.7 CM
BH CV ECHO MEAS - MVA P1/2T LCG: 2.6 CM^2
BH CV ECHO MEAS - MVA(P1/2T): 5.1 CM^2
BH CV ECHO MEAS - MVA(VTI): 3 CM^2
BH CV ECHO MEAS - PA ACC TIME: 0.09 SEC
BH CV ECHO MEAS - PA MAX PG (FULL): 2.5 MMHG
BH CV ECHO MEAS - PA MAX PG: 3.3 MMHG
BH CV ECHO MEAS - PA PR(ACCEL): 40.8 MMHG
BH CV ECHO MEAS - PA V2 MAX: 91.2 CM/SEC
BH CV ECHO MEAS - PI END-D VEL: 182 CM/SEC
BH CV ECHO MEAS - PVA(V,A): 2.7 CM^2
BH CV ECHO MEAS - PVA(V,D): 2.7 CM^2
BH CV ECHO MEAS - QP/QS: 0.97
BH CV ECHO MEAS - RAP SYSTOLE: 15 MMHG
BH CV ECHO MEAS - RV MAX PG: 0.84 MMHG
BH CV ECHO MEAS - RV MEAN PG: 0 MMHG
BH CV ECHO MEAS - RV V1 MAX: 45.8 CM/SEC
BH CV ECHO MEAS - RV V1 MEAN: 26.6 CM/SEC
BH CV ECHO MEAS - RV V1 VTI: 7 CM
BH CV ECHO MEAS - RVOT AREA: 5.3 CM^2
BH CV ECHO MEAS - RVOT DIAM: 2.6 CM
BH CV ECHO MEAS - RVSP: 45.9 MMHG
BH CV ECHO MEAS - SI(AO): 138.7 ML/M^2
BH CV ECHO MEAS - SI(CUBED): 25.3 ML/M^2
BH CV ECHO MEAS - SI(LVOT): 17.3 ML/M^2
BH CV ECHO MEAS - SI(MOD-SP2): 17.2 ML/M^2
BH CV ECHO MEAS - SI(MOD-SP4): 17.2 ML/M^2
BH CV ECHO MEAS - SI(TEICH): 18.1 ML/M^2
BH CV ECHO MEAS - SV(AO): 306.3 ML
BH CV ECHO MEAS - SV(CUBED): 55.8 ML
BH CV ECHO MEAS - SV(LVOT): 38.1 ML
BH CV ECHO MEAS - SV(MOD-SP2): 38 ML
BH CV ECHO MEAS - SV(MOD-SP4): 38 ML
BH CV ECHO MEAS - SV(RVOT): 37.1 ML
BH CV ECHO MEAS - SV(TEICH): 39.9 ML
BH CV ECHO MEAS - TAPSE (>1.6): 1.2 CM2
BH CV ECHO MEAS - TR MAX VEL: 278 CM/SEC
BH CV ECHO MEASUREMENTS AVERAGE E/E' RATIO: 8.31
BH CV VAS BP RIGHT ARM: NORMAL MMHG
BH CV XLRA - RV BASE: 4.5 CM
BH CV XLRA - TDI S': 11 CM/SEC
BILIRUB SERPL-MCNC: 2.3 MG/DL (ref 0.1–1.2)
BUN BLD-MCNC: 19 MG/DL (ref 6–20)
BUN/CREAT SERPL: 11.9 (ref 7–25)
CALCIUM SPEC-SCNC: 9.5 MG/DL (ref 8.6–10.5)
CHLORIDE SERPL-SCNC: 93 MMOL/L (ref 98–107)
CO2 SERPL-SCNC: 22.4 MMOL/L (ref 22–29)
CREAT BLD-MCNC: 1.6 MG/DL (ref 0.76–1.27)
D DIMER PPP FEU-MCNC: 3.53 MCGFEU/ML (ref 0–0.49)
DEPRECATED RDW RBC AUTO: 45.5 FL (ref 37–54)
DIGOXIN SERPL-MCNC: <0.3 NG/ML (ref 0.6–1.2)
EOSINOPHIL # BLD AUTO: 0.16 10*3/MM3 (ref 0–0.7)
EOSINOPHIL NFR BLD AUTO: 3 % (ref 0.3–6.2)
ERYTHROCYTE [DISTWIDTH] IN BLOOD BY AUTOMATED COUNT: 14.5 % (ref 11.5–14.5)
GFR SERPL CREATININE-BSD FRML MDRD: 54 ML/MIN/1.73
GLOBULIN UR ELPH-MCNC: 3.7 GM/DL
GLUCOSE BLD-MCNC: 110 MG/DL (ref 65–99)
HCT VFR BLD AUTO: 38.6 % (ref 40.4–52.2)
HGB BLD-MCNC: 13.1 G/DL (ref 13.7–17.6)
IMM GRANULOCYTES # BLD: 0 10*3/MM3 (ref 0–0.03)
IMM GRANULOCYTES NFR BLD: 0 % (ref 0–0.5)
INR PPP: 2.41 (ref 0.9–1.1)
LEFT ATRIUM VOLUME INDEX: 47.5 ML/M2
LYMPHOCYTES # BLD AUTO: 1.99 10*3/MM3 (ref 0.9–4.8)
LYMPHOCYTES NFR BLD AUTO: 37.8 % (ref 19.6–45.3)
MAXIMAL PREDICTED HEART RATE: 163 BPM
MCH RBC QN AUTO: 29.6 PG (ref 27–32.7)
MCHC RBC AUTO-ENTMCNC: 33.9 G/DL (ref 32.6–36.4)
MCV RBC AUTO: 87.1 FL (ref 79.8–96.2)
MONOCYTES # BLD AUTO: 0.66 10*3/MM3 (ref 0.2–1.2)
MONOCYTES NFR BLD AUTO: 12.5 % (ref 5–12)
MR PISA EROA: 0.38 CM2
NEUTROPHILS # BLD AUTO: 2.42 10*3/MM3 (ref 1.9–8.1)
NEUTROPHILS NFR BLD AUTO: 46.1 % (ref 42.7–76)
NT-PROBNP SERPL-MCNC: 5874 PG/ML (ref 5–900)
PISA ALIASING VEL: 38.5 M/S
PISA RADIUS: 0.8 CM
PLATELET # BLD AUTO: 297 10*3/MM3 (ref 140–500)
PMV BLD AUTO: 10.2 FL (ref 6–12)
POTASSIUM BLD-SCNC: 4 MMOL/L (ref 3.5–5.2)
PROT SERPL-MCNC: 7.3 G/DL (ref 6–8.5)
PROTHROMBIN TIME: 25.8 SECONDS (ref 11.7–14.2)
RBC # BLD AUTO: 4.43 10*6/MM3 (ref 4.6–6)
SODIUM BLD-SCNC: 130 MMOL/L (ref 136–145)
STRESS TARGET HR: 139 BPM
TROPONIN T SERPL-MCNC: 0.05 NG/ML (ref 0–0.03)
WBC NRBC COR # BLD: 5.26 10*3/MM3 (ref 4.5–10.7)

## 2018-05-19 PROCEDURE — 94799 UNLISTED PULMONARY SVC/PX: CPT

## 2018-05-19 PROCEDURE — 25010000002 FUROSEMIDE PER 20 MG: Performed by: EMERGENCY MEDICINE

## 2018-05-19 PROCEDURE — 83880 ASSAY OF NATRIURETIC PEPTIDE: CPT | Performed by: EMERGENCY MEDICINE

## 2018-05-19 PROCEDURE — 25010000002 DOBUTAMINE PER 250 MG: Performed by: INTERNAL MEDICINE

## 2018-05-19 PROCEDURE — 93306 TTE W/DOPPLER COMPLETE: CPT

## 2018-05-19 PROCEDURE — 99284 EMERGENCY DEPT VISIT MOD MDM: CPT

## 2018-05-19 PROCEDURE — 25010000002 DIGOXIN PER 500 MCG: Performed by: EMERGENCY MEDICINE

## 2018-05-19 PROCEDURE — 99222 1ST HOSP IP/OBS MODERATE 55: CPT | Performed by: INTERNAL MEDICINE

## 2018-05-19 PROCEDURE — 71045 X-RAY EXAM CHEST 1 VIEW: CPT

## 2018-05-19 PROCEDURE — 85379 FIBRIN DEGRADATION QUANT: CPT | Performed by: EMERGENCY MEDICINE

## 2018-05-19 PROCEDURE — 85025 COMPLETE CBC W/AUTO DIFF WBC: CPT | Performed by: EMERGENCY MEDICINE

## 2018-05-19 PROCEDURE — 25010000002 PERFLUTREN (DEFINITY) 8.476 MG IN SODIUM CHLORIDE 0.9 % 10 ML INJECTION: Performed by: INTERNAL MEDICINE

## 2018-05-19 PROCEDURE — 25010000002 FUROSEMIDE PER 20 MG: Performed by: INTERNAL MEDICINE

## 2018-05-19 PROCEDURE — 94640 AIRWAY INHALATION TREATMENT: CPT

## 2018-05-19 PROCEDURE — 71275 CT ANGIOGRAPHY CHEST: CPT

## 2018-05-19 PROCEDURE — 93306 TTE W/DOPPLER COMPLETE: CPT | Performed by: INTERNAL MEDICINE

## 2018-05-19 PROCEDURE — 25010000002 ENOXAPARIN PER 10 MG: Performed by: INTERNAL MEDICINE

## 2018-05-19 PROCEDURE — 84484 ASSAY OF TROPONIN QUANT: CPT | Performed by: EMERGENCY MEDICINE

## 2018-05-19 PROCEDURE — 93005 ELECTROCARDIOGRAM TRACING: CPT | Performed by: EMERGENCY MEDICINE

## 2018-05-19 PROCEDURE — 80162 ASSAY OF DIGOXIN TOTAL: CPT | Performed by: EMERGENCY MEDICINE

## 2018-05-19 PROCEDURE — 80053 COMPREHEN METABOLIC PANEL: CPT | Performed by: EMERGENCY MEDICINE

## 2018-05-19 PROCEDURE — 0 IOPAMIDOL PER 1 ML: Performed by: EMERGENCY MEDICINE

## 2018-05-19 PROCEDURE — 93010 ELECTROCARDIOGRAM REPORT: CPT | Performed by: INTERNAL MEDICINE

## 2018-05-19 PROCEDURE — 85610 PROTHROMBIN TIME: CPT | Performed by: EMERGENCY MEDICINE

## 2018-05-19 RX ORDER — SODIUM CHLORIDE 0.9 % (FLUSH) 0.9 %
1-10 SYRINGE (ML) INJECTION AS NEEDED
Status: DISCONTINUED | OUTPATIENT
Start: 2018-05-19 | End: 2018-05-23 | Stop reason: HOSPADM

## 2018-05-19 RX ORDER — METOPROLOL TARTRATE 100 MG/1
100 TABLET ORAL EVERY 12 HOURS SCHEDULED
Status: DISCONTINUED | OUTPATIENT
Start: 2018-05-19 | End: 2018-05-21

## 2018-05-19 RX ORDER — DIGOXIN 125 MCG
125 TABLET ORAL
Status: DISCONTINUED | OUTPATIENT
Start: 2018-05-19 | End: 2018-05-22

## 2018-05-19 RX ORDER — SODIUM CHLORIDE 0.9 % (FLUSH) 0.9 %
10 SYRINGE (ML) INJECTION AS NEEDED
Status: DISCONTINUED | OUTPATIENT
Start: 2018-05-19 | End: 2018-05-23 | Stop reason: HOSPADM

## 2018-05-19 RX ORDER — GUAIFENESIN 600 MG/1
600 TABLET, EXTENDED RELEASE ORAL EVERY 12 HOURS SCHEDULED
Status: DISCONTINUED | OUTPATIENT
Start: 2018-05-19 | End: 2018-05-21

## 2018-05-19 RX ORDER — DOBUTAMINE HYDROCHLORIDE 100 MG/100ML
2.5 INJECTION INTRAVENOUS
Status: DISCONTINUED | OUTPATIENT
Start: 2018-05-19 | End: 2018-05-21

## 2018-05-19 RX ORDER — POTASSIUM CHLORIDE 750 MG/1
20 CAPSULE, EXTENDED RELEASE ORAL 2 TIMES DAILY WITH MEALS
Status: DISCONTINUED | OUTPATIENT
Start: 2018-05-19 | End: 2018-05-21

## 2018-05-19 RX ORDER — WARFARIN SODIUM 5 MG/1
5 TABLET ORAL
Status: DISCONTINUED | OUTPATIENT
Start: 2018-05-19 | End: 2018-05-23 | Stop reason: HOSPADM

## 2018-05-19 RX ORDER — ATORVASTATIN CALCIUM 20 MG/1
40 TABLET, FILM COATED ORAL NIGHTLY
Status: DISCONTINUED | OUTPATIENT
Start: 2018-05-19 | End: 2018-05-20

## 2018-05-19 RX ORDER — PANTOPRAZOLE SODIUM 40 MG/1
40 TABLET, DELAYED RELEASE ORAL
Status: DISCONTINUED | OUTPATIENT
Start: 2018-05-19 | End: 2018-05-23 | Stop reason: HOSPADM

## 2018-05-19 RX ORDER — IPRATROPIUM BROMIDE AND ALBUTEROL SULFATE 2.5; .5 MG/3ML; MG/3ML
3 SOLUTION RESPIRATORY (INHALATION)
Status: DISCONTINUED | OUTPATIENT
Start: 2018-05-19 | End: 2018-05-23 | Stop reason: HOSPADM

## 2018-05-19 RX ORDER — PANTOPRAZOLE SODIUM 40 MG/1
40 TABLET, DELAYED RELEASE ORAL
Status: DISCONTINUED | OUTPATIENT
Start: 2018-05-20 | End: 2018-05-19

## 2018-05-19 RX ORDER — SPIRONOLACTONE 25 MG/1
12.5 TABLET ORAL DAILY
Status: DISCONTINUED | OUTPATIENT
Start: 2018-05-19 | End: 2018-05-22

## 2018-05-19 RX ORDER — FUROSEMIDE 10 MG/ML
40 INJECTION INTRAMUSCULAR; INTRAVENOUS 3 TIMES DAILY
Status: DISCONTINUED | OUTPATIENT
Start: 2018-05-19 | End: 2018-05-22

## 2018-05-19 RX ORDER — NITROGLYCERIN 0.4 MG/1
0.4 TABLET SUBLINGUAL
Status: DISCONTINUED | OUTPATIENT
Start: 2018-05-19 | End: 2018-05-23 | Stop reason: HOSPADM

## 2018-05-19 RX ORDER — FUROSEMIDE 10 MG/ML
60 INJECTION INTRAMUSCULAR; INTRAVENOUS ONCE
Status: COMPLETED | OUTPATIENT
Start: 2018-05-19 | End: 2018-05-19

## 2018-05-19 RX ORDER — DIGOXIN 0.25 MG/ML
250 INJECTION INTRAMUSCULAR; INTRAVENOUS ONCE
Status: COMPLETED | OUTPATIENT
Start: 2018-05-19 | End: 2018-05-19

## 2018-05-19 RX ADMIN — FUROSEMIDE 40 MG: 10 INJECTION, SOLUTION INTRAMUSCULAR; INTRAVENOUS at 17:46

## 2018-05-19 RX ADMIN — POTASSIUM CHLORIDE 20 MEQ: 750 CAPSULE, EXTENDED RELEASE ORAL at 17:46

## 2018-05-19 RX ADMIN — GUAIFENESIN 600 MG: 600 TABLET, EXTENDED RELEASE ORAL at 21:40

## 2018-05-19 RX ADMIN — SPIRONOLACTONE 12.5 MG: 25 TABLET ORAL at 13:03

## 2018-05-19 RX ADMIN — DIGOXIN 125 MCG: 0.12 TABLET ORAL at 14:06

## 2018-05-19 RX ADMIN — WARFARIN SODIUM 5 MG: 5 TABLET ORAL at 17:46

## 2018-05-19 RX ADMIN — PANTOPRAZOLE SODIUM 40 MG: 40 TABLET, DELAYED RELEASE ORAL at 18:23

## 2018-05-19 RX ADMIN — METOPROLOL TARTRATE 100 MG: 100 TABLET, FILM COATED ORAL at 13:03

## 2018-05-19 RX ADMIN — FUROSEMIDE 40 MG: 10 INJECTION, SOLUTION INTRAMUSCULAR; INTRAVENOUS at 21:40

## 2018-05-19 RX ADMIN — IOPAMIDOL 95 ML: 755 INJECTION, SOLUTION INTRAVENOUS at 08:51

## 2018-05-19 RX ADMIN — FUROSEMIDE 60 MG: 10 INJECTION, SOLUTION INTRAMUSCULAR; INTRAVENOUS at 09:47

## 2018-05-19 RX ADMIN — DIGOXIN 250 MCG: 0.25 INJECTION INTRAMUSCULAR; INTRAVENOUS at 09:14

## 2018-05-19 RX ADMIN — IPRATROPIUM BROMIDE AND ALBUTEROL SULFATE 3 ML: .5; 3 SOLUTION RESPIRATORY (INHALATION) at 23:57

## 2018-05-19 RX ADMIN — DOBUTAMINE IN DEXTROSE 2.5 MCG/KG/MIN: 100 INJECTION, SOLUTION INTRAVENOUS at 13:03

## 2018-05-19 RX ADMIN — PERFLUTREN 2 ML: 6.52 INJECTION, SUSPENSION INTRAVENOUS at 16:00

## 2018-05-19 RX ADMIN — POTASSIUM CHLORIDE 20 MEQ: 750 CAPSULE, EXTENDED RELEASE ORAL at 13:03

## 2018-05-19 RX ADMIN — IPRATROPIUM BROMIDE AND ALBUTEROL SULFATE 3 ML: .5; 3 SOLUTION RESPIRATORY (INHALATION) at 20:01

## 2018-05-19 RX ADMIN — ATORVASTATIN CALCIUM 40 MG: 20 TABLET, FILM COATED ORAL at 21:40

## 2018-05-19 RX ADMIN — ENOXAPARIN SODIUM 40 MG: 40 INJECTION SUBCUTANEOUS at 13:03

## 2018-05-19 RX ADMIN — FUROSEMIDE 40 MG: 10 INJECTION, SOLUTION INTRAMUSCULAR; INTRAVENOUS at 13:03

## 2018-05-19 NOTE — H&P
Patient Care Team:  Jerald Sebastian MD as PCP - General (Internal Medicine)  Jerald Sebastian MD as PCP - Family Medicine  Jerald Sebastian MD    Chief complaint shortness of breath    Subjective     Patient is a 57 y.o. male presents with increasing shortness of breath. Onset of symptoms was gradual starting 6 weeks ago.  Symptoms are associated with weakness and tiredness.  Symptoms are aggravated by minimum exercise.   Symptoms improve with none. Severity moderate to severe Context at rest and on exercise Quality moderate shortness of breath    Review of Systems   All systems were reviewed and negative except for:  Constitution:  positive for fatigue  Respiratory: positive for  shortness of air    History  Past Medical History:   Diagnosis Date   • Bronchitis     2018   • Cardiomyopathy    • Chronic systolic CHF (congestive heart failure)    • CKD (chronic kidney disease)    • Ejection fraction < 50%    • Essential hypertension    • Mild CAD     Non-obstructive   • Mitral regurgitation    • Persistent atrial fibrillation     flutter   • SOB (shortness of breath)      Past Surgical History:   Procedure Laterality Date   • BACK SURGERY     • CARDIAC CATHETERIZATION N/A 4/3/2018    Procedure: Coronary angiography;  Surgeon: Geovany Guadalupe MD;  Location: [facility] INVASIVE LOCATION;  Service: Cardiovascular   • CARDIAC CATHETERIZATION N/A 4/3/2018    Procedure: Left heart cath;  Surgeon: Geovany Guadalupe MD;  Location: [facility] INVASIVE LOCATION;  Service: Cardiovascular   • CARDIAC CATHETERIZATION N/A 4/3/2018    Procedure: Left ventriculography;  Surgeon: Geovany Guadalupe MD;  Location: [facility] CATH INVASIVE LOCATION;  Service: Cardiovascular   • CARDIAC CATHETERIZATION N/A 4/3/2018    Procedure: Right heart cath;  Surgeon: Geovany Guadalupe MD;  Location: [facility] CATH INVASIVE LOCATION;  Service: Cardiovascular     Family History   Problem Relation Age of Onset   • Congenital heart  disease Brother    • Hypertension Mother    • Thyroid disease Mother    • Lung disease Father    • No Known Problems Maternal Grandmother    • No Known Problems Maternal Grandfather    • No Known Problems Paternal Grandmother    • No Known Problems Paternal Grandfather    • Allergic rhinitis Sister    • Hypertension Brother    • No Known Problems Brother    • No Known Problems Brother      Social History   Substance Use Topics   • Smoking status: Former Smoker     Packs/day: 0.00     Years: 0.00     Quit date: 4/6/2018   • Smokeless tobacco: Never Used      Comment: caffeine 1 cup day   • Alcohol use Yes      Comment: 3 beers nightly       (Not in a hospital admission)  Allergies:  Ace inhibitors and Lisinopril    Objective     Vital Signs  Temp:  [96.4 °F (35.8 °C)] 96.4 °F (35.8 °C)  Heart Rate:  [] 122  Resp:  [20] 20  BP: (118-134)/() 130/104    Physical Exam:      General Appearance:    Alert, cooperative, in no acute distress   Head:    Normocephalic, without obvious abnormality, atraumatic   Eyes:            Lids and lashes normal, conjunctivae and sclerae normal, no   icterus, no pallor, corneas clear, PERRLA   Ears:    Ears appear intact with no abnormalities noted   Throat:   No oral lesions, no thrush, oral mucosa moist   Neck:   No adenopathy, supple, trachea midline, no thyromegaly, no   carotid bruit, no JVD   Back:     No kyphosis present, no scoliosis present, no skin lesions,      erythema or scars, no tenderness to percussion or                   palpation,   range of motion normal   Lungs:    Distance breath sounds with fine crepitations     Heart:    Regular rhythm and normal rate, normal S1 and S2, no            murmur, no gallop, no rub, no click   Chest Wall:    No abnormalities observed   Abdomen:     Normal bowel sounds, no masses, no organomegaly, soft        non-tender, non-distended, no guarding, no rebound                tenderness   Rectal:     Deferred   Extremities:    Moves all extremities well, 2+ edema, no cyanosis, no             redness   Pulses:   Pulses palpable and equal bilaterally   Skin:   No bleeding, bruising or rash   Lymph nodes:   No palpable adenopathy   Neurologic:   Cranial nerves 2 - 12 grossly intact, sensation intact, DTR       present and equal bilaterally   EKG shows sinus tachycardia and diffuse ST-T wave changes    Echocardiogram severe LV dysfunction    Results Review:    I reviewed the patient's new clinical results.    Assessment/Plan     Active Problems:    Acute on chronic combined systolic and diastolic congestive heart failure      Nonischemic cardiomyopathy    Start on the Dobutrex as well as IV Lasix    Internal medicine consultation    Further workup and recommendations as needed    I discussed the patients findings and my recommendations with patient.     Waqar Choudhury MD  05/19/18  10:42 AM    Time:

## 2018-05-19 NOTE — ED PROVIDER NOTES
EMERGENCY DEPARTMENT ENCOUNTER    CHIEF COMPLAINT  Chief Complaint: SOA  History given by: pt  History limited by: N/A  Room Number: 11/11  PMD: Jerald Sebastian MD      HPI:  Pt is a 57 y.o. male who presents complaining of constant SOA that has been ongoing for the past 2 days. Pt states he has not been able to sleep in the past 2 days due to the SOA. Pt states that laying flat worsens his SOA and sitting upright helps to alleviate his SOA. Pt states his Lasix medication was recently increased from 80 mg to 100 mg; however, pt denies any reported relief. Pt also c/o cough, but denies CP, N/V, fever, chills, or any other pertinent symptoms. Pt has a hx of cardiomyopathy, mild CAD, CKD, and CHF. Pt is currently taking Coumadin.     Duration:  2 days  Onset: gradual  Timing: constant   Location: N/A  Radiation: N/A  Quality: SOA  Intensity/Severity: moderate  Progression: unchanged   Associated Symptoms: cough  Aggravating Factors: Laying flat  Alleviating Factors: Sitting upright   Previous Episodes: Pt has a hx of CHF.  Treatment before arrival: PT takes Coumadin daily and reports his LAsix medication recently being increased from 80 mg to 100 mg with no reported relief.     PAST MEDICAL HISTORY  Active Ambulatory Problems     Diagnosis Date Noted   • Essential hypertension    • ED (erectile dysfunction) 03/07/2018   • Moderate mitral regurgitation 03/26/2018   • Moderate tricuspid regurgitation 03/26/2018   • Combined systolic and diastolic congestive heart failure 03/26/2018   • Cardiomyopathy 03/30/2018   • New onset atrial flutter 04/03/2018   • Renal insufficiency 04/13/2018   • Hyponatremia 04/13/2018   • Persistent atrial fibrillation 05/11/2018     Resolved Ambulatory Problems     Diagnosis Date Noted   • No Resolved Ambulatory Problems     Past Medical History:   Diagnosis Date   • Bronchitis    • Cardiomyopathy    • Chronic systolic CHF (congestive heart failure)    • CKD (chronic kidney disease)    •  Ejection fraction < 50%    • Essential hypertension    • Mild CAD    • Mitral regurgitation    • Persistent atrial fibrillation    • SOB (shortness of breath)        PAST SURGICAL HISTORY  Past Surgical History:   Procedure Laterality Date   • BACK SURGERY     • CARDIAC CATHETERIZATION N/A 4/3/2018    Procedure: Coronary angiography;  Surgeon: Geovany Guadalupe MD;  Location:  [facility] INVASIVE [facility];  Service: Cardiovascular   • CARDIAC CATHETERIZATION N/A 4/3/2018    Procedure: Left heart cath;  Surgeon: Geovany Guadalupe MD;  Location: [facility] CATH INVASIVE LOCATION;  Service: Cardiovascular   • CARDIAC CATHETERIZATION N/A 4/3/2018    Procedure: Left ventriculography;  Surgeon: Geovany Guadalupe MD;  Location:  [facility]ATH INVASIVE [facility];  Service: Cardiovascular   • CARDIAC CATHETERIZATION N/A 4/3/2018    Procedure: Right heart cath;  Surgeon: Geovany Guadalupe MD;  Location: [facility] CATH INVASIVE LOCATION;  Service: Cardiovascular       FAMILY HISTORY  Family History   Problem Relation Age of Onset   • Congenital heart disease Brother    • Hypertension Mother    • Thyroid disease Mother    • Lung disease Father    • No Known Problems Maternal Grandmother    • No Known Problems Maternal Grandfather    • No Known Problems Paternal Grandmother    • No Known Problems Paternal Grandfather    • Allergic rhinitis Sister    • Hypertension Brother    • No Known Problems Brother    • No Known Problems Brother        SOCIAL HISTORY  Social History     Social History   • Marital status: Single     Spouse name: N/A   • Number of children: N/A   • Years of education: N/A     Occupational History   • Not on file.     Social History Main Topics   • Smoking status: Former Smoker     Packs/day: 0.00     Years: 0.00     Quit date: 4/6/2018   • Smokeless tobacco: Never Used      Comment: caffeine 1 cup day   • Alcohol use Yes      Comment: 3 beers nightly   • Drug use: No   • Sexual activity: Yes     Other  Topics Concern   • Not on file     Social History Narrative    ** Merged History Encounter **            ALLERGIES  Ace inhibitors and Lisinopril    REVIEW OF SYSTEMS  Review of Systems   Constitutional: Negative.  Negative for chills and fever.   HENT: Negative.  Negative for sore throat.    Eyes: Negative.    Respiratory: Positive for cough and shortness of breath.    Cardiovascular: Negative.  Negative for chest pain.   Gastrointestinal: Negative.  Negative for nausea and vomiting.   Genitourinary: Negative.  Negative for dysuria.   Musculoskeletal: Negative.  Negative for back pain.   Skin: Negative.  Negative for rash.   Neurological: Negative.  Negative for headaches.   All other systems reviewed and are negative.      PHYSICAL EXAM  ED Triage Vitals [05/19/18 0738]   Temp Heart Rate Resp BP SpO2   96.4 °F (35.8 °C) 64 20 -- 99 %      Temp src Heart Rate Source Patient Position BP Location FiO2 (%)   Tympanic Monitor -- -- --       Physical Exam   Constitutional: He is oriented to person, place, and time and well-developed, well-nourished, and in no distress.   HENT:   Head: Normocephalic and atraumatic.   Eyes: EOM are normal. Pupils are equal, round, and reactive to light.   Neck: Normal range of motion. Neck supple. JVD (bilaterally, sitting upright ) present.   Cardiovascular: Regular rhythm, normal heart sounds and intact distal pulses.  Tachycardia present.    Pulmonary/Chest: Effort normal and breath sounds normal. No respiratory distress. He has no wheezes. He has no rales.   Decreased breath sounds bilaterally    Abdominal: Soft. There is no tenderness. There is no rebound and no guarding.   Musculoskeletal: Normal range of motion. He exhibits edema (moderate ).   Neurological: He is alert and oriented to person, place, and time. He has normal sensation and normal strength.   Skin: Skin is warm and dry.   Psychiatric: Mood and affect normal.   Nursing note and vitals reviewed.      LAB RESULTS  Lab  Results (last 24 hours)     Procedure Component Value Units Date/Time    CBC & Differential [[id number]] Collected:  05/19/18 0800    Specimen:  Blood Updated:  05/19/18 0814    Narrative:       The following orders were created for panel order CBC & Differential.  Procedure                               Abnormality         Status                     ---------                               -----------         ------                     CBC Auto Differential[[id number]]        Abnormal            Final result                 Please view results for these tests on the individual orders.    Comprehensive Metabolic Panel [[id number]] Collected:  05/19/18 0800    Specimen:  Blood Updated:  05/19/18 0807    Protime-INR [[id number]]  (Abnormal) Collected:  05/19/18 0800    Specimen:  Blood Updated:  05/19/18 0833     Protime 25.8 (H) Seconds      INR 2.41 (H)    BNP [[id number]] Collected:  05/19/18 0800    Specimen:  Blood Updated:  05/19/18 0807    D-dimer, Quantitative [[id number]]  (Abnormal) Collected:  05/19/18 0800    Specimen:  Blood Updated:  05/19/18 0833     D-Dimer, Quantitative 3.53 (H) MCGFEU/mL     Narrative:       The Stago D-Dimer test used in conjunction with a clinical pretest probability (PTP) assessment model, has been approved by the FDA to rule out the presence of venous thromboembolism (VTE) in outpatients suspected of deep venous thrombosis (DVT) or pulmonary embolism (PE).     Troponin [[id number]] Collected:  05/19/18 0800    Specimen:  Blood Updated:  05/19/18 0807    CBC Auto Differential [[id number]]  (Abnormal) Collected:  05/19/18 0800    Specimen:  Blood Updated:  05/19/18 0814     WBC 5.26 10*3/mm3      RBC 4.43 (L) 10*6/mm3      Hemoglobin 13.1 (L) g/dL      Hematocrit 38.6 (L) %      MCV 87.1 fL      MCH 29.6 pg      MCHC 33.9 g/dL      RDW 14.5 %      RDW-SD 45.5 fl      MPV 10.2 fL      Platelets 297 10*3/mm3      Neutrophil % 46.1 %      Lymphocyte % 37.8 %      Monocyte % 12.5 (H) %       Eosinophil % 3.0 %      Basophil % 0.6 %      Immature Grans % 0.0 %      Neutrophils, Absolute 2.42 10*3/mm3      Lymphocytes, Absolute 1.99 10*3/mm3      Monocytes, Absolute 0.66 10*3/mm3      Eosinophils, Absolute 0.16 10*3/mm3      Basophils, Absolute 0.03 10*3/mm3      Immature Grans, Absolute 0.00 10*3/mm3     Digoxin Level [[id number]] Collected:  05/19/18 0800    Specimen:  Blood Updated:  05/19/18 0807          I ordered the above labs and reviewed the results    RADIOLOGY  XR Chest 1 View    (Results Pending)   Chest XR shows a small R pleural effusion with minimal enlargement from previous study performed on 4/2/18.     CT A Chest: Limited study, but no evidence for a central PE. Large R effusion observed.     I ordered the above noted radiological studies. Interpreted by radiologist. Reviewed by me in PACS.       PROCEDURES  Procedures  EKG           EKG time: 0754  Rhythm/Rate: sinus tachycardia at 120  P waves and OH: normal  QRS, axis: normal axis, LVH   ST and T waves: lateral ST changes      Interpreted Contemporaneously by me, independently viewed  A-fib has resolved compared to prior 4/6/18    PROGRESS AND CONSULTS     0748  Ordered IVF, labs, and Chest XR for further evaluation.     0835  Ordered CT A Chest for further evaluation.     0840  Ordered Lanoxin.     0927  Placed call to [facility].     0928  Ordered Lasix.     0939  Discussed pt's case with Dr. Choudhury [cardiology] who agrees to admit.     0940  Rechecked pt who is in no acute distress. Discussed lab/ radiology results with pt. Discussed cardiac consult and plan to admit pt. Pt understands and agrees to plan, all questions addressed at this time.   Reassessment: Pt denies any relief/ improvement from the medications administered in the ER. Pt's status is unchanged at this time.     MEDICAL DECISION MAKING  Results were reviewed/discussed with the patient and they were also made aware of online access. Pt also made aware that some labs,  such as cultures, will not be resulted during ER visit and follow up with PMD is necessary.     MDM  Number of Diagnoses or Management Options     Amount and/or Complexity of Data Reviewed  Clinical lab tests: ordered and reviewed (D-dimer = 3.53 (1 month ago D-dimer = 3.40) =, BNP = 5874 (2 weeks ago BNP = 4273), troponin = 0.052 (1 month ago troponin = 0.051), Creatinine = 1.60, Sodium = 130, Digoxin < 0.30)  Tests in the radiology section of CPT®: ordered and reviewed (Chest XR shows a small R pleural effusion with minimal enlargement from previous study performed on 4/2/18.      CT A Chest: Limited study, but no evidence for a central PE. Large R effusion observed. )  Tests in the medicine section of CPT®: ordered and reviewed (Refer to procedure )  Discussion of test results with the performing providers: yes ( Dr. Choudhury (cardiology) )  Decide to obtain previous medical records or to obtain history from someone other than the patient: yes  Independent visualization of images, tracings, or specimens: yes           DIAGNOSIS  Final diagnoses:   Acute on chronic combined systolic and diastolic congestive heart failure   Pleural effusion on right   Tachycardia       DISPOSITION  ADMISSION    Discussed treatment plan and reason for admission with pt/family and admitting physician.  Pt/family voiced understanding of the plan for admission for further testing/treatment as needed.     Latest Documented Vital Signs:  As of 8:33 AM  BP- 133/94 HR- (!) 133 Temp- 96.4 °F (35.8 °C) (Tympanic) O2 sat- 95%    --  Documentation assistance provided by darrel Mg for Dr. Paz.  Information recorded by the darrel was done at my direction and has been verified and validated by me.            Migel Mg  05/19/18 0385       Bebeto Paz MD  05/19/18 3979

## 2018-05-19 NOTE — CONSULTS
Internal medicine consult    Primary care physician  Dr. Sebastian    Referring physician  Dr. GALVAN    Reason for consult  Follow medical problem    Chief complaint  Shortness of breath    History of present illness  57-year-old -American male with history of cardiomyopathy congestive heart failure chronic kidney disease hypertension chronic atrial fibrillation admitted to cardiology service from ER with shortness of breath for last 2 days.  Patient denies any chest pain.  Patient does have nonproductive cough.  Patient evaluated in ER found to be in decompensated heart failure admitted for management.  I'm asked to follow the patient for medical problem.    PAST MEDICAL HISTORY     Bronchitis     • Cardiomyopathy     • Chronic systolic CHF (congestive heart failure)     • CKD (chronic kidney disease)     • Ejection fraction < 50%     • Essential hypertension     • Mild CAD     • Mitral regurgitation     • Persistent atrial fibrillation     • SOB (shortness of breath)        PAST SURGICAL HISTORY  Surgical History         Past Surgical History:   Procedure Laterality Date   • BACK SURGERY       • CARDIAC CATHETERIZATION N/A 4/3/2018     Procedure: Coronary angiography;  Surgeon: Geovany Guadalupe MD;  Location: [facility] CATH INVASIVE LOCATION;  Service: Cardiovascular   • CARDIAC CATHETERIZATION N/A 4/3/2018     Procedure: Left heart cath;  Surgeon: Geovany Guadalupe MD;  Location[facility];  Service: Cardiovascular   • CARDIAC CATHETERIZATION N/A 4/3/2018     Procedure: Left ventriculography;  Surgeon: Geovany Guadalupe MD;  Location:  [facility] CATH INVASIVE LOCATION;  Service: Cardiovascular   • CARDIAC CATHETERIZATION N/A 4/3/2018     Procedure: Right heart cath;  Surgeon: Geovany Guadalupe MD;  [facility];  Service: Cardiovascular         FAMILY HISTORY        Family History   Problem Relation Age of Onset   • Congenital heart disease Brother     •  "Hypertension Mother     • Thyroid disease Mother     • Lung disease Father     • No Known Problems Maternal Grandmother     • No Known Problems Maternal Grandfather     • No Known Problems Paternal Grandmother     • No Known Problems Paternal Grandfather     • Allergic rhinitis Sister     • Hypertension Brother     • No Known Problems Brother     • No Known Problems Brother        SOCIAL HISTORY  Social History   Social History            Social History   • Marital status: Single       Spouse name: N/A   • Number of children: N/A   • Years of education: N/A          Occupational History   • Not on file.             Social History Main Topics   • Smoking status: Former Smoker       Packs/day: 0.00       Years: 0.00       Quit date: 4/6/2018   • Smokeless tobacco: Never Used         Comment: caffeine 1 cup day   • Alcohol use Yes         Comment: 3 beers nightly   • Drug use: No   • Sexual activity: Yes           Other Topics Concern   • Not on file          Social History Narrative     ** Merged History Encounter **               ALLERGIES  Ace inhibitors and Lisinopril    Home medications reviewed     REVIEW OF SYSTEMS  Review of Systems   Constitutional: Negative.  Negative for chills and fever.   HENT: Negative.  Negative for sore throat.    Eyes: Negative.    Respiratory: Positive for cough and shortness of breath.    Cardiovascular: Negative.  Negative for chest pain.   Gastrointestinal: Negative.  Negative for nausea and vomiting.   Genitourinary: Negative.  Negative for dysuria.   Musculoskeletal: Negative.  Negative for back pain.   Skin: Negative.  Negative for rash.   Neurological: Negative.  Negative for headaches.   All other systems reviewed and are negative.     PHYSICAL EXAM  Blood pressure 120/89, pulse (!) 125, temperature 98.2 °F (36.8 °C), temperature source Oral, resp. rate 18, height 190.5 cm (75\"), weight 92.4 kg (203 lb 9.6 oz), SpO2 94 %.    Constitutional: He is oriented to person, place, and " time and well-developed, well-nourished, and in no distress.   Head: Normocephalic and atraumatic.   Eyes: EOM are normal. Pupils are equal, round, and reactive to light.   Neck: Normal range of motion. Neck supple. JVD (bilaterally, sitting upright ) present.   Cardiovascular: Regular rhythm, normal heart sounds and intact distal pulses.  Tachycardia present.    Pulmonary/Chest: Effort normal and breath sounds normal. No respiratory distress. He has no wheezes. He has no rales.   Decreased breath sounds bilaterally    Abdominal: Soft. There is no tenderness. There is no rebound and no guarding.   Musculoskeletal: Normal range of motion. He exhibits edema (moderate ).   Neurological: He is alert and oriented to person, place, and time. He has normal sensation and normal strength.   Skin: Skin is warm and dry.   Psychiatric: Mood and affect normal.     LAB RESULTS  Lab Results (last 24 hours)     Procedure Component Value Units Date/Time    Digoxin Level [[id number]]  (Abnormal) Collected:  05/19/18 0800    Specimen:  Blood Updated:  05/19/18 0839     Digoxin <0.30 (L) ng/mL     Troponin [[id number]]  (Abnormal) Collected:  05/19/18 0800    Specimen:  Blood Updated:  05/19/18 0838     Troponin T 0.052 (H) ng/mL     Narrative:       Troponin T Reference Ranges:  Less than 0.03 ng/mL:    Negative for AMI  0.03 to 0.09 ng/mL:      Indeterminant for AMI  Greater than 0.09 ng/mL: Positive for AMI    Comprehensive Metabolic Panel [[id number]]  (Abnormal) Collected:  05/19/18 0800    Specimen:  Blood Updated:  05/19/18 0836     Glucose 110 (H) mg/dL      BUN 19 mg/dL      Creatinine 1.60 (H) mg/dL      Sodium 130 (L) mmol/L      Potassium 4.0 mmol/L      Chloride 93 (L) mmol/L      CO2 22.4 mmol/L      Calcium 9.5 mg/dL      Total Protein 7.3 g/dL      Albumin 3.60 g/dL      ALT (SGPT) 29 U/L      AST (SGOT) 35 U/L      Alkaline Phosphatase 180 (H) U/L      Total Bilirubin 2.3 (H) mg/dL      eGFR   Amer 54 (L)  mL/min/1.73      Globulin 3.7 gm/dL      A/G Ratio 1.0 g/dL      BUN/Creatinine Ratio 11.9     Anion Gap 14.6 mmol/L     BNP [[id number]]  (Abnormal) Collected:  05/19/18 0800    Specimen:  Blood Updated:  05/19/18 0835     proBNP 5,874.0 (H) pg/mL     Narrative:       Among patients with dyspnea, NT-proBNP is highly sensitive for the detection of acute congestive heart failure. In addition NT-proBNP of <300 pg/ml effectively rules out acute congestive heart failure with 99% negative predictive value.    Protime-INR [[id number]]  (Abnormal) Collected:  05/19/18 0800    Specimen:  Blood Updated:  05/19/18 0833     Protime 25.8 (H) Seconds      INR 2.41 (H)    D-dimer, Quantitative [[id number]]  (Abnormal) Collected:  05/19/18 0800    Specimen:  Blood Updated:  05/19/18 0833     D-Dimer, Quantitative 3.53 (H) MCGFEU/mL     Narrative:       The Stago D-Dimer test used in conjunction with a clinical pretest probability (PTP) assessment model, has been approved by the FDA to rule out the presence of venous thromboembolism (VTE) in outpatients suspected of deep venous thrombosis (DVT) or pulmonary embolism (PE).     CBC & Differential [[id number]] Collected:  05/19/18 0800    Specimen:  Blood Updated:  05/19/18 0814    Narrative:       The following orders were created for panel order CBC & Differential.  Procedure                               Abnormality         Status                     ---------                               -----------         ------                     CBC Auto Differential[[id number]]        Abnormal            Final result                 Please view results for these tests on the individual orders.    CBC Auto Differential [[id number]]  (Abnormal) Collected:  05/19/18 0800    Specimen:  Blood Updated:  05/19/18 0814     WBC 5.26 10*3/mm3      RBC 4.43 (L) 10*6/mm3      Hemoglobin 13.1 (L) g/dL      Hematocrit 38.6 (L) %      MCV 87.1 fL      MCH 29.6 pg      MCHC 33.9 g/dL      RDW 14.5 %      RDW-SD  45.5 fl      MPV 10.2 fL      Platelets 297 10*3/mm3      Neutrophil % 46.1 %      Lymphocyte % 37.8 %      Monocyte % 12.5 (H) %      Eosinophil % 3.0 %      Basophil % 0.6 %      Immature Grans % 0.0 %      Neutrophils, Absolute 2.42 10*3/mm3      Lymphocytes, Absolute 1.99 10*3/mm3      Monocytes, Absolute 0.66 10*3/mm3      Eosinophils, Absolute 0.16 10*3/mm3      Basophils, Absolute 0.03 10*3/mm3      Immature Grans, Absolute 0.00 10*3/mm3         Imaging Results (last 24 hours)     Procedure Component Value Units Date/Time    CT Angiogram Chest With Contrast [[id number]] Collected:  05/19/18 0945     Updated:  05/19/18 1002    Narrative:       CT ANGIOGRAPHY OF THE CHEST WITH INTRAVENOUS CONTRAST AND 3D  RECONSTRUCTIONS     HISTORY: Shortness of breath. Cough. Elevated D-dimer.     The CT scan was performed as an emergency procedure with CT angiography  protocol using intravenous contrast and 3D reconstructions. The  following findings are present:     1. The examination is technically limited because of inability to  perform gating and the patient's inability to hold his breath. There is  no evidence of occlusive pulmonary embolus in the main pulmonary  arteries. The visualized secondary branches are also unremarkable but  there is some respiratory artifact present. The thoracic aorta shows  mild generalized ectasia without focal aneurysm.        2. There is a large right pleural effusion layering posteriorly with  some compressive atelectasis at the right base. The lungs are otherwise  clear. There is no mediastinal or hilar or axillary adenopathy. There is  no pericardial effusion.     The CT images through the upper liver, spleen, and partially visualized  adrenal glands are unremarkable.                 Radiation dose reduction techniques were utilized, including automated  exposure control and exposure modulation based on body size.     This report was finalized on 5/19/2018 9:59 AM by Dr. Sierra  TIFFANIE Arellano.       XR Chest 1 View [[id number]] Collected:  05/19/18 0835     Updated:  05/19/18 0858    Narrative:       ONE VIEW PORTABLE CHEST     HISTORY: Cough and shortness of breath.     FINDINGS: There is a small right pleural effusion as also noted on the  CT scan of the chest dated 04/02/2018. Allowing for difference in  technique, it is perhaps minimally larger since the chest x-ray  performed at that time. The lungs are otherwise clear and the heart  remains enlarged without overt CHF.     This report was finalized on 5/19/2018 8:55 AM by Dr. Rigo Arellano M.D.           EKG                              Rhythm/Rate: sinus tachycardia at 120  P waves and NE: normal  QRS, axis: normal axis, LVH   ST and T waves: lateral ST changes        Current Facility-Administered Medications:   •  atorvastatin (LIPITOR) tablet 40 mg, 40 mg, Oral, Nightly, Waqar Choudhury MD  •  digoxin (LANOXIN) tablet 125 mcg, 125 mcg, Oral, Daily, Waqar Choudhury MD, 125 mcg at 05/19/18 1406  •  DOBUTamine (DOBUTREX) 1 mg/mL infusion, 2.5 mcg/kg/min, Intravenous, Titrated, Waqar Choudhury MD, Last Rate: 13.74 mL/hr at 05/19/18 1303, 2.5 mcg/kg/min at 05/19/18 1303  •  furosemide (LASIX) injection 40 mg, 40 mg, Intravenous, TID, Waqar Choudhury MD, 40 mg at 05/19/18 1746  •  metoprolol tartrate (LOPRESSOR) tablet 100 mg, 100 mg, Oral, Q12H, Waqar Choudhury MD, 100 mg at 05/19/18 1303  •  nitroglycerin (NITROSTAT) SL tablet 0.4 mg, 0.4 mg, Sublingual, Q5 Min PRN, Waqar Choudhury MD  •  [START ON 5/20/2018] pantoprazole (PROTONIX) EC tablet 40 mg, 40 mg, Oral, Q AM, Kamran Barba MD  •  potassium chloride (MICRO-K) CR capsule 20 mEq, 20 mEq, Oral, BID With Meals, Waqar Choudhury MD, 20 mEq at 05/19/18 1746  •  sodium chloride 0.9 % flush 1-10 mL, 1-10 mL, Intravenous, PRN, Waqar Choudhury MD  •  Insert peripheral IV, , , Once **AND** sodium chloride 0.9 % flush 10 mL, 10 mL,  Intravenous, PRN, Bebeto Paz MD  •  spironolactone (ALDACTONE) tablet 12.5 mg, 12.5 mg, Oral, Daily, Waqar Choudhury MD, 12.5 mg at 05/19/18 1303  •  warfarin (COUMADIN) tablet 5 mg, 5 mg, Oral, Daily, Waqar Choudhury MD, 5 mg at 05/19/18 9566     ASSESSMENT  Acute on chronic combined systolic diastolic CHF  Nonischemic cardiomyopathy  Hypertension  Hyperlipidemia  Gastroesophageal reflux disease      PLAN  Agree with current care  Diuresis and dobutamine per cardiology  Continue home medications  Supplement oxygen nebulizer and Mucinex  Stress ulcer DVT prophylaxis  Supportive care  Symptomatic treatment for cough nausea  Repeat labs in the morning  Will follow with Dr. GALVAN further recommendation according to hospital course    TALYA ART MD

## 2018-05-19 NOTE — PLAN OF CARE
Problem: Patient Care Overview  Goal: Plan of Care Review  Outcome: Ongoing (interventions implemented as appropriate)   05/19/18 2428   Coping/Psychosocial   Plan of Care Reviewed With patient   Plan of Care Review   Progress improving   OTHER   Outcome Summary Patient has good output and VSS on dobutamine gtt. strict intake and output. edema present. no respiratory distress noted.     Goal: Individualization and Mutuality  Outcome: Ongoing (interventions implemented as appropriate)    Goal: Discharge Needs Assessment  Outcome: Ongoing (interventions implemented as appropriate)    Goal: Interprofessional Rounds/Family Conf  Outcome: Ongoing (interventions implemented as appropriate)      Problem: Fluid Volume Excess (Adult)  Goal: Identify Related Risk Factors and Signs and Symptoms  Outcome: Outcome(s) achieved Date Met: 05/19/18    Goal: Optimal Fluid Balance  Outcome: Ongoing (interventions implemented as appropriate)

## 2018-05-20 LAB
ALBUMIN SERPL-MCNC: 3 G/DL (ref 3.5–5.2)
ALBUMIN/GLOB SERPL: 0.9 G/DL
ALP SERPL-CCNC: 159 U/L (ref 39–117)
ALT SERPL W P-5'-P-CCNC: 24 U/L (ref 1–41)
ANION GAP SERPL CALCULATED.3IONS-SCNC: 13.9 MMOL/L
AST SERPL-CCNC: 28 U/L (ref 1–40)
BASOPHILS # BLD AUTO: 0.01 10*3/MM3 (ref 0–0.2)
BASOPHILS NFR BLD AUTO: 0.2 % (ref 0–1.5)
BILIRUB SERPL-MCNC: 1.8 MG/DL (ref 0.1–1.2)
BUN BLD-MCNC: 19 MG/DL (ref 6–20)
BUN/CREAT SERPL: 11.4 (ref 7–25)
CALCIUM SPEC-SCNC: 8.9 MG/DL (ref 8.6–10.5)
CHLORIDE SERPL-SCNC: 100 MMOL/L (ref 98–107)
CHOLEST SERPL-MCNC: 86 MG/DL (ref 0–200)
CO2 SERPL-SCNC: 24.1 MMOL/L (ref 22–29)
CREAT BLD-MCNC: 1.67 MG/DL (ref 0.76–1.27)
DEPRECATED RDW RBC AUTO: 46 FL (ref 37–54)
EOSINOPHIL # BLD AUTO: 0.21 10*3/MM3 (ref 0–0.7)
EOSINOPHIL NFR BLD AUTO: 4 % (ref 0.3–6.2)
ERYTHROCYTE [DISTWIDTH] IN BLOOD BY AUTOMATED COUNT: 14.6 % (ref 11.5–14.5)
GFR SERPL CREATININE-BSD FRML MDRD: 52 ML/MIN/1.73
GLOBULIN UR ELPH-MCNC: 3.4 GM/DL
GLUCOSE BLD-MCNC: 110 MG/DL (ref 65–99)
HBA1C MFR BLD: 5.9 % (ref 4.8–5.6)
HCT VFR BLD AUTO: 37.5 % (ref 40.4–52.2)
HDLC SERPL-MCNC: 33 MG/DL (ref 40–60)
HGB BLD-MCNC: 12.4 G/DL (ref 13.7–17.6)
IMM GRANULOCYTES # BLD: 0 10*3/MM3 (ref 0–0.03)
IMM GRANULOCYTES NFR BLD: 0 % (ref 0–0.5)
INR PPP: 2.06 (ref 0.9–1.1)
LDLC SERPL CALC-MCNC: 43 MG/DL (ref 0–100)
LDLC/HDLC SERPL: 1.29 {RATIO}
LYMPHOCYTES # BLD AUTO: 1.55 10*3/MM3 (ref 0.9–4.8)
LYMPHOCYTES NFR BLD AUTO: 29.4 % (ref 19.6–45.3)
MCH RBC QN AUTO: 28.8 PG (ref 27–32.7)
MCHC RBC AUTO-ENTMCNC: 33.1 G/DL (ref 32.6–36.4)
MCV RBC AUTO: 87 FL (ref 79.8–96.2)
MONOCYTES # BLD AUTO: 0.75 10*3/MM3 (ref 0.2–1.2)
MONOCYTES NFR BLD AUTO: 14.2 % (ref 5–12)
NEUTROPHILS # BLD AUTO: 2.75 10*3/MM3 (ref 1.9–8.1)
NEUTROPHILS NFR BLD AUTO: 52.2 % (ref 42.7–76)
NT-PROBNP SERPL-MCNC: 3300 PG/ML (ref 5–900)
NT-PROBNP SERPL-MCNC: 4208 PG/ML (ref 5–900)
PLATELET # BLD AUTO: 302 10*3/MM3 (ref 140–500)
PMV BLD AUTO: 10.6 FL (ref 6–12)
POTASSIUM BLD-SCNC: 3.7 MMOL/L (ref 3.5–5.2)
PROT SERPL-MCNC: 6.4 G/DL (ref 6–8.5)
PROTHROMBIN TIME: 22.8 SECONDS (ref 11.7–14.2)
RBC # BLD AUTO: 4.31 10*6/MM3 (ref 4.6–6)
SODIUM BLD-SCNC: 138 MMOL/L (ref 136–145)
TRIGL SERPL-MCNC: 52 MG/DL (ref 0–150)
TROPONIN T SERPL-MCNC: 0.03 NG/ML (ref 0–0.03)
TSH SERPL DL<=0.05 MIU/L-ACNC: 1.79 MIU/ML (ref 0.27–4.2)
VLDLC SERPL-MCNC: 10.4 MG/DL (ref 5–40)
WBC NRBC COR # BLD: 5.27 10*3/MM3 (ref 4.5–10.7)

## 2018-05-20 PROCEDURE — 94799 UNLISTED PULMONARY SVC/PX: CPT

## 2018-05-20 PROCEDURE — 85610 PROTHROMBIN TIME: CPT | Performed by: HOSPITALIST

## 2018-05-20 PROCEDURE — 99232 SBSQ HOSP IP/OBS MODERATE 35: CPT | Performed by: INTERNAL MEDICINE

## 2018-05-20 PROCEDURE — 25010000002 DOBUTAMINE PER 250 MG: Performed by: INTERNAL MEDICINE

## 2018-05-20 PROCEDURE — 85025 COMPLETE CBC W/AUTO DIFF WBC: CPT | Performed by: HOSPITALIST

## 2018-05-20 PROCEDURE — 80053 COMPREHEN METABOLIC PANEL: CPT | Performed by: HOSPITALIST

## 2018-05-20 PROCEDURE — 84443 ASSAY THYROID STIM HORMONE: CPT | Performed by: HOSPITALIST

## 2018-05-20 PROCEDURE — 93010 ELECTROCARDIOGRAM REPORT: CPT | Performed by: INTERNAL MEDICINE

## 2018-05-20 PROCEDURE — 83036 HEMOGLOBIN GLYCOSYLATED A1C: CPT | Performed by: HOSPITALIST

## 2018-05-20 PROCEDURE — 84484 ASSAY OF TROPONIN QUANT: CPT | Performed by: INTERNAL MEDICINE

## 2018-05-20 PROCEDURE — 25010000002 FUROSEMIDE PER 20 MG: Performed by: INTERNAL MEDICINE

## 2018-05-20 PROCEDURE — 83880 ASSAY OF NATRIURETIC PEPTIDE: CPT | Performed by: HOSPITALIST

## 2018-05-20 PROCEDURE — 93005 ELECTROCARDIOGRAM TRACING: CPT | Performed by: HOSPITALIST

## 2018-05-20 PROCEDURE — 80061 LIPID PANEL: CPT | Performed by: HOSPITALIST

## 2018-05-20 PROCEDURE — 83880 ASSAY OF NATRIURETIC PEPTIDE: CPT | Performed by: INTERNAL MEDICINE

## 2018-05-20 RX ORDER — ATORVASTATIN CALCIUM 10 MG/1
10 TABLET, FILM COATED ORAL NIGHTLY
Status: DISCONTINUED | OUTPATIENT
Start: 2018-05-20 | End: 2018-05-23 | Stop reason: HOSPADM

## 2018-05-20 RX ADMIN — FUROSEMIDE 40 MG: 10 INJECTION, SOLUTION INTRAMUSCULAR; INTRAVENOUS at 15:00

## 2018-05-20 RX ADMIN — IPRATROPIUM BROMIDE AND ALBUTEROL SULFATE 3 ML: .5; 3 SOLUTION RESPIRATORY (INHALATION) at 23:56

## 2018-05-20 RX ADMIN — DOBUTAMINE IN DEXTROSE 2.5 MCG/KG/MIN: 100 INJECTION, SOLUTION INTRAVENOUS at 06:06

## 2018-05-20 RX ADMIN — PANTOPRAZOLE SODIUM 40 MG: 40 TABLET, DELAYED RELEASE ORAL at 17:54

## 2018-05-20 RX ADMIN — IPRATROPIUM BROMIDE AND ALBUTEROL SULFATE 3 ML: .5; 3 SOLUTION RESPIRATORY (INHALATION) at 11:29

## 2018-05-20 RX ADMIN — FUROSEMIDE 40 MG: 10 INJECTION, SOLUTION INTRAMUSCULAR; INTRAVENOUS at 20:00

## 2018-05-20 RX ADMIN — IPRATROPIUM BROMIDE AND ALBUTEROL SULFATE 3 ML: .5; 3 SOLUTION RESPIRATORY (INHALATION) at 07:43

## 2018-05-20 RX ADMIN — WARFARIN SODIUM 5 MG: 5 TABLET ORAL at 17:55

## 2018-05-20 RX ADMIN — POTASSIUM CHLORIDE 20 MEQ: 750 CAPSULE, EXTENDED RELEASE ORAL at 08:49

## 2018-05-20 RX ADMIN — FUROSEMIDE 40 MG: 10 INJECTION, SOLUTION INTRAMUSCULAR; INTRAVENOUS at 08:48

## 2018-05-20 RX ADMIN — METOPROLOL TARTRATE 100 MG: 100 TABLET, FILM COATED ORAL at 08:49

## 2018-05-20 RX ADMIN — IPRATROPIUM BROMIDE AND ALBUTEROL SULFATE 3 ML: .5; 3 SOLUTION RESPIRATORY (INHALATION) at 03:29

## 2018-05-20 RX ADMIN — POTASSIUM CHLORIDE 20 MEQ: 750 CAPSULE, EXTENDED RELEASE ORAL at 17:55

## 2018-05-20 RX ADMIN — GUAIFENESIN 600 MG: 600 TABLET, EXTENDED RELEASE ORAL at 08:49

## 2018-05-20 RX ADMIN — DIGOXIN 125 MCG: 0.12 TABLET ORAL at 11:26

## 2018-05-20 RX ADMIN — GUAIFENESIN 600 MG: 600 TABLET, EXTENDED RELEASE ORAL at 20:00

## 2018-05-20 RX ADMIN — PANTOPRAZOLE SODIUM 40 MG: 40 TABLET, DELAYED RELEASE ORAL at 06:52

## 2018-05-20 RX ADMIN — DOBUTAMINE IN DEXTROSE 5 MCG/KG/MIN: 100 INJECTION, SOLUTION INTRAVENOUS at 17:54

## 2018-05-20 RX ADMIN — SPIRONOLACTONE 12.5 MG: 25 TABLET ORAL at 08:49

## 2018-05-20 RX ADMIN — IPRATROPIUM BROMIDE AND ALBUTEROL SULFATE 3 ML: .5; 3 SOLUTION RESPIRATORY (INHALATION) at 15:00

## 2018-05-20 RX ADMIN — IPRATROPIUM BROMIDE AND ALBUTEROL SULFATE 3 ML: .5; 3 SOLUTION RESPIRATORY (INHALATION) at 19:40

## 2018-05-20 RX ADMIN — METOPROLOL TARTRATE 100 MG: 100 TABLET, FILM COATED ORAL at 20:00

## 2018-05-20 RX ADMIN — Medication 10 ML: at 15:00

## 2018-05-20 NOTE — PROGRESS NOTES
"Daily progress note    Chief complaint  Doing much better  No new complaints    History of present illness  57-year-old -American male with history of cardiomyopathy congestive heart failure chronic kidney disease hypertension chronic atrial fibrillation admitted to cardiology service from ER with shortness of breath for last 2 days.  Patient denies any chest pain.  Patient does have nonproductive cough.  Patient evaluated in ER found to be in decompensated heart failure admitted for management.  I'm asked to follow the patient for medical problem.    REVIEW OF SYSTEMS  Review of Systems   Constitutional: Negative.  Negative for chills and fever.   HENT: Negative.  Negative for sore throat.    Eyes: Negative.    Respiratory: Positive for cough and shortness of breath.    Cardiovascular: Negative.  Negative for chest pain.   Gastrointestinal: Negative.  Negative for nausea and vomiting.   Genitourinary: Negative.  Negative for dysuria.   Musculoskeletal: Negative.  Negative for back pain.   Skin: Negative.  Negative for rash.   Neurological: Negative.  Negative for headaches.   All other systems reviewed and are negative.     PHYSICAL EXAM  Blood pressure 98/70, pulse 109, temperature 98.2 °F (36.8 °C), temperature source Oral, resp. rate 18, height 190.5 cm (75\"), weight 86.7 kg (191 lb 3.2 oz), SpO2 100 %.    Constitutional: He is oriented to person, place, and time and well-developed, well-nourished, and in no distress.   Head: Normocephalic and atraumatic.   Eyes: EOM are normal. Pupils are equal, round, and reactive to light.   Neck: Normal range of motion. Neck supple. JVD (bilaterally, sitting upright ) present.   Cardiovascular: Regular rhythm, normal heart sounds and intact distal pulses.  Tachycardia present.    Pulmonary/Chest: Effort normal and breath sounds normal. No respiratory distress. He has no wheezes. He has no rales.   Decreased breath sounds bilaterally    Abdominal: Soft. There is no " tenderness. There is no rebound and no guarding.   Musculoskeletal: Normal range of motion. He exhibits edema (moderate ).   Neurological: He is alert and oriented to person, place, and time. He has normal sensation and normal strength.   Skin: Skin is warm and dry.   Psychiatric: Mood and affect normal.     LAB RESULTS  Lab Results (last 24 hours)     Procedure Component Value Units Date/Time    BNP [[id number]]  (Abnormal) Collected:  05/20/18 1250    Specimen:  Blood Updated:  05/20/18 1354     proBNP 3,300.0 (H) pg/mL     Narrative:       Among patients with dyspnea, NT-proBNP is highly sensitive for the detection of acute congestive heart failure. In addition NT-proBNP of <300 pg/ml effectively rules out acute congestive heart failure with 99% negative predictive value.    Troponin [[id number]]  (Abnormal) Collected:  05/20/18 0533    Specimen:  Blood Updated:  05/20/18 0715     Troponin T 0.032 (H) ng/mL     Narrative:       Troponin T Reference Ranges:  Less than 0.03 ng/mL:    Negative for AMI  0.03 to 0.09 ng/mL:      Indeterminant for AMI  Greater than 0.09 ng/mL: Positive for AMI    BNP [[id number]]  (Abnormal) Collected:  05/20/18 0533    Specimen:  Blood Updated:  05/20/18 0707     proBNP 4,208.0 (H) pg/mL     Narrative:       Among patients with dyspnea, NT-proBNP is highly sensitive for the detection of acute congestive heart failure. In addition NT-proBNP of <300 pg/ml effectively rules out acute congestive heart failure with 99% negative predictive value.    TSH [[id number]]  (Normal) Collected:  05/20/18 0533    Specimen:  Blood Updated:  05/20/18 0707     TSH 1.790 mIU/mL     Comprehensive Metabolic Panel [[id number]]  (Abnormal) Collected:  05/20/18 0533    Specimen:  Blood Updated:  05/20/18 0702     Glucose 110 (H) mg/dL      BUN 19 mg/dL      Creatinine 1.67 (H) mg/dL      Sodium 138 mmol/L      Potassium 3.7 mmol/L      Chloride 100 mmol/L      CO2 24.1 mmol/L      Calcium 8.9 mg/dL      Total  Protein 6.4 g/dL      Albumin 3.00 (L) g/dL      ALT (SGPT) 24 U/L      AST (SGOT) 28 U/L      Alkaline Phosphatase 159 (H) U/L      Total Bilirubin 1.8 (H) mg/dL      eGFR  African Amer 52 (L) mL/min/1.73      Globulin 3.4 gm/dL      A/G Ratio 0.9 g/dL      BUN/Creatinine Ratio 11.4     Anion Gap 13.9 mmol/L     Lipid Panel [[id number]]  (Abnormal) Collected:  05/20/18 0533    Specimen:  Blood Updated:  05/20/18 0702     Total Cholesterol 86 mg/dL      Triglycerides 52 mg/dL      HDL Cholesterol 33 (L) mg/dL      LDL Cholesterol  43 mg/dL      VLDL Cholesterol 10.4 mg/dL      LDL/HDL Ratio 1.29    Narrative:       Cholesterol Reference Ranges  (U.S. Department of Health and Human Services ATP III Classifications)    Desirable          <200 mg/dL  Borderline High    200-239 mg/dL  High Risk          >240 mg/dL      Triglyceride Reference Ranges  (U.S. Department of Health and Human Services ATP III Classifications)    Normal           <150 mg/dL  Borderline High  150-199 mg/dL  High             200-499 mg/dL  Very High        >500 mg/dL    HDL Reference Ranges  (U.S. Department of Health and Human Services ATP III Classifcations)    Low     <40 mg/dl (major risk factor for CHD)  High    >60 mg/dl ('negative' risk factor for CHD)        LDL Reference Ranges  (U.S. Department of Health and Human Services ATP III Classifcations)    Optimal          <100 mg/dL  Near Optimal     100-129 mg/dL  Borderline High  130-159 mg/dL  High             160-189 mg/dL  Very High        >189 mg/dL    CBC & Differential [[id number]] Collected:  05/20/18 0533    Specimen:  Blood Updated:  05/20/18 0658    Narrative:       The following orders were created for panel order CBC & Differential.  Procedure                               Abnormality         Status                     ---------                               -----------         ------                     CBC Auto Differential[[id number]]        Abnormal            Final result                  Please view results for these tests on the individual orders.    CBC Auto Differential [[id number]]  (Abnormal) Collected:  05/20/18 0533    Specimen:  Blood Updated:  05/20/18 0658     WBC 5.27 10*3/mm3      RBC 4.31 (L) 10*6/mm3      Hemoglobin 12.4 (L) g/dL      Hematocrit 37.5 (L) %      MCV 87.0 fL      MCH 28.8 pg      MCHC 33.1 g/dL      RDW 14.6 (H) %      RDW-SD 46.0 fl      MPV 10.6 fL      Platelets 302 10*3/mm3      Neutrophil % 52.2 %      Lymphocyte % 29.4 %      Monocyte % 14.2 (H) %      Eosinophil % 4.0 %      Basophil % 0.2 %      Immature Grans % 0.0 %      Neutrophils, Absolute 2.75 10*3/mm3      Lymphocytes, Absolute 1.55 10*3/mm3      Monocytes, Absolute 0.75 10*3/mm3      Eosinophils, Absolute 0.21 10*3/mm3      Basophils, Absolute 0.01 10*3/mm3      Immature Grans, Absolute 0.00 10*3/mm3     Protime-INR [[id number]]  (Abnormal) Collected:  05/20/18 0533    Specimen:  Blood Updated:  05/20/18 0648     Protime 22.8 (H) Seconds      INR 2.06 (H)    Hemoglobin A1c [[id number]]  (Abnormal) Collected:  05/20/18 0533    Specimen:  Blood Updated:  05/20/18 0646     Hemoglobin A1C 5.90 (H) %     Narrative:       Hemoglobin A1C Ranges:    Increased Risk for Diabetes  5.7% to 6.4%  Diabetes                     >= 6.5%  Diabetic Goal                < 7.0%        Imaging Results (last 24 hours)     ** No results found for the last 24 hours. **        EKG                              Rhythm/Rate: sinus tachycardia at 120  P waves and KS: normal  QRS, axis: normal axis, LVH   ST and T waves: lateral ST changes        Current Facility-Administered Medications:   •  atorvastatin (LIPITOR) tablet 40 mg, 40 mg, Oral, Nightly, Waqar Choudhury MD, 40 mg at 05/19/18 2140  •  digoxin (LANOXIN) tablet 125 mcg, 125 mcg, Oral, Daily, Waqar Choudhury MD, 125 mcg at 05/20/18 1126  •  DOBUTamine (DOBUTREX) 1 mg/mL infusion, 5 mcg/kg/min, Intravenous, Titrated, Waqar Choudhury MD, Last Rate: 27.5  mL/hr at 05/20/18 1239, 5 mcg/kg/min at 05/20/18 1239  •  furosemide (LASIX) injection 40 mg, 40 mg, Intravenous, TID, Waqar Choudhury MD, 40 mg at 05/20/18 1500  •  guaiFENesin (MUCINEX) 12 hr tablet 600 mg, 600 mg, Oral, Q12H, Kamran Barba MD, 600 mg at 05/20/18 0849  •  ipratropium-albuterol (DUO-NEB) nebulizer solution 3 mL, 3 mL, Nebulization, Q4H - RT, Kamran Barba MD, 3 mL at 05/20/18 1500  •  metoprolol tartrate (LOPRESSOR) tablet 100 mg, 100 mg, Oral, Q12H, Waqar Choudhury MD, 100 mg at 05/20/18 0849  •  nitroglycerin (NITROSTAT) SL tablet 0.4 mg, 0.4 mg, Sublingual, Q5 Min PRN, Waqar Choudhury MD  •  pantoprazole (PROTONIX) EC tablet 40 mg, 40 mg, Oral, BID AC, Kamran Barba MD, 40 mg at 05/20/18 0652  •  potassium chloride (MICRO-K) CR capsule 20 mEq, 20 mEq, Oral, BID With Meals, Waqar Choudhury MD, 20 mEq at 05/20/18 0849  •  sodium chloride 0.9 % flush 1-10 mL, 1-10 mL, Intravenous, PRN, Waqar Choudhury MD  •  Insert peripheral IV, , , Once **AND** sodium chloride 0.9 % flush 10 mL, 10 mL, Intravenous, PRN, Bebeto Paz MD, 10 mL at 05/20/18 1500  •  spironolactone (ALDACTONE) tablet 12.5 mg, 12.5 mg, Oral, Daily, Waqar Choudhury MD, 12.5 mg at 05/20/18 0849  •  warfarin (COUMADIN) tablet 5 mg, 5 mg, Oral, Daily, Waqar Choudhury MD, 5 mg at 05/19/18 0036     ASSESSMENT  Acute on chronic combined systolic diastolic CHF  Nonischemic cardiomyopathy  Hypertension  Hyperlipidemia  Gastroesophageal reflux disease      PLAN  CPM  Diuresis and dobutamine   Continue home medications  Supplement oxygen nebulizer and Mucinex  Stress ulcer DVT prophylaxis  Supportive care  Symptomatic treatment for cough   Will follow with Dr. MANDY BARBA MD

## 2018-05-20 NOTE — PLAN OF CARE
Problem: Patient Care Overview  Goal: Plan of Care Review  Outcome: Ongoing (interventions implemented as appropriate)   05/20/18 3835   Coping/Psychosocial   Plan of Care Reviewed With patient   Plan of Care Review   Progress improving   OTHER   Outcome Summary Patient continues to be on Dobutamine gtt, Lasix given as ordered but metoprolol held per Waqar Barber orders. Patient stable denies pain or discomfort. No respiratory distress noted noted at this time. Will continue to monitor patient.

## 2018-05-20 NOTE — PLAN OF CARE
Problem: Patient Care Overview  Goal: Plan of Care Review  Outcome: Ongoing (interventions implemented as appropriate)   05/20/18 1811   Plan of Care Review   Progress improving   OTHER   Outcome Summary VSS, tolerating being to room air. Denies any pain this shift. Adequate response to diuretics, see IO charting. Dobutamine gtt increased to 5 mcg/kg/min per orders, patient tolerating- no abnormal rhythms/ectopy seen. LE edema improved. Coumadin administered for chronic AFib, INR 2.06. Continue to monitor.       Problem: Fluid Volume Excess (Adult)  Goal: Optimal Fluid Balance  Outcome: Ongoing (interventions implemented as appropriate)   05/20/18 1811   Fluid Volume Excess (Adult)   Optimal Fluid Balance making progress toward outcome       Problem: Cardiac: Heart Failure (Adult)  Goal: Signs and Symptoms of Listed Potential Problems Will be Absent, Minimized or Managed (Cardiac: Heart Failure)  Outcome: Ongoing (interventions implemented as appropriate)   05/20/18 1811   Goal/Outcome Evaluation   Problems Assessed (Heart Failure) all   Problems Present (Heart Failure) cardiac pump dysfunction;decreased quality of life;fluid/electrolyte imbalance;respiratory compromise

## 2018-05-21 LAB
ANION GAP SERPL CALCULATED.3IONS-SCNC: 15.9 MMOL/L
B PERT DNA SPEC QL NAA+PROBE: NOT DETECTED
BUN BLD-MCNC: 19 MG/DL (ref 6–20)
BUN/CREAT SERPL: 11.9 (ref 7–25)
C PNEUM DNA NPH QL NAA+NON-PROBE: NOT DETECTED
CALCIUM SPEC-SCNC: 8.6 MG/DL (ref 8.6–10.5)
CHLORIDE SERPL-SCNC: 96 MMOL/L (ref 98–107)
CO2 SERPL-SCNC: 25.1 MMOL/L (ref 22–29)
CREAT BLD-MCNC: 1.6 MG/DL (ref 0.76–1.27)
FLUAV H1 2009 PAND RNA NPH QL NAA+PROBE: NOT DETECTED
FLUAV H1 HA GENE NPH QL NAA+PROBE: NOT DETECTED
FLUAV H3 RNA NPH QL NAA+PROBE: NOT DETECTED
FLUAV SUBTYP SPEC NAA+PROBE: NOT DETECTED
FLUBV RNA ISLT QL NAA+PROBE: NOT DETECTED
GFR SERPL CREATININE-BSD FRML MDRD: 54 ML/MIN/1.73
GLUCOSE BLD-MCNC: 149 MG/DL (ref 65–99)
HADV DNA SPEC NAA+PROBE: NOT DETECTED
HCOV 229E RNA SPEC QL NAA+PROBE: NOT DETECTED
HCOV HKU1 RNA SPEC QL NAA+PROBE: NOT DETECTED
HCOV NL63 RNA SPEC QL NAA+PROBE: NOT DETECTED
HCOV OC43 RNA SPEC QL NAA+PROBE: NOT DETECTED
HMPV RNA NPH QL NAA+NON-PROBE: NOT DETECTED
HPIV1 RNA SPEC QL NAA+PROBE: NOT DETECTED
HPIV2 RNA SPEC QL NAA+PROBE: NOT DETECTED
HPIV3 RNA NPH QL NAA+PROBE: NOT DETECTED
HPIV4 P GENE NPH QL NAA+PROBE: NOT DETECTED
INR PPP: 1.96 (ref 0.9–1.1)
M PNEUMO IGG SER IA-ACNC: NOT DETECTED
NT-PROBNP SERPL-MCNC: 2440 PG/ML (ref 5–900)
POTASSIUM BLD-SCNC: 3.4 MMOL/L (ref 3.5–5.2)
PROTHROMBIN TIME: 22 SECONDS (ref 11.7–14.2)
RHINOVIRUS RNA SPEC NAA+PROBE: NOT DETECTED
RSV RNA NPH QL NAA+NON-PROBE: NOT DETECTED
SODIUM BLD-SCNC: 137 MMOL/L (ref 136–145)

## 2018-05-21 PROCEDURE — 94799 UNLISTED PULMONARY SVC/PX: CPT

## 2018-05-21 PROCEDURE — 87633 RESP VIRUS 12-25 TARGETS: CPT | Performed by: HOSPITALIST

## 2018-05-21 PROCEDURE — 87581 M.PNEUMON DNA AMP PROBE: CPT | Performed by: HOSPITALIST

## 2018-05-21 PROCEDURE — 87486 CHLMYD PNEUM DNA AMP PROBE: CPT | Performed by: HOSPITALIST

## 2018-05-21 PROCEDURE — 25010000002 FUROSEMIDE PER 20 MG: Performed by: INTERNAL MEDICINE

## 2018-05-21 PROCEDURE — 85610 PROTHROMBIN TIME: CPT | Performed by: INTERNAL MEDICINE

## 2018-05-21 PROCEDURE — 99232 SBSQ HOSP IP/OBS MODERATE 35: CPT | Performed by: NURSE PRACTITIONER

## 2018-05-21 PROCEDURE — 80048 BASIC METABOLIC PNL TOTAL CA: CPT | Performed by: INTERNAL MEDICINE

## 2018-05-21 PROCEDURE — 83880 ASSAY OF NATRIURETIC PEPTIDE: CPT | Performed by: INTERNAL MEDICINE

## 2018-05-21 PROCEDURE — 25010000002 DOBUTAMINE PER 250 MG: Performed by: INTERNAL MEDICINE

## 2018-05-21 PROCEDURE — 25010000002 DOBUTAMINE PER 250 MG: Performed by: NURSE PRACTITIONER

## 2018-05-21 PROCEDURE — 87798 DETECT AGENT NOS DNA AMP: CPT | Performed by: HOSPITALIST

## 2018-05-21 PROCEDURE — 90791 PSYCH DIAGNOSTIC EVALUATION: CPT | Performed by: SOCIAL WORKER

## 2018-05-21 RX ORDER — POTASSIUM CHLORIDE 750 MG/1
20 CAPSULE, EXTENDED RELEASE ORAL
Status: DISCONTINUED | OUTPATIENT
Start: 2018-05-21 | End: 2018-05-23 | Stop reason: HOSPADM

## 2018-05-21 RX ORDER — METOPROLOL SUCCINATE 100 MG/1
100 TABLET, EXTENDED RELEASE ORAL EVERY 12 HOURS SCHEDULED
Status: DISCONTINUED | OUTPATIENT
Start: 2018-05-21 | End: 2018-05-23 | Stop reason: HOSPADM

## 2018-05-21 RX ORDER — POTASSIUM CHLORIDE 750 MG/1
40 CAPSULE, EXTENDED RELEASE ORAL ONCE
Status: COMPLETED | OUTPATIENT
Start: 2018-05-21 | End: 2018-05-21

## 2018-05-21 RX ORDER — GUAIFENESIN 600 MG/1
1200 TABLET, EXTENDED RELEASE ORAL EVERY 12 HOURS SCHEDULED
Status: DISCONTINUED | OUTPATIENT
Start: 2018-05-21 | End: 2018-05-23 | Stop reason: HOSPADM

## 2018-05-21 RX ORDER — ECHINACEA PURPUREA EXTRACT 125 MG
2 TABLET ORAL AS NEEDED
Status: DISCONTINUED | OUTPATIENT
Start: 2018-05-21 | End: 2018-05-23 | Stop reason: HOSPADM

## 2018-05-21 RX ORDER — WARFARIN SODIUM 2.5 MG/1
2.5 TABLET ORAL
Status: COMPLETED | OUTPATIENT
Start: 2018-05-21 | End: 2018-05-21

## 2018-05-21 RX ADMIN — METOPROLOL TARTRATE 100 MG: 100 TABLET, FILM COATED ORAL at 08:39

## 2018-05-21 RX ADMIN — DOBUTAMINE IN DEXTROSE 2.5 MCG/KG/MIN: 100 INJECTION, SOLUTION INTRAVENOUS at 16:55

## 2018-05-21 RX ADMIN — IPRATROPIUM BROMIDE AND ALBUTEROL SULFATE 3 ML: .5; 3 SOLUTION RESPIRATORY (INHALATION) at 03:31

## 2018-05-21 RX ADMIN — DIGOXIN 125 MCG: 0.12 TABLET ORAL at 12:50

## 2018-05-21 RX ADMIN — FUROSEMIDE 40 MG: 10 INJECTION, SOLUTION INTRAMUSCULAR; INTRAVENOUS at 08:39

## 2018-05-21 RX ADMIN — IPRATROPIUM BROMIDE AND ALBUTEROL SULFATE 3 ML: .5; 3 SOLUTION RESPIRATORY (INHALATION) at 16:08

## 2018-05-21 RX ADMIN — FUROSEMIDE 40 MG: 10 INJECTION, SOLUTION INTRAMUSCULAR; INTRAVENOUS at 20:48

## 2018-05-21 RX ADMIN — GUAIFENESIN 1200 MG: 600 TABLET, EXTENDED RELEASE ORAL at 20:48

## 2018-05-21 RX ADMIN — WARFARIN SODIUM 5 MG: 5 TABLET ORAL at 19:36

## 2018-05-21 RX ADMIN — SALINE NASAL SPRAY 2 SPRAY: 1.5 SOLUTION NASAL at 12:50

## 2018-05-21 RX ADMIN — PANTOPRAZOLE SODIUM 40 MG: 40 TABLET, DELAYED RELEASE ORAL at 06:51

## 2018-05-21 RX ADMIN — Medication 10 ML: at 08:40

## 2018-05-21 RX ADMIN — POTASSIUM CHLORIDE 40 MEQ: 750 CAPSULE, EXTENDED RELEASE ORAL at 12:49

## 2018-05-21 RX ADMIN — ATORVASTATIN CALCIUM 10 MG: 10 TABLET, FILM COATED ORAL at 20:48

## 2018-05-21 RX ADMIN — FUROSEMIDE 40 MG: 10 INJECTION, SOLUTION INTRAMUSCULAR; INTRAVENOUS at 16:55

## 2018-05-21 RX ADMIN — PANTOPRAZOLE SODIUM 40 MG: 40 TABLET, DELAYED RELEASE ORAL at 16:55

## 2018-05-21 RX ADMIN — IPRATROPIUM BROMIDE AND ALBUTEROL SULFATE 3 ML: .5; 3 SOLUTION RESPIRATORY (INHALATION) at 11:52

## 2018-05-21 RX ADMIN — POTASSIUM CHLORIDE 20 MEQ: 750 CAPSULE, EXTENDED RELEASE ORAL at 19:36

## 2018-05-21 RX ADMIN — SPIRONOLACTONE 12.5 MG: 25 TABLET ORAL at 08:39

## 2018-05-21 RX ADMIN — POTASSIUM CHLORIDE 20 MEQ: 750 CAPSULE, EXTENDED RELEASE ORAL at 08:39

## 2018-05-21 RX ADMIN — METOPROLOL SUCCINATE 100 MG: 100 TABLET, FILM COATED, EXTENDED RELEASE ORAL at 20:48

## 2018-05-21 RX ADMIN — WARFARIN SODIUM 2.5 MG: 2.5 TABLET ORAL at 19:36

## 2018-05-21 RX ADMIN — GUAIFENESIN 600 MG: 600 TABLET, EXTENDED RELEASE ORAL at 08:39

## 2018-05-21 RX ADMIN — HYDROCODONE POLISTIREX AND CHLORPHENIRAMINE POLISITREX 5 ML: 10; 8 SUSPENSION, EXTENDED RELEASE ORAL at 01:55

## 2018-05-21 RX ADMIN — DOBUTAMINE IN DEXTROSE 5 MCG/KG/MIN: 100 INJECTION, SOLUTION INTRAVENOUS at 04:03

## 2018-05-21 RX ADMIN — HYDROCODONE POLISTIREX AND CHLORPHENIRAMINE POLISTIREX 10 ML: 10; 8 SUSPENSION, EXTENDED RELEASE ORAL at 16:59

## 2018-05-21 NOTE — PROGRESS NOTES
"    Patient Name: Hernando Caro  :1960  57 y.o.      Patient Care Team:  Jerald Sebastian MD as PCP - General (Internal Medicine)  Jerald Sebastian MD as PCP - Family Medicine  Jerald Sebastian MD    Chief Complaint: follow up heart failure     Interval History: He feels great. Orthopnea is gone. Complains of dry cough. Diuresed well. Tachycardia noted.        Objective   Vital Signs  Temp:  [97.8 °F (36.6 °C)-98.7 °F (37.1 °C)] 98.4 °F (36.9 °C)  Heart Rate:  [] 102  Resp:  [18-20] 20  BP: ()/(70-91) 114/91    Intake/Output Summary (Last 24 hours) at 18 1221  Last data filed at 18 1051   Gross per 24 hour   Intake          1917.58 ml   Output             4300 ml   Net         -2382.42 ml     Flowsheet Rows      First Filed Value   Admission Height  195.6 cm (77\") Documented at 2018 0738   Admission Weight  91.6 kg (202 lb) Documented at 2018 0738          Physical Exam:   General Appearance:    Alert, cooperative, in no acute distress   Lungs:     Diminished right lower lobe to auscultation.  Normal respiratory effort and rate.      Heart:    irregular rhythm and tachycardic rate, normal S1 and S2, no murmurs, gallops or rubs.     Chest Wall:    No abnormalities observed   Abdomen:     Soft, nontender, positive bowel sounds.     Extremities:   no cyanosis, clubbing or edema.  No marked joint deformities.  Adequate musculoskeletal strength.       Results Review:      Results from last 7 days  Lab Units 18  0443   SODIUM mmol/L 137   POTASSIUM mmol/L 3.4*   CHLORIDE mmol/L 96*   CO2 mmol/L 25.1   BUN mg/dL 19   CREATININE mg/dL 1.60*   GLUCOSE mg/dL 149*   CALCIUM mg/dL 8.6       Results from last 7 days  Lab Units 18  0533 18  0800   TROPONIN T ng/mL 0.032* 0.052*       Results from last 7 days  Lab Units 18  0533   WBC 10*3/mm3 5.27   HEMOGLOBIN g/dL 12.4*   HEMATOCRIT % 37.5*   PLATELETS 10*3/mm3 302       Results from last 7 days  Lab Units " 05/21/18  0443 05/20/18  0533 05/19/18  0800   INR  1.96* 2.06* 2.41*           Results from last 7 days  Lab Units 05/20/18  0533   CHOLESTEROL mg/dL 86   TRIGLYCERIDES mg/dL 52   HDL CHOL mg/dL 33*   LDL CHOL mg/dL 43               Medication Review:     atorvastatin 10 mg Oral Nightly   digoxin 125 mcg Oral Daily   furosemide 40 mg Intravenous TID   guaiFENesin 600 mg Oral Q12H   ipratropium-albuterol 3 mL Nebulization Q4H - RT   metoprolol tartrate 100 mg Oral Q12H   pantoprazole 40 mg Oral BID AC   potassium chloride 20 mEq Oral BID With Meals   spironolactone 12.5 mg Oral Daily   warfarin 5 mg Oral Daily          DOBUTamine 2.5 mcg/kg/min Last Rate: 5 mcg/kg/min (05/21/18 0840)       Assessment/Plan   1. Acute on chronic systolic heart failure - EF 30% on echo. He did very well with IV diuresis and dobutamine. Wean dobutamine and stop it this evening. Continue IV diuretics today and likely change to oral tomorrow. Change metoprolol tartrate to succinate. Continue spironolactone. He is allergic to ACE-I (angioedema)    2. Atrial fibrillation - needs better control. Hopefully will improve when dobutamine is stopped. Continue beta blocker and dig. He is on warfarin. INR trending down. Will give 7.5 mg today.     3. Nonischemic cardiomyopathy -  Normal cath 04/2018    4. Moderate to severe TR , mild pulmonary HTN RSVP 45 mmHg    5. CKD - creatinine near baseline.     6. Hypokalemia - careful replacement with kidney disease and spironolactone.     JANET Roca  [facility]  05/21/18  12:21 PM

## 2018-05-21 NOTE — PLAN OF CARE
Problem: Patient Care Overview  Goal: Plan of Care Review  Outcome: Ongoing (interventions implemented as appropriate)   05/21/18 0532   Plan of Care Review   Progress improving   OTHER   Outcome Summary vital signs stable; denies pain; PRN med ordered for dry cough; dobutamine gtt continues @ 5mcg/hr; HR elevated; pt asymptomatic; MD notified; will continue to monitor       Problem: Fluid Volume Excess (Adult)  Goal: Optimal Fluid Balance  Outcome: Ongoing (interventions implemented as appropriate)      Problem: Cardiac: Heart Failure (Adult)  Goal: Signs and Symptoms of Listed Potential Problems Will be Absent, Minimized or Managed (Cardiac: Heart Failure)  Outcome: Ongoing (interventions implemented as appropriate)

## 2018-05-21 NOTE — CONSULTS
"[facility] evaluated pt. Pt talked about the difficulties he has gone thru with his illness. He talked about everything being fine until this past March when he started feeling short of breath. Pt states he has felt some depressed mood due to the obstacles the illness has created. He states he has been off work for \"too long.\" He states it has caused financial pressure and some tension btw he and his fiancee, who has picked up extra hours to help pay the bills. Pt states he is used to being productive and working hard. He is eager to return to his job. His company said they would put him on lite duty.Pt denies SI past or present. He states he has never been treated for psychiatric issues. Pt rarely drinks alcohol and gave up smoking after his heart issue was diagnosed. Pt rates both his anxiety and depressed mood as an \"8.\" He does not want any medication or counseling but states he just wanted to talk it out with somebody. Pt states he is feeling hopeful that his condition is improving and moving in the right direction. Pt states that he and his fiancee have been having increased conflict due to the pressure created by the illness. Pt states he just closed on a new house this past April. Pt states he has been home alone a lot lately due to fiancee working more. He states it gets scary when he feels like he gets out of breath and he can't reach his doctor. He states he heard about a [employer] program that you can do a skype visit with a doctor quickly and he plans to sign up for it.   Pt states he has been engaged for 2 yrs. He states he has 3 grown children and a 13 yr old child he sees every other weekend. Pt has worked as an  for 15 yrs with his current job. He states he went to college for two yrs. Pt denies any legal issues. He states he attends a [facility] on occasion. Pt states he does not feel he has a support system but admits he keeps feelings to himself oftentimes. Access will follow " though pt did not feel he needed ant outpt resources at this time.

## 2018-05-21 NOTE — PLAN OF CARE
Problem: Patient Care Overview  Goal: Plan of Care Review  Outcome: Ongoing (interventions implemented as appropriate)   05/21/18 1720   Coping/Psychosocial   Plan of Care Reviewed With patient   Plan of Care Review   Progress improving   OTHER   Outcome Summary VSS, Dobutamin gtt decreased, increased dose of Tussionex, access consulted re pt co depression re dx, cost of meds, etc, nutrition consulted but yet to see, CCP consulted to assist c/ cost of meds but yet to see, denies pain, IV Lasix, CTM       Problem: Fluid Volume Excess (Adult)  Goal: Optimal Fluid Balance  Outcome: Ongoing (interventions implemented as appropriate)      Problem: Cardiac: Heart Failure (Adult)  Goal: Signs and Symptoms of Listed Potential Problems Will be Absent, Minimized or Managed (Cardiac: Heart Failure)  Outcome: Ongoing (interventions implemented as appropriate)

## 2018-05-21 NOTE — PROGRESS NOTES
"Daily progress note    Chief complaint  Severe nonproductive cough    History of present illness  57-year-old -American male with history of cardiomyopathy congestive heart failure chronic kidney disease hypertension chronic atrial fibrillation admitted to cardiology service from ER with shortness of breath for last 2 days.  Patient denies any chest pain.  Patient does have nonproductive cough.  Patient evaluated in ER found to be in decompensated heart failure admitted for management.  I'm asked to follow the patient for medical problem.    REVIEW OF SYSTEMS  Review of Systems   Constitutional: Negative.  Negative for chills and fever.   HENT: Negative.  Negative for sore throat.    Eyes: Negative.    Respiratory: Positive for cough and shortness of breath.    Cardiovascular: Negative.  Negative for chest pain.   Gastrointestinal: Negative.  Negative for nausea and vomiting.   Genitourinary: Negative.  Negative for dysuria.   Musculoskeletal: Negative.  Negative for back pain.   Skin: Negative.  Negative for rash.   Neurological: Negative.  Negative for headaches.   All other systems reviewed and are negative.     PHYSICAL EXAM  Blood pressure 112/94, pulse 115, temperature 97.9 °F (36.6 °C), temperature source Oral, resp. rate 20, height 190.5 cm (75\"), weight 86.7 kg (191 lb 3.2 oz), SpO2 97 %.    Constitutional: He is oriented to person, place, and time and well-developed, well-nourished, and in no distress.   Head: Normocephalic and atraumatic.   Eyes: EOM are normal. Pupils are equal, round, and reactive to light.   Neck: Normal range of motion. Neck supple. JVD (bilaterally, sitting upright ) present.   Cardiovascular: Regular rhythm, normal heart sounds and intact distal pulses.  Tachycardia present.    Pulmonary/Chest: Effort normal and breath sounds normal. No respiratory distress. He has no wheezes. He has no rales.   Decreased breath sounds bilaterally    Abdominal: Soft. There is no tenderness. " There is no rebound and no guarding.   Musculoskeletal: Normal range of motion. He exhibits edema (moderate ).   Neurological: He is alert and oriented to person, place, and time. He has normal sensation and normal strength.   Skin: Skin is warm and dry.   Psychiatric: Mood and affect normal.     LAB RESULTS  Lab Results (last 24 hours)     Procedure Component Value Units Date/Time    Basic Metabolic Panel [[id number]]  (Abnormal) Collected:  05/21/18 0443    Specimen:  Blood Updated:  05/21/18 0525     Glucose 149 (H) mg/dL      BUN 19 mg/dL      Creatinine 1.60 (H) mg/dL      Sodium 137 mmol/L      Potassium 3.4 (L) mmol/L      Chloride 96 (L) mmol/L      CO2 25.1 mmol/L      Calcium 8.6 mg/dL      eGFR   Amer 54 (L) mL/min/1.73      BUN/Creatinine Ratio 11.9     Anion Gap 15.9 mmol/L     Narrative:       GFR Normal >60  Chronic Kidney Disease <60  Kidney Failure <15    BNP [[id number]]  (Abnormal) Collected:  05/21/18 0443    Specimen:  Blood Updated:  05/21/18 0524     proBNP 2,440.0 (H) pg/mL     Narrative:       Among patients with dyspnea, NT-proBNP is highly sensitive for the detection of acute congestive heart failure. In addition NT-proBNP of <300 pg/ml effectively rules out acute congestive heart failure with 99% negative predictive value.    Protime-INR [[id number]]  (Abnormal) Collected:  05/21/18 0443    Specimen:  Blood Updated:  05/21/18 0506     Protime 22.0 (H) Seconds      INR 1.96 (H)        Imaging Results (last 24 hours)     ** No results found for the last 24 hours. **        EKG                              Rhythm/Rate: sinus tachycardia at 120  P waves and NJ: normal  QRS, axis: normal axis, LVH   ST and T waves: lateral ST changes        Current Facility-Administered Medications:   •  atorvastatin (LIPITOR) tablet 10 mg, 10 mg, Oral, Nightly, Kamran Barba MD  •  digoxin (LANOXIN) tablet 125 mcg, 125 mcg, Oral, Daily, Waqar Choudhury MD, 125 mcg at 05/21/18 1250  •  DOBUTamine  (DOBUTREX) 1 mg/mL infusion, 2.5 mcg/kg/min, Intravenous, Titrated, JANET Bose, Last Rate: 13.74 mL/hr at 05/21/18 1250, 2.5 mcg/kg/min at 05/21/18 1250  •  furosemide (LASIX) injection 40 mg, 40 mg, Intravenous, TID, Waqar Choudhury MD, 40 mg at 05/21/18 0839  •  guaiFENesin (MUCINEX) 12 hr tablet 600 mg, 600 mg, Oral, Q12H, Kamran Barba MD, 600 mg at 05/21/18 0839  •  HYDROcod Polst-CPM Polst ER (TUSSIONEX PENNKINETIC) 10-8 MG/5ML ER suspension 10 mL, 10 mL, Oral, Q12H PRN, Kamran Barba MD  •  ipratropium-albuterol (DUO-NEB) nebulizer solution 3 mL, 3 mL, Nebulization, Q4H - RT, Kamran Barba MD, 3 mL at 05/21/18 1152  •  metoprolol succinate XL (TOPROL-XL) 24 hr tablet 100 mg, 100 mg, Oral, Q12H, Madeleine Jackson, JANET  •  nitroglycerin (NITROSTAT) SL tablet 0.4 mg, 0.4 mg, Sublingual, Q5 Min PRN, Wqaar Choudhury MD  •  pantoprazole (PROTONIX) EC tablet 40 mg, 40 mg, Oral, BID AC, Kamran Barba MD, 40 mg at 05/21/18 0651  •  potassium chloride (MICRO-K) CR capsule 20 mEq, 20 mEq, Oral, BID With Meals, Waqar Choudhury MD, 20 mEq at 05/21/18 0839  •  sodium chloride (OCEAN) nasal spray 2 spray, 2 spray, Each Nare, PRN, Amrit Mast MD, 2 spray at 05/21/18 1250  •  sodium chloride 0.9 % flush 1-10 mL, 1-10 mL, Intravenous, PRN, Waqar Choudhury MD, 10 mL at 05/21/18 0840  •  Insert peripheral IV, , , Once **AND** sodium chloride 0.9 % flush 10 mL, 10 mL, Intravenous, PRN, Bebeto Paz MD, 10 mL at 05/20/18 1500  •  spironolactone (ALDACTONE) tablet 12.5 mg, 12.5 mg, Oral, Daily, Waqar Choudhury MD, 12.5 mg at 05/21/18 0839  •  warfarin (COUMADIN) tablet 2.5 mg, 2.5 mg, Oral, Once, Madeleine Jackson, JANET  •  warfarin (COUMADIN) tablet 5 mg, 5 mg, Oral, Daily, Waqar Choudhury MD, 5 mg at 05/20/18 5069     ASSESSMENT  Acute on chronic combined systolic diastolic CHF  Nonischemic cardiomyopathy  Hypertension  Hyperlipidemia  Gastroesophageal reflux disease  Severe  nonproductive cough      PLAN  CPM  Diuresis and dobutamine   Check respiratory viral panel  Symptomatic treatment for cough  Continue home medications  Supplement oxygen nebulizer and Mucinex  Stress ulcer DVT prophylaxis  Supportive care  Symptomatic treatment for cough   Will follow with Dr. MANDY ART MD

## 2018-05-22 LAB
ANION GAP SERPL CALCULATED.3IONS-SCNC: 14.3 MMOL/L
BUN BLD-MCNC: 19 MG/DL (ref 6–20)
BUN/CREAT SERPL: 12.8 (ref 7–25)
CALCIUM SPEC-SCNC: 8.7 MG/DL (ref 8.6–10.5)
CHLORIDE SERPL-SCNC: 97 MMOL/L (ref 98–107)
CO2 SERPL-SCNC: 25.7 MMOL/L (ref 22–29)
CREAT BLD-MCNC: 1.49 MG/DL (ref 0.76–1.27)
GFR SERPL CREATININE-BSD FRML MDRD: 59 ML/MIN/1.73
GLUCOSE BLD-MCNC: 121 MG/DL (ref 65–99)
INR PPP: 1.81 (ref 0.9–1.1)
POTASSIUM BLD-SCNC: 3.9 MMOL/L (ref 3.5–5.2)
PROTHROMBIN TIME: 20.7 SECONDS (ref 11.7–14.2)
SODIUM BLD-SCNC: 137 MMOL/L (ref 136–145)

## 2018-05-22 PROCEDURE — 99233 SBSQ HOSP IP/OBS HIGH 50: CPT | Performed by: INTERNAL MEDICINE

## 2018-05-22 PROCEDURE — 94799 UNLISTED PULMONARY SVC/PX: CPT

## 2018-05-22 PROCEDURE — 25010000002 FUROSEMIDE PER 20 MG: Performed by: INTERNAL MEDICINE

## 2018-05-22 PROCEDURE — 85610 PROTHROMBIN TIME: CPT | Performed by: INTERNAL MEDICINE

## 2018-05-22 PROCEDURE — 80048 BASIC METABOLIC PNL TOTAL CA: CPT | Performed by: HOSPITALIST

## 2018-05-22 RX ORDER — WARFARIN SODIUM 2.5 MG/1
2.5 TABLET ORAL
Status: COMPLETED | OUTPATIENT
Start: 2018-05-22 | End: 2018-05-22

## 2018-05-22 RX ORDER — DIGOXIN 250 MCG
250 TABLET ORAL
Status: DISCONTINUED | OUTPATIENT
Start: 2018-05-22 | End: 2018-05-23 | Stop reason: HOSPADM

## 2018-05-22 RX ORDER — SPIRONOLACTONE 25 MG/1
25 TABLET ORAL DAILY
Status: DISCONTINUED | OUTPATIENT
Start: 2018-05-23 | End: 2018-05-23 | Stop reason: HOSPADM

## 2018-05-22 RX ORDER — FUROSEMIDE 40 MG/1
40 TABLET ORAL
Status: DISCONTINUED | OUTPATIENT
Start: 2018-05-22 | End: 2018-05-23 | Stop reason: HOSPADM

## 2018-05-22 RX ADMIN — WARFARIN SODIUM 2.5 MG: 2.5 TABLET ORAL at 18:10

## 2018-05-22 RX ADMIN — SPIRONOLACTONE 12.5 MG: 25 TABLET ORAL at 08:59

## 2018-05-22 RX ADMIN — GUAIFENESIN 1200 MG: 600 TABLET, EXTENDED RELEASE ORAL at 20:12

## 2018-05-22 RX ADMIN — ATORVASTATIN CALCIUM 10 MG: 10 TABLET, FILM COATED ORAL at 20:12

## 2018-05-22 RX ADMIN — POTASSIUM CHLORIDE 20 MEQ: 750 CAPSULE, EXTENDED RELEASE ORAL at 11:33

## 2018-05-22 RX ADMIN — IPRATROPIUM BROMIDE AND ALBUTEROL SULFATE 3 ML: .5; 3 SOLUTION RESPIRATORY (INHALATION) at 23:39

## 2018-05-22 RX ADMIN — IPRATROPIUM BROMIDE AND ALBUTEROL SULFATE 3 ML: .5; 3 SOLUTION RESPIRATORY (INHALATION) at 19:39

## 2018-05-22 RX ADMIN — METOPROLOL SUCCINATE 100 MG: 100 TABLET, FILM COATED, EXTENDED RELEASE ORAL at 20:12

## 2018-05-22 RX ADMIN — FUROSEMIDE 40 MG: 40 TABLET ORAL at 18:09

## 2018-05-22 RX ADMIN — FUROSEMIDE 40 MG: 10 INJECTION, SOLUTION INTRAMUSCULAR; INTRAVENOUS at 08:59

## 2018-05-22 RX ADMIN — IPRATROPIUM BROMIDE AND ALBUTEROL SULFATE 3 ML: .5; 3 SOLUTION RESPIRATORY (INHALATION) at 03:44

## 2018-05-22 RX ADMIN — IPRATROPIUM BROMIDE AND ALBUTEROL SULFATE 3 ML: .5; 3 SOLUTION RESPIRATORY (INHALATION) at 15:09

## 2018-05-22 RX ADMIN — DIGOXIN 250 MCG: 250 TABLET ORAL at 12:32

## 2018-05-22 RX ADMIN — POTASSIUM CHLORIDE 20 MEQ: 750 CAPSULE, EXTENDED RELEASE ORAL at 18:09

## 2018-05-22 RX ADMIN — POTASSIUM CHLORIDE 20 MEQ: 750 CAPSULE, EXTENDED RELEASE ORAL at 09:00

## 2018-05-22 RX ADMIN — PANTOPRAZOLE SODIUM 40 MG: 40 TABLET, DELAYED RELEASE ORAL at 09:02

## 2018-05-22 RX ADMIN — PANTOPRAZOLE SODIUM 40 MG: 40 TABLET, DELAYED RELEASE ORAL at 18:09

## 2018-05-22 RX ADMIN — GUAIFENESIN 1200 MG: 600 TABLET, EXTENDED RELEASE ORAL at 09:00

## 2018-05-22 RX ADMIN — WARFARIN SODIUM 5 MG: 5 TABLET ORAL at 18:10

## 2018-05-22 RX ADMIN — METOPROLOL SUCCINATE 100 MG: 100 TABLET, FILM COATED, EXTENDED RELEASE ORAL at 09:00

## 2018-05-22 NOTE — PROGRESS NOTES
Discharge Planning Assessment   [facility]     Patient Name: Hernando Caro  MRN: [id number]  Today's Date: 5/22/2018    Admit Date: 5/19/2018          Discharge Needs Assessment     Row Name 05/22/18 7518       Living Environment    Lives With significant other    Current Living Arrangements home/apartment/condo    Primary Care Provided by self    Provides Primary Care For no one    Family Caregiver if Needed significant other   Mara Hillman [phone]    Able to Return to Prior Arrangements yes       Resource/Environmental Concerns    Resource/Environmental Concerns financial   med assist met [facility] pt, He does not qualify for medicaid    Financial Concerns insurance, none;medicine, unable to afford    Transportation Concerns car, none       Transition Planning    Patient/Family Anticipates Transition to home    Patient/Family Anticipated Services at Transition none    Transportation Anticipated family or friend will provide       Discharge Needs Assessment    Readmission Within the Last 30 Days no previous admission in last 30 days    Concerns to be Addressed financial/insurance    Equipment Currently Used at Home bipap/cpap   from [facility] has had 2 weeks    Anticipated Changes Related to Illness none    Equipment Needed After Discharge none            Discharge Plan     Row Name 05/22/18 5894       Plan    Plan Home, may need assistance with medications    Plan Comments Met at bedside [facility] pt who lives with his significant other Mara [phone]. Per pt his employer has recently changed ownership and at this time is not providing his insurance. This may change in July. Currently has no insurance. Med assist met with pt and he does not meet criteria for medicaid. He was given a hospital assistance program packet that he can fill out. Pt will not have any medication coverage. [facility] will work with pt and provide coupons and could pay for his medication at [place] if needed. Pt PCP is Jerald Sebastian and pharmacy is  [facility]. CCP will follow...[facility]        Destination     No service coordination in this encounter.      Durable Medical Equipment     No service coordination in this encounter.      Dialysis/Infusion     No service coordination in this encounter.      Home Medical Care     No service coordination in this encounter.      Social Care     No service coordination in this encounter.                Demographic Summary     Row Name 05/22/18 1351       General Information    Admission Type inpatient    Arrived From home    Referral Source admission list    Reason for Consult discharge planning    Preferred Language English            Functional Status     Row Name 05/22/18 1351       Functional Status    Usual Activity Tolerance good    Current Activity Tolerance good       Functional Status, IADL    Medications independent    Meal Preparation independent    Housekeeping independent    Laundry independent    Shopping independent       Mental Status    General Appearance WDL WDL       Mental Status Summary    Recent Changes in Mental Status/Cognitive Functioning no changes            Psychosocial    No documentation.           Abuse/Neglect    No documentation.           Legal    No documentation.           Substance Abuse    No documentation.           Patient Forms    No documentation.         Rina Forbes RN

## 2018-05-22 NOTE — PROGRESS NOTES
Hospital Follow Up    LOS:  LOS: 3 days   Patient Name: Hernando Caro  Age/Sex: 57 y.o. male  : 1960  MRN: [id number]    Day of Service: 18   Length of Stay: 3  Encounter Provider: Amrit Mast MD  Place of Service: [facility] CARDIOLOGY  Patient Care Team:  Jerald Sebastian MD as PCP - General (Internal Medicine)  Jerald Sebastian MD as PCP - Family Medicine  Jerald Sebastian MD    Subjective:     Chief Complaint: Congestive heart failure.    Interval History: Patient feels better.  Voice is still hoarse his heart rate is still elevated.    Objective:     Objective:  Temp:  [97.9 °F (36.6 °C)-98.3 °F (36.8 °C)] 98.3 °F (36.8 °C)  Heart Rate:  [] 123  Resp:  [16-20] 16  BP: (100-118)/(68-95) 100/82     Intake/Output Summary (Last 24 hours) at 18 0937  Last data filed at 18 0723   Gross per 24 hour   Intake             1910 ml   Output             4550 ml   Net            -2640 ml     Body mass index is 23.9 kg/m².  1    18  0738 18  1103 18  0645   Weight: 91.6 kg (202 lb) 92.4 kg (203 lb 9.6 oz) 86.7 kg (191 lb 3.2 oz)     Weight change:       Physical Exam:   General : Alert, cooperative, in no acute distress.  Neuro: alert,cooperative and oriented  Lungs: CTAB. Normal respiratory effort and rate.  CV:: irregular rate and rhythm, normal S1 and S2, no murmurs, gallops or rubs.  ABD: Soft, nontender, non-distended. positive bowel sounds  Extr: No edema or cyanosis, moves all extremities    Lab Review:     Results from last 7 days  Lab Units 18  0411 18  0443 18  0533 18  0800   SODIUM mmol/L 137 137 138 130*   POTASSIUM mmol/L 3.9 3.4* 3.7 4.0   CHLORIDE mmol/L 97* 96* 100 93*   CO2 mmol/L 25.7 25.1 24.1 22.4   BUN mg/dL 19 19 19 19   CREATININE mg/dL 1.49* 1.60* 1.67* 1.60*   GLUCOSE mg/dL 121* 149* 110* 110*   CALCIUM mg/dL 8.7 8.6 8.9 9.5   AST (SGOT) U/L  --   --  28 35   ALT (SGPT) U/L  --   --  24  29       Results from last 7 days  Lab Units 05/20/18  0533 05/19/18  0800   TROPONIN T ng/mL 0.032* 0.052*       Results from last 7 days  Lab Units 05/20/18  0533 05/19/18  0800   WBC 10*3/mm3 5.27 5.26   HEMOGLOBIN g/dL 12.4* 13.1*   HEMATOCRIT % 37.5* 38.6*   PLATELETS 10*3/mm3 302 297       Results from last 7 days  Lab Units 05/22/18  0411 05/21/18  0443   INR  1.81* 1.96*           Results from last 7 days  Lab Units 05/20/18  0533   CHOLESTEROL mg/dL 86   TRIGLYCERIDES mg/dL 52   HDL CHOL mg/dL 33*       Results from last 7 days  Lab Units 05/21/18  0443 05/20/18  1250 05/20/18  0533 05/19/18  0800   PROBNP pg/mL 2,440.0* 3,300.0* 4,208.0* 5,874.0*       Results from last 7 days  Lab Units 05/20/18  0533   TSH mIU/mL 1.790     I reviewed the patient's new clinical results.  I personally viewed and interpreted the patient's EKG  Current Medications:   Scheduled Meds:  atorvastatin 10 mg Oral Nightly   digoxin 125 mcg Oral Daily   furosemide 40 mg Intravenous TID   guaiFENesin 1,200 mg Oral Q12H   ipratropium-albuterol 3 mL Nebulization Q4H - RT   metoprolol succinate  mg Oral Q12H   pantoprazole 40 mg Oral BID AC   potassium chloride 20 mEq Oral TID With Meals   spironolactone 12.5 mg Oral Daily   warfarin 5 mg Oral Daily     Continuous Infusions:     Allergies:  Allergies   Allergen Reactions   • Ace Inhibitors    • Lisinopril Angioedema       Assessment:     Active Problems:    Acute on chronic combined systolic and diastolic congestive heart failure        Plan:      1.  Acute on chronic congestive heart failure.  He diuresed nicely on IV diuretics and dopamine.  It was discontinued he says he feels good.  We'll start by mouth medications today.  He is allergic to ace inhibitors with angioedema.  2.  Atrial fibrillation heart rate is still high we'll attempt to control by adjusting his medications.  We'll double his digoxin he is on high-dose beta blocker.  3.  Creatinine better today.  4.  Also  discussed with patient disability he wants to go back to work but clearly he continues to have recurrent episodes     Amrit Mast MD  05/22/18  9:37 AM

## 2018-05-22 NOTE — PLAN OF CARE
Problem: Patient Care Overview  Goal: Plan of Care Review  Outcome: Ongoing (interventions implemented as appropriate)   05/22/18 0596   Coping/Psychosocial   Plan of Care Reviewed With patient   Plan of Care Review   Progress improving   OTHER   Outcome Summary Pt has elevated heart rate throughout shift. MD aware. Digoxin increased per MD as well as metoprolol. Tolerating well. Pt ambulated unassisted with steady gait. Pt changed to PO Lasix with anticipation of discharge soon. Will continue to monitor.

## 2018-05-22 NOTE — CONSULTS
"Adult Nutrition  Assessment/PES    Patient Name:  Hernando Caro  YOB: 1960  MRN: [id number]  Admit Date:  5/19/2018    Assessment Date:  5/22/2018    Comments:  Consulted for diet education for CHF management. Low sodium diet reviewed & info given to patient. RD contact info provided.           Adult Nutrition Assessment     Row Name 05/22/18 1100       Reason for Assessment    Reason For Assessment nurse/nurse practitioner consult    Diagnosis cardiac disease   CHF    Identified At Risk by Screening Criteria need for education       Nutrition/Diet History    Typical Food/Fluid Intake Likes to oven garzon chicken or pork; does not eat out often or at all. Does not add salt to foods.        Anthropometrics    Height 190.5 cm (75\")       Admit Weight    Admit Weight 92.1 kg (203 lb)   current 187 lb       Ideal Body Weight (IBW)    Ideal Body Weight (IBW) (kg) 90.45       Usual Body Weight (UBW)    Usual Body Weight --   lower 200's        IBW Adjustment, Para/Tetraplegia    5% Adjustment, Para (IBW) 85.93    10% Adjustment, Para (IBW) 81.4    10% Adjustment, Tetra (IBW) 81.4    15% Adjustment, Tetra (IBW) 76.88       Labs/Procedures/Meds    Lab Results Reviewed reviewed       Diagnostic Tests/Procedures    Diagnostic Test/Procedure Reviewed reviewed       Medications    Pertinent Medications Reviewed reviewed    Pertinent Medications Comments diuretic, ppi, coumadin       Physical Findings    Skin --   intact       Nutrition Prescription PO    Common Modifiers Renal;Cardiac;Consistent Carbohydrate       PO Evaluation    Number of Meals 2    % PO Intake 100    Row Name 05/22/18 0704       Anthropometrics    Weight 84.8 kg (187 lb)          Problem/Interventions:        Problem 1     Row Name 05/22/18 1118       Nutrition Diagnoses Problem 1    Problem 1 Knowledge Deficit    Etiology (related to) Medical Diagnosis    Cardiac CHF    Signs/Symptoms (evidenced by) Reported  Information Deficit               "      Intervention Goal     Row Name 05/22/18 1118       Intervention Goal    General Provide information regarding MNT for treatment/condition;Disease management/therapy                Education/Evaluation     Row Name 05/22/18 1118       Education    Education Provided education regarding;Education topics;Advised regarding habits/behavior    Provided education regarding Diet rationale    Education Topics CHF;Na+    Na+ (mg/day) 2000 mg/day    Advised Regarding Habits/Behavior Food choices;Food prep;Food shopping;Label reading;Meal planning;Seasoning food       Monitor/Evaluation    Monitor Per protocol    Education Follow-up Reinforce PRN        Electronically signed by:  Olesya Jurado RD  05/22/18 11:19 AM

## 2018-05-22 NOTE — PROGRESS NOTES
"Daily progress note    Chief complaint  Doing little better with cough  No other complaints    History of present illness  57-year-old -American male with history of cardiomyopathy congestive heart failure chronic kidney disease hypertension chronic atrial fibrillation admitted to cardiology service from ER with shortness of breath for last 2 days.  Patient denies any chest pain.  Patient does have nonproductive cough.  Patient evaluated in ER found to be in decompensated heart failure admitted for management.  I'm asked to follow the patient for medical problem.    REVIEW OF SYSTEMS  Review of Systems   Constitutional: Negative.  Negative for chills and fever.   HENT: Negative.  Negative for sore throat.    Eyes: Negative.    Respiratory: Positive for cough and shortness of breath.    Cardiovascular: Negative.  Negative for chest pain.   Gastrointestinal: Negative.  Negative for nausea and vomiting.   Genitourinary: Negative.  Negative for dysuria.   Musculoskeletal: Negative.  Negative for back pain.   Skin: Negative.  Negative for rash.   Neurological: Negative.  Negative for headaches.   All other systems reviewed and are negative.     PHYSICAL EXAM  Blood pressure 97/72, pulse 110, temperature 97.9 °F (36.6 °C), temperature source Oral, resp. rate 20, height 190.5 cm (75\"), weight 84.8 kg (187 lb), SpO2 96 %.    Constitutional: He is oriented to person, place, and time and well-developed, well-nourished, and in no distress.   Head: Normocephalic and atraumatic.   Eyes: EOM are normal. Pupils are equal, round, and reactive to light.   Neck: Normal range of motion. Neck supple. JVD (bilaterally, sitting upright ) present.   Cardiovascular: Regular rhythm, normal heart sounds and intact distal pulses.  Tachycardia present.    Pulmonary/Chest: Effort normal and breath sounds normal. No respiratory distress. He has no wheezes. He has no rales.   Decreased breath sounds bilaterally    Abdominal: Soft. There is " no tenderness. There is no rebound and no guarding.   Musculoskeletal: Normal range of motion. He exhibits edema (moderate ).   Neurological: He is alert and oriented to person, place, and time. He has normal sensation and normal strength.   Skin: Skin is warm and dry.   Psychiatric: Mood and affect normal.     LAB RESULTS  Lab Results (last 24 hours)     Procedure Component Value Units Date/Time    Basic Metabolic Panel [[id number]]  (Abnormal) Collected:  05/22/18 0411    Specimen:  Blood Updated:  05/22/18 0506     Glucose 121 (H) mg/dL      BUN 19 mg/dL      Creatinine 1.49 (H) mg/dL      Sodium 137 mmol/L      Potassium 3.9 mmol/L      Chloride 97 (L) mmol/L      CO2 25.7 mmol/L      Calcium 8.7 mg/dL      eGFR   Amer 59 (L) mL/min/1.73      BUN/Creatinine Ratio 12.8     Anion Gap 14.3 mmol/L     Narrative:       GFR Normal >60  Chronic Kidney Disease <60  Kidney Failure <15    Protime-INR [[id number]]  (Abnormal) Collected:  05/22/18 0411    Specimen:  Blood Updated:  05/22/18 0459     Protime 20.7 (H) Seconds      INR 1.81 (H)    Respiratory Panel, PCR - Swab, Nasopharynx [[id number]]  (Normal) Collected:  05/21/18 1940    Specimen:  Swab from Nasopharynx Updated:  05/21/18 2157     ADENOVIRUS, PCR Not Detected     Coronavirus 229E Not Detected     Coronavirus HKU1 Not Detected     Coronavirus NL63 Not Detected     Coronavirus OC43 Not Detected     Human Metapneumovirus Not Detected     Human Rhinovirus/Enterovirus Not Detected     Influenza B PCR Not Detected     Parainfluenza Virus 1 Not Detected     Parainfluenza Virus 2 Not Detected     Parainfluenza Virus 3 Not Detected     Parainfluenza Virus 4 Not Detected     Bordetella pertussis pcr Not Detected     Influenza A H1 2009 PCR Not Detected     Chlamydophila pneumoniae PCR Not Detected     Mycoplasma pneumo by PCR Not Detected     Influenza A PCR Not Detected     Influenza A H3 Not Detected     Influenza A H1 Not Detected     RSV, PCR Not Detected         Imaging Results (last 24 hours)     ** No results found for the last 24 hours. **        EKG                              Rhythm/Rate: sinus tachycardia at 120  P waves and NE: normal  QRS, axis: normal axis, LVH   ST and T waves: lateral ST changes        Current Facility-Administered Medications:   •  atorvastatin (LIPITOR) tablet 10 mg, 10 mg, Oral, Nightly, Kamran Barba MD, 10 mg at 05/21/18 2048  •  digoxin (LANOXIN) tablet 250 mcg, 250 mcg, Oral, Daily, Amrit Mast MD, 250 mcg at 05/22/18 1232  •  furosemide (LASIX) tablet 40 mg, 40 mg, Oral, BID, Amrit Mast MD  •  guaiFENesin (MUCINEX) 12 hr tablet 1,200 mg, 1,200 mg, Oral, Q12H, Kamran Barba MD, 1,200 mg at 05/22/18 0900  •  HYDROcod Polst-CPM Polst ER (TUSSIONEX PENNKINETIC) 10-8 MG/5ML ER suspension 10 mL, 10 mL, Oral, Q12H PRN, Kamran Barba MD, 10 mL at 05/21/18 1659  •  ipratropium-albuterol (DUO-NEB) nebulizer solution 3 mL, 3 mL, Nebulization, Q4H - RT, Kamran Barba MD, 3 mL at 05/22/18 0344  •  metoprolol succinate XL (TOPROL-XL) 24 hr tablet 100 mg, 100 mg, Oral, Q12H, JANET Bose, 100 mg at 05/22/18 0900  •  nitroglycerin (NITROSTAT) SL tablet 0.4 mg, 0.4 mg, Sublingual, Q5 Min PRN, Waqar Choudhury MD  •  pantoprazole (PROTONIX) EC tablet 40 mg, 40 mg, Oral, BID AC, Kamran Barba MD, 40 mg at 05/22/18 0902  •  potassium chloride (MICRO-K) CR capsule 20 mEq, 20 mEq, Oral, TID With Meals, Kamran Barba MD, 20 mEq at 05/22/18 1133  •  sodium chloride (OCEAN) nasal spray 2 spray, 2 spray, Each Nare, PRN, Amrit Mast MD, 2 spray at 05/21/18 1250  •  sodium chloride 0.9 % flush 1-10 mL, 1-10 mL, Intravenous, PRN, Waqar Choudhury MD, 10 mL at 05/21/18 0840  •  Insert peripheral IV, , , Once **AND** sodium chloride 0.9 % flush 10 mL, 10 mL, Intravenous, PRN, Bebeto Paz MD, 10 mL at 05/20/18 1500  •  [START ON 5/23/2018] spironolactone (ALDACTONE) tablet 25 mg, 25 mg, Oral, Daily, Amrit Mast MD  •   warfarin (COUMADIN) tablet 5 mg, 5 mg, Oral, Daily, Waqar Choudhury MD, 5 mg at 05/21/18 1936     ASSESSMENT  Acute on chronic combined systolic diastolic CHF  Nonischemic cardiomyopathy  Hypertension  Hyperlipidemia  Gastroesophageal reflux disease  Severe nonproductive cough      PLAN  CPM  Diuresis and dobutamine per cardiology  Symptomatic treatment for cough  Continue home medications  Supplement oxygen nebulizer and Mucinex  Stress ulcer DVT prophylaxis  Supportive care  Will follow with Dr. MANDY ART MD

## 2018-05-22 NOTE — PLAN OF CARE
Problem: Patient Care Overview  Goal: Plan of Care Review  Outcome: Ongoing (interventions implemented as appropriate)   05/22/18 0525   Coping/Psychosocial   Plan of Care Reviewed With patient   Plan of Care Review   Progress improving   OTHER   Outcome Summary HR still elevated in the 110's a good portion of the nx. Pt afib at times and sinus tachy at other times. Dobutamine drip stopped. Pt has no c.o pain or nausea. K replaced during nx. Will continue to monitor.      Goal: Individualization and Mutuality  Outcome: Ongoing (interventions implemented as appropriate)    Goal: Discharge Needs Assessment  Outcome: Ongoing (interventions implemented as appropriate)    Goal: Interprofessional Rounds/Family Conf  Outcome: Ongoing (interventions implemented as appropriate)      Problem: Fluid Volume Excess (Adult)  Goal: Optimal Fluid Balance  Outcome: Ongoing (interventions implemented as appropriate)      Problem: Cardiac: Heart Failure (Adult)  Goal: Signs and Symptoms of Listed Potential Problems Will be Absent, Minimized or Managed (Cardiac: Heart Failure)  Outcome: Ongoing (interventions implemented as appropriate)

## 2018-05-23 VITALS
HEART RATE: 118 BPM | DIASTOLIC BLOOD PRESSURE: 80 MMHG | BODY MASS INDEX: 23.82 KG/M2 | OXYGEN SATURATION: 98 % | SYSTOLIC BLOOD PRESSURE: 107 MMHG | WEIGHT: 191.6 LBS | TEMPERATURE: 97.5 F | RESPIRATION RATE: 16 BRPM | HEIGHT: 75 IN

## 2018-05-23 LAB
ANION GAP SERPL CALCULATED.3IONS-SCNC: 14 MMOL/L
BUN BLD-MCNC: 24 MG/DL (ref 6–20)
BUN/CREAT SERPL: 15.9 (ref 7–25)
CALCIUM SPEC-SCNC: 8.6 MG/DL (ref 8.6–10.5)
CHLORIDE SERPL-SCNC: 95 MMOL/L (ref 98–107)
CO2 SERPL-SCNC: 24 MMOL/L (ref 22–29)
CREAT BLD-MCNC: 1.51 MG/DL (ref 0.76–1.27)
GFR SERPL CREATININE-BSD FRML MDRD: 58 ML/MIN/1.73
GLUCOSE BLD-MCNC: 93 MG/DL (ref 65–99)
INR PPP: 2.13 (ref 0.9–1.1)
NT-PROBNP SERPL-MCNC: 5487 PG/ML (ref 5–900)
POTASSIUM BLD-SCNC: 4.3 MMOL/L (ref 3.5–5.2)
PROTHROMBIN TIME: 23.5 SECONDS (ref 11.7–14.2)
SODIUM BLD-SCNC: 133 MMOL/L (ref 136–145)

## 2018-05-23 PROCEDURE — 85610 PROTHROMBIN TIME: CPT | Performed by: INTERNAL MEDICINE

## 2018-05-23 PROCEDURE — 83880 ASSAY OF NATRIURETIC PEPTIDE: CPT | Performed by: HOSPITALIST

## 2018-05-23 PROCEDURE — 94799 UNLISTED PULMONARY SVC/PX: CPT

## 2018-05-23 PROCEDURE — 80048 BASIC METABOLIC PNL TOTAL CA: CPT | Performed by: HOSPITALIST

## 2018-05-23 PROCEDURE — 99239 HOSP IP/OBS DSCHRG MGMT >30: CPT | Performed by: INTERNAL MEDICINE

## 2018-05-23 RX ORDER — IPRATROPIUM BROMIDE AND ALBUTEROL SULFATE 2.5; .5 MG/3ML; MG/3ML
3 SOLUTION RESPIRATORY (INHALATION) EVERY 4 HOURS PRN
Qty: 360 ML | Refills: 2 | Status: SHIPPED | OUTPATIENT
Start: 2018-05-23 | End: 2018-09-18

## 2018-05-23 RX ORDER — DIGOXIN 250 MCG
250 TABLET ORAL
Qty: 30 TABLET | Refills: 3 | Status: SHIPPED | OUTPATIENT
Start: 2018-05-23 | End: 2018-09-25

## 2018-05-23 RX ORDER — ATORVASTATIN CALCIUM 10 MG/1
10 TABLET, FILM COATED ORAL NIGHTLY
Qty: 30 TABLET | Refills: 3 | Status: SHIPPED | OUTPATIENT
Start: 2018-05-23 | End: 2018-12-10 | Stop reason: SDUPTHER

## 2018-05-23 RX ORDER — METOPROLOL SUCCINATE 100 MG/1
100 TABLET, EXTENDED RELEASE ORAL EVERY 12 HOURS SCHEDULED
Qty: 60 TABLET | Refills: 3 | Status: SHIPPED | OUTPATIENT
Start: 2018-05-23 | End: 2018-10-12 | Stop reason: SDUPTHER

## 2018-05-23 RX ORDER — IPRATROPIUM BROMIDE AND ALBUTEROL SULFATE 2.5; .5 MG/3ML; MG/3ML
3 SOLUTION RESPIRATORY (INHALATION) EVERY 4 HOURS PRN
COMMUNITY
End: 2018-06-12 | Stop reason: ALTCHOICE

## 2018-05-23 RX ORDER — IPRATROPIUM BROMIDE AND ALBUTEROL SULFATE 2.5; .5 MG/3ML; MG/3ML
3 SOLUTION RESPIRATORY (INHALATION)
Qty: 360 ML | Refills: 1 | Status: SHIPPED | OUTPATIENT
Start: 2018-05-23

## 2018-05-23 RX ADMIN — FUROSEMIDE 40 MG: 40 TABLET ORAL at 07:51

## 2018-05-23 RX ADMIN — DIGOXIN 250 MCG: 250 TABLET ORAL at 13:14

## 2018-05-23 RX ADMIN — IPRATROPIUM BROMIDE AND ALBUTEROL SULFATE 3 ML: .5; 3 SOLUTION RESPIRATORY (INHALATION) at 03:17

## 2018-05-23 RX ADMIN — IPRATROPIUM BROMIDE AND ALBUTEROL SULFATE 3 ML: .5; 3 SOLUTION RESPIRATORY (INHALATION) at 15:12

## 2018-05-23 RX ADMIN — METOPROLOL SUCCINATE 100 MG: 100 TABLET, FILM COATED, EXTENDED RELEASE ORAL at 07:51

## 2018-05-23 RX ADMIN — IPRATROPIUM BROMIDE AND ALBUTEROL SULFATE 3 ML: .5; 3 SOLUTION RESPIRATORY (INHALATION) at 11:53

## 2018-05-23 RX ADMIN — POTASSIUM CHLORIDE 20 MEQ: 750 CAPSULE, EXTENDED RELEASE ORAL at 13:14

## 2018-05-23 RX ADMIN — SPIRONOLACTONE 25 MG: 25 TABLET ORAL at 07:52

## 2018-05-23 RX ADMIN — GUAIFENESIN 1200 MG: 600 TABLET, EXTENDED RELEASE ORAL at 07:51

## 2018-05-23 RX ADMIN — IPRATROPIUM BROMIDE AND ALBUTEROL SULFATE 3 ML: .5; 3 SOLUTION RESPIRATORY (INHALATION) at 08:41

## 2018-05-23 RX ADMIN — PANTOPRAZOLE SODIUM 40 MG: 40 TABLET, DELAYED RELEASE ORAL at 06:47

## 2018-05-23 RX ADMIN — POTASSIUM CHLORIDE 20 MEQ: 750 CAPSULE, EXTENDED RELEASE ORAL at 07:51

## 2018-05-23 NOTE — PROGRESS NOTES
Device Therapy Screening    Based on device therapy screening, this patient does not meet criteria for consideration of CRT and/or ICD at this time.    Hilda Barnes, APRN  5/23/2018

## 2018-05-23 NOTE — PLAN OF CARE
Problem: Patient Care Overview  Goal: Plan of Care Review  Outcome: Ongoing (interventions implemented as appropriate)   05/23/18 0436   Coping/Psychosocial   Plan of Care Reviewed With patient   Plan of Care Review   Progress improving   OTHER   Outcome Summary pt's HR around 120's most of the time and started to go down to 110's after 2am, current staying between 103-115, no s/s , pt asymptomatic; no c/o pain or SOA, BP stable; no other acute changes; will continue to monitor.     Goal: Individualization and Mutuality  Outcome: Ongoing (interventions implemented as appropriate)    Goal: Discharge Needs Assessment  Outcome: Ongoing (interventions implemented as appropriate)    Goal: Interprofessional Rounds/Family Conf  Outcome: Ongoing (interventions implemented as appropriate)      Problem: Fluid Volume Excess (Adult)  Goal: Optimal Fluid Balance  Outcome: Ongoing (interventions implemented as appropriate)      Problem: Cardiac: Heart Failure (Adult)  Goal: Signs and Symptoms of Listed Potential Problems Will be Absent, Minimized or Managed (Cardiac: Heart Failure)  Outcome: Ongoing (interventions implemented as appropriate)

## 2018-05-23 NOTE — PROGRESS NOTES
Met with patient re: HF management.  He has history of HFrEF (30%), RVSP 46 mm Hg, atrial fib with elevated rate (on anticoagulation).  He was admitted with increasing SOA, LE edema, cough, fatigue.  He has been following frequently in Dr. Mast's office.  He lives with his fiancee; has been unable to work due to his heart condition.  He states he currently has no insurance and has concern about paying for his medications.  He is feeling very stressed about his financial situation - recently bought a house and a new car.  He is behind on his car insurance; he feels he will difficulty getting to doctor's appointments because he won't be driving.  He states Dr. Mast has suggested he file for disability, and he is thinking about taking this route.  He has a scale at home and weighs himself daily.  He admits he is not as careful about his sodium intake as he could be.  He uses O2 at HS at home.  He is feeling better, no SOA with activity, swelling is down.  Cough is better, he thinks the nebulizer really helped him.  His HR is 120 at rest, but he denies any feeling of palpitation.  Exam: Lungs clear bilat; cardiac reg rhythm, increased rate, no MGR; ext trace edema.  Reviewed and reinforced importance of daily weight monitoring and when to contact provider.  Also reviewed signs and symptoms of worsening HF to watch for.  Briefly discussed strategies to decrease sodium intake.  He thinks he will be going home today.  Stressed importance of timely follow up in the cardiology office.  [facility] been working with him to get medications at an affordable price.  Gave him a copy of the HF booklet, as well as [employer] magnet.  Reviewed general HF management strategies.  He certainly is at high risk for readmission, given not only his cardiac issues, but also his stressful social situation.

## 2018-05-23 NOTE — PROGRESS NOTES
Hospital Follow Up    LOS:  LOS: 4 days   Patient Name: Hernando Caro  Age/Sex: 57 y.o. male  : 1960  MRN: [id number]    Day of Service: 18   Length of Stay: 4  Encounter Provider: Amrit Mast MD  Place of Service: [facility] CARDIOLOGY  Patient Care Team:  Jerald Sebastian MD as PCP - General (Internal Medicine)  Jerald Sebastian MD as PCP - Family Medicine  Jerald Sebastian MD    Subjective:     Chief Complaint: CHF.    Interval History: Patient looks good feels good.  Cough has resolved.  Heart rate is a little increased however he's been getting nebulizer treatment which increases his heart rate.    Objective:     Objective:  Temp:  [97.6 °F (36.4 °C)-98.8 °F (37.1 °C)] 98.8 °F (37.1 °C)  Heart Rate:  [105-126] 120  Resp:  [16-20] 16  BP: ()/(72-93) 104/78     Intake/Output Summary (Last 24 hours) at 18 0934  Last data filed at 18 0317   Gross per 24 hour   Intake                0 ml   Output             1150 ml   Net            -1150 ml     Body mass index is 23.95 kg/m².  1    18  0645 18  0704 18  0500   Weight: 86.7 kg (191 lb 3.2 oz) 84.8 kg (187 lb) 86.9 kg (191 lb 9.6 oz)     Weight change:       Physical Exam:   General : Alert, cooperative, in no acute distress.  Neuro: alert,cooperative and oriented  Lungs: CTAB. Normal respiratory effort and rate.  CV:: irregular rate and rhythm, normal S1 and S2, no murmurs, gallops or rubs.  ABD: Soft, nontender, non-distended. positive bowel sounds  Extr: No edema or cyanosis, moves all extremities    Lab Review:     Results from last 7 days  Lab Units 18  0421 18  0411  18  0533 18  0800   SODIUM mmol/L 133* 137  < > 138 130*   POTASSIUM mmol/L 4.3 3.9  < > 3.7 4.0   CHLORIDE mmol/L 95* 97*  < > 100 93*   CO2 mmol/L 24.0 25.7  < > 24.1 22.4   BUN mg/dL 24* 19  < > 19 19   CREATININE mg/dL 1.51* 1.49*  < > 1.67* 1.60*   GLUCOSE mg/dL 93 121*  < > 110*  110*   CALCIUM mg/dL 8.6 8.7  < > 8.9 9.5   AST (SGOT) U/L  --   --   --  28 35   ALT (SGPT) U/L  --   --   --  24 29   < > = values in this interval not displayed.    Results from last 7 days  Lab Units 05/20/18  0533 05/19/18  0800   TROPONIN T ng/mL 0.032* 0.052*       Results from last 7 days  Lab Units 05/20/18  0533 05/19/18  0800   WBC 10*3/mm3 5.27 5.26   HEMOGLOBIN g/dL 12.4* 13.1*   HEMATOCRIT % 37.5* 38.6*   PLATELETS 10*3/mm3 302 297       Results from last 7 days  Lab Units 05/23/18  0421 05/22/18  0411   INR  2.13* 1.81*           Results from last 7 days  Lab Units 05/20/18  0533   CHOLESTEROL mg/dL 86   TRIGLYCERIDES mg/dL 52   HDL CHOL mg/dL 33*       Results from last 7 days  Lab Units 05/23/18  0421 05/21/18  0443 05/20/18  1250 05/20/18  0533 05/19/18  0800   PROBNP pg/mL 5,487.0* 2,440.0* 3,300.0* 4,208.0* 5,874.0*       Results from last 7 days  Lab Units 05/20/18  0533   TSH mIU/mL 1.790     Current Medications:   Scheduled Meds:  atorvastatin 10 mg Oral Nightly   digoxin 250 mcg Oral Daily   furosemide 40 mg Oral BID   guaiFENesin 1,200 mg Oral Q12H   ipratropium-albuterol 3 mL Nebulization Q4H - RT   metoprolol succinate  mg Oral Q12H   pantoprazole 40 mg Oral BID AC   potassium chloride 20 mEq Oral TID With Meals   spironolactone 25 mg Oral Daily   warfarin 5 mg Oral Daily     Continuous Infusions:     Allergies:  Allergies   Allergen Reactions   • Ace Inhibitors    • Lisinopril Angioedema       Assessment:     Active Problems:    Acute on chronic combined systolic and diastolic congestive heart failure        Plan:      1.  Congestive heart failure.  Patient appears to be back to baseline.  Doing well clinically.  2.  Atrial fibrillation his heart rate is up partially due to dual nebulizer.  Will change to an MDI as an outpatient which will help with his heart rate.  3.  We'll plan discharge today follow-up in one week with nurse practitioner 4 weeks with myself.     Amrit ARCOS  MD Pro  05/23/18  9:34 AM

## 2018-05-23 NOTE — PROGRESS NOTES
"Daily progress note    Chief complaint  Doing better  No other complaints    History of present illness  57-year-old -American male with history of cardiomyopathy congestive heart failure chronic kidney disease hypertension chronic atrial fibrillation admitted to cardiology service from ER with shortness of breath for last 2 days.  Patient denies any chest pain.  Patient does have nonproductive cough.  Patient evaluated in ER found to be in decompensated heart failure admitted for management.  I'm asked to follow the patient for medical problem.    REVIEW OF SYSTEMS  Review of Systems   Constitutional: Negative.  Negative for chills and fever.   HENT: Negative.  Negative for sore throat.    Eyes: Negative.    Respiratory: Positive for cough and shortness of breath.    Cardiovascular: Negative.  Negative for chest pain.   Gastrointestinal: Negative.  Negative for nausea and vomiting.   Genitourinary: Negative.  Negative for dysuria.   Musculoskeletal: Negative.  Negative for back pain.   Skin: Negative.  Negative for rash.   Neurological: Negative.  Negative for headaches.   All other systems reviewed and are negative.     PHYSICAL EXAM  Blood pressure 107/80, pulse 120, temperature 97.5 °F (36.4 °C), temperature source Oral, resp. rate 16, height 190.5 cm (75\"), weight 86.9 kg (191 lb 9.6 oz), SpO2 95 %.    Constitutional: He is oriented to person, place, and time and well-developed, well-nourished, and in no distress.   Head: Normocephalic and atraumatic.   Eyes: EOM are normal. Pupils are equal, round, and reactive to light.   Neck: Normal range of motion. Neck supple. JVD (bilaterally, sitting upright ) present.   Cardiovascular: Regular rhythm, normal heart sounds and intact distal pulses.  Tachycardia present.    Pulmonary/Chest: Effort normal and breath sounds normal. No respiratory distress. He has no wheezes. He has no rales.   Decreased breath sounds bilaterally    Abdominal: Soft. There is no " tenderness. There is no rebound and no guarding.   Musculoskeletal: Normal range of motion. He exhibits edema (moderate ).   Neurological: He is alert and oriented to person, place, and time. He has normal sensation and normal strength.   Skin: Skin is warm and dry.   Psychiatric: Mood and affect normal.     LAB RESULTS  Lab Results (last 24 hours)     Procedure Component Value Units Date/Time    BNP [[id number]]  (Abnormal) Collected:  05/23/18 0421    Specimen:  Blood Updated:  05/23/18 0553     proBNP 5,487.0 (H) pg/mL     Narrative:       Among patients with dyspnea, NT-proBNP is highly sensitive for the detection of acute congestive heart failure. In addition NT-proBNP of <300 pg/ml effectively rules out acute congestive heart failure with 99% negative predictive value.    Basic Metabolic Panel [[id number]]  (Abnormal) Collected:  05/23/18 0421    Specimen:  Blood Updated:  05/23/18 0553     Glucose 93 mg/dL      BUN 24 (H) mg/dL      Creatinine 1.51 (H) mg/dL      Sodium 133 (L) mmol/L      Potassium 4.3 mmol/L      Chloride 95 (L) mmol/L      CO2 24.0 mmol/L      Calcium 8.6 mg/dL      eGFR   Amer 58 (L) mL/min/1.73      BUN/Creatinine Ratio 15.9     Anion Gap 14.0 mmol/L     Narrative:       GFR Normal >60  Chronic Kidney Disease <60  Kidney Failure <15    Protime-INR [[id number]]  (Abnormal) Collected:  05/23/18 0421    Specimen:  Blood Updated:  05/23/18 0539     Protime 23.5 (H) Seconds      INR 2.13 (H)        Imaging Results (last 24 hours)     ** No results found for the last 24 hours. **        EKG                              Rhythm/Rate: sinus tachycardia at 120  P waves and VA: normal  QRS, axis: normal axis, LVH   ST and T waves: lateral ST changes        Current Facility-Administered Medications:   •  atorvastatin (LIPITOR) tablet 10 mg, 10 mg, Oral, Nightly, Kamran Barba MD, 10 mg at 05/22/18 2012  •  digoxin (LANOXIN) tablet 250 mcg, 250 mcg, Oral, Daily, Amrit Mast MD, 250 mcg at  05/23/18 1314  •  furosemide (LASIX) tablet 40 mg, 40 mg, Oral, BID, Amrit Mast MD, 40 mg at 05/23/18 0751  •  guaiFENesin (MUCINEX) 12 hr tablet 1,200 mg, 1,200 mg, Oral, Q12H, Kamran Barba MD, 1,200 mg at 05/23/18 0751  •  HYDROcod Polst-CPM Polst ER (TUSSIONEX PENNKINETIC) 10-8 MG/5ML ER suspension 10 mL, 10 mL, Oral, Q12H PRN, Kamran Barba MD, 10 mL at 05/21/18 1659  •  ipratropium-albuterol (DUO-NEB) nebulizer solution 3 mL, 3 mL, Nebulization, Q4H - RT, Kamran Barba MD, 3 mL at 05/23/18 1153  •  metoprolol succinate XL (TOPROL-XL) 24 hr tablet 100 mg, 100 mg, Oral, Q12H, Madeleine Jackson, APRN, 100 mg at 05/23/18 0751  •  nitroglycerin (NITROSTAT) SL tablet 0.4 mg, 0.4 mg, Sublingual, Q5 Min PRN, Waqar Choudhury MD  •  pantoprazole (PROTONIX) EC tablet 40 mg, 40 mg, Oral, BID AC, Kamran Barba MD, 40 mg at 05/23/18 0647  •  potassium chloride (MICRO-K) CR capsule 20 mEq, 20 mEq, Oral, TID With Meals, Kamran Barba MD, 20 mEq at 05/23/18 1314  •  sodium chloride (OCEAN) nasal spray 2 spray, 2 spray, Each Nare, PRN, Amrit Mast MD, 2 spray at 05/21/18 1250  •  sodium chloride 0.9 % flush 1-10 mL, 1-10 mL, Intravenous, PRN, Waqar Choudhury MD, 10 mL at 05/21/18 0840  •  Insert peripheral IV, , , Once **AND** sodium chloride 0.9 % flush 10 mL, 10 mL, Intravenous, PRN, Bebeto Paz MD, 10 mL at 05/20/18 1500  •  spironolactone (ALDACTONE) tablet 25 mg, 25 mg, Oral, Daily, Amrit Mast MD, 25 mg at 05/23/18 4935  •  warfarin (COUMADIN) tablet 5 mg, 5 mg, Oral, Daily, Waqar Choudhury MD, 5 mg at 05/22/18 1810     ASSESSMENT  Acute on chronic combined systolic diastolic CHF  Nonischemic cardiomyopathy  Hypertension  Hyperlipidemia  Gastroesophageal reflux disease  Severe nonproductive cough      PLAN  CPM  Diuresis   Symptomatic treatment for cough  Continue home medications  Supplement oxygen nebulizer and Mucinex  Stress ulcer DVT prophylaxis  Supportive care  Okay to  discharge    TALYA ART MD

## 2018-05-23 NOTE — DISCHARGE SUMMARY
Hernando Caro  [id number]    Date of Admit: 5/19/2018  Date of Discharge:  5/23/2018    Discharge Diagnosis:  Active Hospital Problems (** Indicates Principal Problem)    Diagnosis Date Noted   • Acute on chronic combined systolic and diastolic congestive heart failure [I50.43] 05/19/2018      Resolved Hospital Problems    Diagnosis Date Noted Date Resolved   No resolved problems to display.           Hospital Course:        57-year-old gentleman with a history of cardiomyopathy.  Ejection fraction 30%.  He's had recurrent heart failure.  He presented this time again in heart failure he was started on dobutamine as well as IV diuretics.  He rapidly diuresed.  Echo done showed mild mitral regurgitation and no significant changes ejection fraction.  He also had a persistent cough that responded to DuoNeb.  He was diureses discharged home.  He was also in atrial fibrillation with a rapid rate.  His beta blocker and his digoxin were added/increased.  He'll follow-up with APRN in 7-10 days myself in 4 weeks.  His heart rate was still running a little bit on the high side however for him it was tolerable his INR was therapeutic at 2.1.  Procedures Performed  ECHO 05/19/2018  · Mild mitral valve regurgitation is present  · There is calcification of the aortic valve.  · Moderate to severe tricuspid valve regurgitation is present.  · Mild pulmonic valve regurgitation is present.  · The left ventricular cavity is moderately dilated.  · Right ventricular cavity is moderately dilated.  · Left atrial cavity size is moderately dilated.  · Calculated EF = 30.4%.  · There is no evidence of pericardial effusion       Consults     Date and Time Order Name Status Description    5/19/2018 1553 Inpatient Internal Medicine Consult Completed     5/19/2018 0984 LCG (on-call MD unless specified) Completed           Discharge Medications     Your medication list      START taking these medications      Instructions Last Dose Given Next Dose  Due   ipratropium-albuterol  MCG/ACT inhaler  Commonly known as:  COMBIVENT RESPIMAT      Inhale 1 puff 4 (Four) Times a Day As Needed for Wheezing.       metoprolol succinate  MG 24 hr tablet  Commonly known as:  TOPROL-XL      Take 1 tablet by mouth Every 12 (Twelve) Hours.          CHANGE how you take these medications      Instructions Last Dose Given Next Dose Due   atorvastatin 10 MG tablet  Commonly known as:  LIPITOR  What changed:  · medication strength  · how much to take      Take 1 tablet by mouth Every Night.       digoxin 250 MCG tablet  Commonly known as:  LANOXIN  What changed:  · medication strength  · how much to take      Take 1 tablet by mouth Daily.       furosemide 40 MG tablet  Commonly known as:  LASIX  What changed:  · how much to take  · when to take this      Take 1 tablet by mouth 2 (Two) Times a Day.       warfarin 5 MG tablet  Commonly known as:  COUMADIN  What changed:  additional instructions      Take 1 tablet by mouth Daily.          CONTINUE taking these medications      Instructions Last Dose Given Next Dose Due   potassium chloride 10 MEQ CR tablet  Commonly known as:  K-DUR      Take 1 tablet by mouth Daily.       spironolactone 25 MG tablet  Commonly known as:  ALDACTONE      Take 0.5 tablets by mouth Daily.       tadalafil 20 MG tablet  Commonly known as:  CIALIS      Take 1 tablet by mouth Daily As Needed for erectile dysfunction.          STOP taking these medications    metoprolol tartrate 100 MG tablet  Commonly known as:  LOPRESSOR              Where to Get Your Medications      These medications were sent to [facility]  [address] [address]    Hours:  6:30AM-5PM Mon-Fri Phone:  [phone]   · ipratropium-albuterol  MCG/ACT inhaler     These medications were sent to [facility] [id number] [place] [address] [address] AT [facility](Hikorin Dave[facility]) &  - [phone] [facility] [phone]   [address] [facility] [address]    Phone:  [phone]   · atorvastatin 10 MG tablet  · digoxin 250 MCG tablet  · metoprolol succinate  MG 24 hr tablet         Discharge Diet: Healthy Heart     Activity at Discharge: As tolerated    Discharge disposition: Home    Condition on Discharge: Stable    Follow-up Appointments  Future Appointments  Date Time Provider Department Center   6/12/2018 10:40 AM Amrit Mast MD [facility] None     Additional Instructions for the Follow-ups that You Need to Schedule     Discharge Follow-up with Specialty: Follow up with APRN in our office in 7-10 days and myself in 4 weeks.  Our office will call patient with appt times.  Pt to have INR drawn on Friday in our coumadin clinic; 1 Week    As directed      Specialty:  Follow up with APRN in our office in 7-10 days and myself in 4 weeks.  Our office will call patient with appt times.  Pt to have INR drawn on Friday in our coumadin clinic    Follow Up:  1 Week               Test Results Pending at Discharge       Amrit Mast MD  05/23/18  11:18 AM        Total time spent between progress note earlier today as well as discharge summary 40 minutes

## 2018-05-23 NOTE — PROGRESS NOTES
Continued Stay Note   [facility]     Patient Name: Hernando Caro  MRN: [id number]  Today's Date: 5/23/2018    Admit Date: 5/19/2018          Discharge Plan     Row Name 05/23/18 1302       Plan    Plan Home with nebulizer via [facility]    Patient/Family in Agreement with Plan yes    Plan Comments CCP met with pt to discuss d/c plan. Pt reiterates his concerns about his current lack of insurance, reportedly due to an administrative error by his employer resulting in a temporary lack of insurance coverage. Pt anticipates he will have health insurance reinstated by his employer on June 1st. Pt also voices concerns about his ability to continue working. CCP discussed option of initiating long-term disability application, which pt plans to do. Pt reports that he has been on short-term disability via his employer, which also reportedly lapsed last week along with the insurance. [facility] discussed plan with pt to contact his HR department to evaluate options for reinstating his short-term disability if he remains eligible. [facility] provided coupons ([employer]) for pt's home and discharge medications to assist with cost reduction: Lipitor ([facility]: free), Lanoxin ([facility]: $9.99, [facility]: $24.40), Lasix ([facility]: $4, [facility]: $5.68), Lopressor ([facility]: $4, [facility]: $7.22), potassium chloride ([facility]: $18.43, [facility]: $22, [facility]: $35.40), Aldactone ([facility]: $4, [facility]: $6.60), and Coumadin ([facility]: $4, [facility]: $9.19). Pt discharged on Combivent Respimat inhaler, which costs nearly $400 (goodrx.com). Pt's nurse inquired into more affordable alternatives via [facility] APRN (Christina). APRN states only alternative is nebulizer treatments (ipratropium-albuterol, 120 vials/3ml, [facility]: $31.87). Per [facility] ([place]), a nebulizer can be rented for $20/month and purchased for $100. [facility] met with pt who is agreeable to rent nebulizer until his insurance is reinstated, hopefully in the next week. [facility] Nadya) to deliver nebulizer  to pt's room today prior to d/c home. Pt states he can afford to fill his two d/c medications, approximately $40 per [facility] pharmacy (Joseph). Pt states his fiancé will transport him home this afternoon. Cierra Li LCSW              Discharge Codes    No documentation.       Expected Discharge Date and Time     Expected Discharge Date Expected Discharge Time    May 23, 2018             Samantha Li LCSW

## 2018-05-24 NOTE — PROGRESS NOTES
Case Management Discharge Note    Final Note: Pt DC'd home with fiance. Nebulizer delivered to bedside    Destination     No service coordination in this encounter.      Durable Medical Equipment     No service coordination in this encounter.      Dialysis/Infusion     No service coordination in this encounter.      Home Medical Care     No service coordination in this encounter.      Social Care     No service coordination in this encounter.        Other:  (car)

## 2018-06-12 ENCOUNTER — HOSPITAL ENCOUNTER (OUTPATIENT)
Dept: CARDIOLOGY | Facility: HOSPITAL | Age: 58
Setting detail: RECURRING SERIES
Discharge: HOME | End: 2018-06-12

## 2018-06-12 ENCOUNTER — OFFICE VISIT (OUTPATIENT)
Dept: CARDIOLOGY | Facility: CLINIC | Age: 58
End: 2018-06-12

## 2018-06-12 VITALS
HEART RATE: 83 BPM | BODY MASS INDEX: 25.71 KG/M2 | WEIGHT: 206.8 LBS | HEIGHT: 75 IN | SYSTOLIC BLOOD PRESSURE: 120 MMHG | DIASTOLIC BLOOD PRESSURE: 86 MMHG

## 2018-06-12 DIAGNOSIS — I42.0 DILATED CARDIOMYOPATHY (HCC): ICD-10-CM

## 2018-06-12 DIAGNOSIS — I48.19 PERSISTENT ATRIAL FIBRILLATION (HCC): ICD-10-CM

## 2018-06-12 DIAGNOSIS — I34.0 MODERATE MITRAL REGURGITATION: Primary | ICD-10-CM

## 2018-06-12 PROCEDURE — 99214 OFFICE O/P EST MOD 30 MIN: CPT | Performed by: INTERNAL MEDICINE

## 2018-06-12 PROCEDURE — 85610 PROTHROMBIN TIME: CPT

## 2018-06-12 PROCEDURE — 93000 ELECTROCARDIOGRAM COMPLETE: CPT | Performed by: INTERNAL MEDICINE

## 2018-06-12 PROCEDURE — 36416 COLLJ CAPILLARY BLOOD SPEC: CPT

## 2018-06-12 RX ORDER — FUROSEMIDE 20 MG/1
20 TABLET ORAL 3 TIMES DAILY
Qty: 90 TABLET | Refills: 1 | Status: SHIPPED | OUTPATIENT
Start: 2018-06-12 | End: 2018-06-12 | Stop reason: SDUPTHER
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
## 2018-06-13 NOTE — TELEPHONE ENCOUNTER
Requesting refill is different then the med list from his recent office visit on 06/12.  Will need to contact pt to inquire about dosing and verify.

## 2018-06-14 NOTE — TELEPHONE ENCOUNTER
I got a hold of pt and he stated he was he was instructed to take his Furosemide 20 mg 4 tabs in the morning and 40 mg in the evening.  Please advise if this dose correct.  Current Rx is for Furosemide 20 mg TID.  And do you want any labs on pt, if meds have been increased?

## 2018-06-15 ENCOUNTER — TELEPHONE (OUTPATIENT)
Dept: CARDIOLOGY | Facility: CLINIC | Age: 58
End: 2018-06-15

## 2018-06-15 RX ORDER — FUROSEMIDE 20 MG/1
TABLET ORAL
Qty: 540 TABLET | Refills: 0 | Status: ON HOLD | OUTPATIENT
Start: 2018-06-15 | End: 2018-10-02

## 2018-06-15 NOTE — TELEPHONE ENCOUNTER
I called pt, he reports that his employer has changed insurance companies and he no longer has the STD options (hasn't been there since march to pay anything into the insurance).  He states that his employer will be attempting to accomodate his disability request and pay him since it was no fault of his own.  They will be needing documentation bi weekly to update them of his status, his dx and when he will be returning to work.   I instructed pt to  contact his HR to discuss a form or template of what documentation will be needed for them to pay and the  (number) so that we can have on file (and have clarity) if this is going to be something that will be requested bi weekly until he returns to work.  Pt agreed to get the info and drop it off to the office.

## 2018-06-15 NOTE — TELEPHONE ENCOUNTER
I filled out paper work and I have always recommended he fill out short term disablity from the begain of his diagnosis

## 2018-06-15 NOTE — TELEPHONE ENCOUNTER
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  If he is not able to return, pt asked if he should apply for disability.  His ph [phone]. ( I printed off copy of his fmla and placed in your box - 6th flr ofc/shobha    He will call me back with  and fax for his workplace./shobha

## 2018-06-26 ENCOUNTER — HOSPITAL ENCOUNTER (OUTPATIENT)
Dept: CARDIOLOGY | Facility: HOSPITAL | Age: 58
Setting detail: RECURRING SERIES
Discharge: HOME | End: 2018-06-26

## 2018-06-26 ENCOUNTER — LAB (OUTPATIENT)
Dept: LAB | Facility: HOSPITAL | Age: 58
End: 2018-06-26

## 2018-06-26 ENCOUNTER — TELEPHONE (OUTPATIENT)
Dept: CARDIOLOGY | Facility: CLINIC | Age: 58
End: 2018-06-26

## 2018-06-26 ENCOUNTER — OFFICE VISIT (OUTPATIENT)
Dept: CARDIOLOGY | Facility: CLINIC | Age: 58
End: 2018-06-26

## 2018-06-26 VITALS
SYSTOLIC BLOOD PRESSURE: 132 MMHG | HEIGHT: 77 IN | DIASTOLIC BLOOD PRESSURE: 90 MMHG | BODY MASS INDEX: 21.61 KG/M2 | HEART RATE: 77 BPM | WEIGHT: 183 LBS

## 2018-06-26 DIAGNOSIS — I50.43 ACUTE ON CHRONIC COMBINED SYSTOLIC AND DIASTOLIC CONGESTIVE HEART FAILURE (HCC): ICD-10-CM

## 2018-06-26 DIAGNOSIS — Z51.81 THERAPEUTIC DRUG MONITORING: ICD-10-CM

## 2018-06-26 DIAGNOSIS — I42.0 DILATED CARDIOMYOPATHY (HCC): Primary | ICD-10-CM

## 2018-06-26 DIAGNOSIS — I48.19 PERSISTENT ATRIAL FIBRILLATION (HCC): ICD-10-CM

## 2018-06-26 DIAGNOSIS — I48.92 ATRIAL FLUTTER, UNSPECIFIED TYPE (HCC): ICD-10-CM

## 2018-06-26 LAB
ANION GAP SERPL CALCULATED.3IONS-SCNC: 11.8 MMOL/L
BUN BLD-MCNC: 12 MG/DL (ref 6–20)
BUN/CREAT SERPL: 9.4 (ref 7–25)
CALCIUM SPEC-SCNC: 9.7 MG/DL (ref 8.6–10.5)
CHLORIDE SERPL-SCNC: 91 MMOL/L (ref 98–107)
CO2 SERPL-SCNC: 31.2 MMOL/L (ref 22–29)
CREAT BLD-MCNC: 1.28 MG/DL (ref 0.76–1.27)
DIGOXIN SERPL-MCNC: 0.7 NG/ML (ref 0.6–1.2)
GFR SERPL CREATININE-BSD FRML MDRD: 70 ML/MIN/1.73
GLUCOSE BLD-MCNC: 108 MG/DL (ref 65–99)
POTASSIUM BLD-SCNC: 3.8 MMOL/L (ref 3.5–5.2)
SODIUM BLD-SCNC: 134 MMOL/L (ref 136–145)

## 2018-06-26 PROCEDURE — 93000 ELECTROCARDIOGRAM COMPLETE: CPT | Performed by: NURSE PRACTITIONER

## 2018-06-26 PROCEDURE — 85610 PROTHROMBIN TIME: CPT

## 2018-06-26 PROCEDURE — 99214 OFFICE O/P EST MOD 30 MIN: CPT | Performed by: NURSE PRACTITIONER

## 2018-06-26 PROCEDURE — 80162 ASSAY OF DIGOXIN TOTAL: CPT

## 2018-06-26 PROCEDURE — 36415 COLL VENOUS BLD VENIPUNCTURE: CPT

## 2018-06-26 PROCEDURE — 80048 BASIC METABOLIC PNL TOTAL CA: CPT

## 2018-06-26 PROCEDURE — 36416 COLLJ CAPILLARY BLOOD SPEC: CPT

## 2018-06-26 NOTE — TELEPHONE ENCOUNTER
Spoke with patient about improved Cr. And labs are stable. He is to continue current regimen. He verbalized understanding. AL

## 2018-06-26 NOTE — PROGRESS NOTES
Date of Office Visit: 2018  Encounter Provider: JANET Ortega  Place of Service: [facility] CARDIOLOGY  Patient Name: Hernando Caro  :1960    Chief Complaint   Patient presents with   • Coronary Artery Disease   • Cardiomyopathy   • Atrial Fibrillation   • Hypertension   • Edema   • Fatigue   :     HPI: Hernando Caro is a 58 y.o. male is a patient of Dr. Mast. I am seeing him today and have reviewed the records.     His past medical history is significant of mild nonobstructive coronary artery disease, atrial fibrillation/flutter, hypertension, chronic systolic congestive heart failure, dilated cardiomyopathy, chronic kidney disease, and mitral regurgitation.     He had a negative stress test in 2015.    He then presented in 2018 with congestive heart failure.  Echocardiogram revealed moderately decreased left ventricular systolic function with EF of 35%.  There were hypokinetic wall segments.  The left ventricular cavity was moderately to severely dilated, mild concentric hypertrophy, grade 3 diastolic dysfunction, moderate to severe mitral valve regurgitation present, moderate tricuspid valve regurgitation with markedly elevated RVSP of 78 mmHg.  He had cardiac catheterization on 2018 which revealed left main normal, LAD normal, left circumflex mild luminal irregularities proximal, RCA normal and +1 mitral regurgitation.  This was mild nonobstructive disease and he had IV diuretic that was switched to mouth and his Toprol was to be increased.  On 2018 he presented with some edema bilaterally.  His cough was improved.  He was given IV Lasix 60 mg.  He was to take 60 mg of by mouth Lasix twice daily    On 2018 he presented with recurrent heart failure.  He was started on dobutamine as well as IV diuretic. CXR revealed small right pleural effusion.  He was last seen in the office on 2018.  His Lasix was increased.  He was not taking  "his medications as prescribed as he was \"stretching them out\" as much as possible due to cost.  His atrial fibrillation was well controlled.  He was to follow-up in a week.    He presents today for reassessment. He has been taking 80 mg Lasix in am and 40 mg in pm. His weight is down 23 pounds in 10 days.  He has not weighed himself in the last 2-3 days.  Overall he has noticed great improvement of his edema.  He is no longer short breath  at rest or with activity.  He is still sleeping with his head elevated and he has some discomfort laying flat.  He is wearing compression hose occasionally at night.  He is not adding any salt to his food.  He has not been eating processed for fast food.  He states that he notices that his eyes were different and have a change of color or appearance.  He denies any green halos or vision changes.  He was able to cut the grass the other day without great difficulty.  He cut the back yard and then took a break and was able to complete the findings are better than he had expected.  He does have some fatigue but states that he is ready to go back to work because his finances are tight and disability checks will not allow him to continue his medication.  He states that he occasionally drops 400 miles in a day and that he often has to lift 40 pounds and at work.      Allergies   Allergen Reactions   • Ace Inhibitors Swelling   • Lisinopril Angioedema       Past Medical History:   Diagnosis Date   • Bronchitis     2018   • Cardiomyopathy    • Chronic systolic CHF (congestive heart failure)    • CKD (chronic kidney disease)    • Ejection fraction < 50%    • Essential hypertension    • Mild CAD     Non-obstructive   • Mitral regurgitation    • Persistent atrial fibrillation     flutter   • SOB (shortness of breath)        Past Surgical History:   Procedure Laterality Date   • BACK SURGERY     • CARDIAC CATHETERIZATION N/A 4/3/2018    Procedure: Coronary angiography;  Surgeon: Geovany RAMIREZ" "MD Collins;  Location: [facility] INVASIVE LOCATION;  Service: Cardiovascular   • CARDIAC CATHETERIZATION N/A 4/3/2018    Procedure: Left heart cath;  Surgeon: Geovany Guadalupe MD;  Location: [facility] INVASIVE LOCATION;  Service: Cardiovascular   • CARDIAC CATHETERIZATION N/A 4/3/2018    Procedure: Left ventriculography;  Surgeon: Geovany Guadalupe MD;  Location: [facility] INVASIVE LOCATION;  Service: Cardiovascular   • CARDIAC CATHETERIZATION N/A 4/3/2018    Procedure: Right heart cath;  Surgeon: Geovany Guadalupe MD;  Location: [facility] INVASIVE LOCATION;  Service: Cardiovascular     Family and social history reviewed.   Review of Systems   Constitution: Positive for malaise/fatigue.   Cardiovascular: Positive for leg swelling (improved) and orthopnea (minimal).   Respiratory: Negative for shortness of breath.    Hematologic/Lymphatic: Negative for bleeding problem.   All other systems were reviewed and are negative        Objective:     Vitals:    06/26/18 0815   BP: 132/90   BP Location: Left arm   Patient Position: Sitting   Pulse: 77   Weight: 83 kg (183 lb)   Height: 195.6 cm (77\")     Body mass index is 21.7 kg/m².    PHYSICAL EXAM:  Physical Exam   Constitutional: He is oriented to person, place, and time. He appears well-developed and well-nourished. No distress.   HENT:   Head: Normocephalic.   Eyes: Conjunctivae are normal.   Glasses on   Neck: Normal range of motion. No JVD present.   Cardiovascular: Normal rate, normal heart sounds and intact distal pulses.  An irregular rhythm present.   No murmur heard.  Pulses:       Carotid pulses are 2+ on the right side, and 2+ on the left side.       Radial pulses are 2+ on the right side, and 2+ on the left side.        Posterior tibial pulses are 2+ on the right side, and 2+ on the left side.   Pulmonary/Chest: Effort normal. No respiratory distress. He has decreased breath sounds in the right lower field. He has no wheezes. He has no " rhonchi. He has no rales. He exhibits no tenderness.   Abdominal: Soft. Bowel sounds are normal. He exhibits no distension.   Musculoskeletal: Normal range of motion. He exhibits edema (trace lateral ankle, nonpitting bilateral).   Neurological: He is alert and oriented to person, place, and time.   Skin: Skin is warm, dry and intact. No rash noted. He is not diaphoretic. No cyanosis.   Psychiatric: He has a normal mood and affect. His behavior is normal. Judgment and thought content normal.       ECG 12 Lead  Date/Time: 6/26/2018 8:25 AM  Performed by: MILLIE WHEELER  Authorized by: MILLIE WHEELER   Comparison: compared with previous ECG from 6/12/2018  Similar to previous ECG  Rhythm: atrial flutter  Ectopy: PVCs  Rate: normal  BPM: 77  Other findings: LVH  Clinical impression: abnormal ECG          Current Outpatient Prescriptions   Medication Sig Dispense Refill   • atorvastatin (LIPITOR) 10 MG tablet Take 1 tablet by mouth Every Night. 30 tablet 3   • digoxin (LANOXIN) 250 MCG tablet Take 1 tablet by mouth Daily. 30 tablet 3   • furosemide (LASIX) 20 MG tablet TAKE 4 TABLETS BY MOUTH IN THE MORNING THEN 2 TABLETS IN THE EVENING OR AS DIRECTED. 540 tablet 0   • ipratropium-albuterol (DUO-NEB) 0.5-2.5 mg/3 ml nebulizer  Take 3 mL by nebulization Every 4 (Four) Hours As Needed for Wheezing. 360 mL 2   • metoprolol succinate XL (TOPROL-XL) 100 MG 24 hr tablet Take 1 tablet by mouth Every 12 (Twelve) Hours. 60 tablet 3   • potassium chloride (K-DUR) 10 MEQ CR tablet Take 1 tablet by mouth Daily. 30 tablet 11   • spironolactone (ALDACTONE) 25 MG tablet Take 0.5 tablets by mouth Daily. 15 tablet 11   • warfarin (COUMADIN) 5 MG tablet Take 1 tablet by mouth Daily. (Patient taking differently: Take 5 mg by mouth Daily. 5 mg. On mondays, 2.5 mg. Daily except mondays.) 30 tablet 3   • furosemide (LASIX) 40 MG tablet Take 1 tablet by mouth 2 (Two) Times a Day. (Patient taking differently: Take 100 mg by mouth Daily.) 60  tablet 3   • tadalafil (CIALIS) 20 MG tablet Take 1 tablet by mouth Daily As Needed for erectile dysfunction. 9 tablet 5     No current facility-administered medications for this visit.      Assessment:       Diagnosis Plan   1. Dilated cardiomyopathy     2. Acute on chronic combined systolic and diastolic congestive heart failure  Basic Metabolic Panel   3. Therapeutic drug monitoring  Basic Metabolic Panel    Digoxin Level   4. Persistent atrial fibrillation  Digoxin Level   5. Atrial flutter, unspecified type          Orders Placed This Encounter   Procedures   • Basic Metabolic Panel     Standing Status:   Future     Number of Occurrences:   1     Standing Expiration Date:   6/27/2019   • Digoxin Level     Standing Status:   Future     Number of Occurrences:   1     Standing Expiration Date:   6/26/2019   • ECG 12 Lead     This order was created via procedure documentation     Plan:   1. Acute on chronic systolic congestive heart failure.  He has improved shortness of breath, edema.  He still has some orthopnea and feels most comfortable with his head elevated with sleep.  We'll have a BMP today to evaluate kidney function and electrolytes. He expresses that he is ready to return to work. He will require light duty. We discussed the possibility of cardiac rehabilitation enrollment once his heart failure medication has been stabilized for 6 weeks.  Heart Failure  Assessment  • NYHA class II - There is slight limitation of physical activity. The patient is comfortable at rest, but physical activity results in fatigue, palpitations or shortness of breath.  • The patient was newly diagnosed with heart failure within the past 12 months  • ACE inhibitor not prescribed for medical reasons  • Beta blocker prescribed  • Diuretics prescribed  • Angiotensin receptor blocker (ARB) not prescribed for medical reasons  • The most recent ejection fraction is 30%  • Left ventricular function is moderately reduced by qualitative  "assessment  • The left ventricle was last assessed on 5/19/2018    Plan  • The patient has received heart failure education on the following topics: dietary sodium restriction, medication instructions, minimizing or avoiding NSAID use, physical activity and weight monitoring  • The heart failure care plan was discussed with the patient today including: up-titrating HF medications    Subjective/Objective  • The patient reports orthopnea (minimal)  • Physical exam findings positive for peripheral edema.   • Physical exam findings negative for rales and elevated JVP.        2.Dilated cardiomyopathy with biventricular dilation. EF 30 % 05/2018. EF 35 % in 03/2018. Will need repeat echo and if still decreased may need discussion about defibrillator and BiV pacer.     3. Persistent Atrial fibrillation. Controlled rate. Will get digoxin level due to complaint of \"eyes appearing different\" he denied nausea, vision changes, or green halos in the vision.     Atrial Fibrillation and Atrial Flutter  Assessment  • The patient has persistent atrial fibrillation  • This is valvular in etiology  • The patient's CHADS2-VASc score is 3  • A [id number] score of 2 or more is considered a high risk for a thromboembolic event  • Warfarin prescribed    Plan  • Continue in atrial fibrillation with rate control  • Continue warfarin for antithrombotic therapy, bleeding issues discussed  • Continue beta blocker for rate control      4.Coronary artery disease- nonobstructive on cath in 04/2018    5. Mitral regurgitation mild on echo 05/19/2018.     6. Tricuspid regugitation- moderate to severe on echo 05/2018    7. Chronic kidney disease Cr 1.5, e GFR 58. Never seen by nephrology    8. Hyperlipidemia- on atorvastatin 10 mg. LDL 43 in 05/2018    9. Prediabetes- hemoglobin A1c 5.9 on 5/20/2018.      Follow up in 3 weeks with Dr. Mast    Patient was instructed to call the office if new symptoms develop or report to nearest ER if heart attack " or stroke is suspected.      It has been a pleasure to participate in this patient's care.      Thank you,  JANET Ortega      **Art Disclaimer:**  Much of this encounter note is an electronic transcription/translation of spoken language to printed text. The electronic translation of spoken language may permit erroneous, or at times, nonsensical words or phrases to be inadvertently transcribed. Although I have reviewed the note for such errors, some may still exist.

## 2018-07-24 ENCOUNTER — OFFICE VISIT (OUTPATIENT)
Dept: CARDIOLOGY | Facility: CLINIC | Age: 58
End: 2018-07-24

## 2018-07-24 VITALS
HEIGHT: 77 IN | BODY MASS INDEX: 23.09 KG/M2 | HEART RATE: 93 BPM | WEIGHT: 195.6 LBS | DIASTOLIC BLOOD PRESSURE: 76 MMHG | SYSTOLIC BLOOD PRESSURE: 110 MMHG

## 2018-07-24 DIAGNOSIS — I48.19 PERSISTENT ATRIAL FIBRILLATION (HCC): ICD-10-CM

## 2018-07-24 DIAGNOSIS — I42.0 DILATED CARDIOMYOPATHY (HCC): Primary | ICD-10-CM

## 2018-07-24 DIAGNOSIS — I50.42 CHRONIC COMBINED SYSTOLIC AND DIASTOLIC CONGESTIVE HEART FAILURE (HCC): ICD-10-CM

## 2018-07-24 PROCEDURE — 99214 OFFICE O/P EST MOD 30 MIN: CPT | Performed by: INTERNAL MEDICINE

## 2018-07-24 PROCEDURE — 93000 ELECTROCARDIOGRAM COMPLETE: CPT | Performed by: INTERNAL MEDICINE

## 2018-07-30 NOTE — PROGRESS NOTES
Subjective:     Encounter Date:06/12/2018      Patient ID: Hernando Caro is a 58 y.o. male.    Chief Complaint:  Cardiomyopathy   Associated symptoms include coughing and fatigue. Pertinent negatives include no chest pain.   Congestive Heart Failure   Presents for follow-up visit. Associated symptoms include edema, fatigue and shortness of breath. Pertinent negatives include no chest pain, chest pressure, muscle weakness, palpitations or paroxysmal nocturnal dyspnea. The symptoms have been stable.   Atrial Fibrillation   Presents for follow-up visit. Symptoms include shortness of breath. Symptoms are negative for chest pain and palpitations. The symptoms have been stable. Past medical history includes atrial fibrillation and CHF.   Hypertension   This is a chronic problem. The current episode started more than 1 year ago. The problem is controlled. Associated symptoms include shortness of breath. Pertinent negatives include no chest pain or palpitations.       Patient resents today for reevaluation.  Patient presents today for reevaluation.  Clinically he's actually doing much better.  He is back to work unfortunately that this point I'm working in the heat.  Says he is working 12 hour days.  I think we could be over diuresing him.  He is having extreme fatigue as day goes on he says his mouth gets very very dry.  He denies palpitations edema lightheadedness chest pain.    Review of Systems   Constitution: Positive for fatigue.   Cardiovascular: Negative for chest pain and palpitations.   Respiratory: Positive for cough and shortness of breath.    Musculoskeletal: Negative for muscle weakness.         ECG 12 Lead  Date/Time: 7/24/2018 9:55 AM  Performed by: JANEEN HOOK  Authorized by: JANEEN HOOK   Comparison: compared with previous ECG from 6/26/2018  Similar to previous ECG  Rhythm: atrial fibrillation  Clinical impression: abnormal ECG               Objective:     Physical Exam    Constitutional: He is oriented to person, place, and time. He appears well-developed.   HENT:   Head: Normocephalic.   Eyes: Conjunctivae are normal.   Neck: Normal range of motion. No JVD present.   Cardiovascular: Normal rate and normal heart sounds.  An irregularly irregular rhythm present.   Pulmonary/Chest: Breath sounds normal. He has no rales.   Abdominal: Soft. Bowel sounds are normal. He exhibits no distension.   Musculoskeletal: Normal range of motion. He exhibits no edema.   Neurological: He is alert and oriented to person, place, and time.   Skin: Skin is warm and dry.   Psychiatric: He has a normal mood and affect. His behavior is normal.   Vitals reviewed.      Lab Review:       Assessment:          Diagnosis Plan   1. Dilated cardiomyopathy (CMS/HCC)     2. Persistent atrial fibrillation (CMS/HCC)     3. Chronic combined systolic and diastolic congestive heart failure (CMS/HCC)            Plan:       1.  Cardiomyopathy.  Overall patient is doing somewhat better.  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  He does at times feel like he is dehydrated per se working out in this heat.  I elected to cut his Lasix back from 80 mg a day down to 40 mg a day to see how he responds.  I did review blood daily weights watching for swelling shortness of breath when lying down and etc.  I told her maybe some days where he has to take more than 40 mg and he should.  Based on his weight and clinical symptoms.  If he has a questions or course I told her contact my office.  2.  Atrial fibrillation heart rates well-controlled  3.  Hypertension blood pressures good  4.  Mitral regurgitation moderate clinically doing well  5.  We'll have him reevaluated in 3 months sooner if issues    Atrial Fibrillation and Atrial Flutter  Assessment  • The patient has persistent atrial fibrillation  • This is valvular in etiology  • The patient's CHADS2-VASc score is 3  • A [id number]  score of 2 or more is considered a high risk for a thromboembolic event  • Warfarin prescribed    Plan  • Continue in atrial fibrillation with rate control  • Continue warfarin for antithrombotic therapy, bleeding issues discussed  • Continue beta blocker for rate control      Heart Failure  Assessment  • NYHA class III-A - There is limitation of physical activity. The patient is comfortable at rest, but ordinary activity causes fatigue, palpitations or shortness of breath.  • The patient was newly diagnosed with heart failure within the past 12 months  • ACE inhibitor not prescribed for medical reasons  • Beta blocker prescribed  • Diuretics prescribed  • Angiotensin receptor blocker (ARB) not prescribed for medical reasons  • Left ventricular function is moderately reduced by qualitative assessment    Plan  • The patient has received heart failure education on the following topics: dietary sodium restriction, medication instructions, minimizing or avoiding NSAID use, physical activity and minimizing alcohol intake    Subjective/Objective  • The patient reports dyspnea  • Physical exam findings positive for peripheral edema.   • Physical exam findings negative for rales and elevated JVP.

## 2018-08-10 ENCOUNTER — OFFICE VISIT (OUTPATIENT)
Dept: FAMILY MEDICINE CLINIC | Facility: CLINIC | Age: 58
End: 2018-08-10

## 2018-08-10 VITALS
TEMPERATURE: 98.1 F | WEIGHT: 188 LBS | HEIGHT: 77 IN | DIASTOLIC BLOOD PRESSURE: 90 MMHG | OXYGEN SATURATION: 98 % | HEART RATE: 108 BPM | BODY MASS INDEX: 22.2 KG/M2 | RESPIRATION RATE: 16 BRPM | SYSTOLIC BLOOD PRESSURE: 120 MMHG

## 2018-08-10 DIAGNOSIS — I10 ESSENTIAL HYPERTENSION: ICD-10-CM

## 2018-08-10 DIAGNOSIS — I48.19 PERSISTENT ATRIAL FIBRILLATION (HCC): ICD-10-CM

## 2018-08-10 DIAGNOSIS — I50.40 COMBINED SYSTOLIC AND DIASTOLIC CONGESTIVE HEART FAILURE, UNSPECIFIED CONGESTIVE HEART FAILURE CHRONICITY: Primary | ICD-10-CM

## 2018-08-10 PROBLEM — I50.43 ACUTE ON CHRONIC COMBINED SYSTOLIC AND DIASTOLIC CONGESTIVE HEART FAILURE (HCC): Status: RESOLVED | Noted: 2018-05-19 | Resolved: 2018-08-10

## 2018-08-10 PROBLEM — I42.9 CARDIOMYOPATHY (HCC): Status: RESOLVED | Noted: 2018-03-30 | Resolved: 2018-08-10

## 2018-08-10 PROBLEM — I48.92 NEW ONSET ATRIAL FLUTTER (HCC): Status: RESOLVED | Noted: 2018-04-03 | Resolved: 2018-08-10

## 2018-08-10 PROCEDURE — 99214 OFFICE O/P EST MOD 30 MIN: CPT | Performed by: INTERNAL MEDICINE

## 2018-08-10 RX ORDER — TADALAFIL 20 MG/1
20 TABLET ORAL DAILY PRN
Qty: 10 TABLET | Refills: 3 | Status: SHIPPED | OUTPATIENT
Start: 2018-08-10 | End: 2018-09-17 | Stop reason: ALTCHOICE

## 2018-08-10 NOTE — PROGRESS NOTES
Subjective   Hernando Caro is a 58 y.o. male.     History of Present Illness   Patient was seen as a follow-up from the hospital for congestive heart failure.  His been extremely stable present time and his had no true weight gain over the past several days.  Patient was instructed to check his weight blood pressure return to our clinic in 1 week.  Patient's blood pressures been running 110s over 80s.  He does have atrial fibrillation is been using digoxin to control rate and warfarin to prevent the clotting.    Dictated utilizing Dragon dictation. If there are questions or for further clarification, please contact me.   The following portions of the patient's history were reviewed and updated as appropriate: allergies, current medications, past family history, past medical history, past social history, past surgical history and problem list.    Review of Systems   Constitutional: Negative for fatigue and fever.   HENT: Positive for congestion. Negative for trouble swallowing.    Eyes: Negative for discharge and visual disturbance.   Respiratory: Negative for choking and shortness of breath.    Cardiovascular: Negative for chest pain and palpitations.   Gastrointestinal: Negative for abdominal pain and blood in stool.   Endocrine: Negative.    Genitourinary: Negative for genital sores and hematuria.   Musculoskeletal: Negative for gait problem and joint swelling.   Skin: Negative for color change, pallor, rash and wound.   Allergic/Immunologic: Positive for environmental allergies. Negative for immunocompromised state.   Neurological: Negative for facial asymmetry and speech difficulty.   Psychiatric/Behavioral: Negative for hallucinations and suicidal ideas.       Objective   Physical Exam   Constitutional: He is oriented to person, place, and time. He appears well-developed and well-nourished.   HENT:   Head: Normocephalic.   Eyes: Pupils are equal, round, and reactive to light. Conjunctivae are normal.   Neck:  Normal range of motion. Neck supple.   Cardiovascular: Normal rate.  Exam reveals gallop. Exam reveals no friction rub.    Murmur heard.  Pulmonary/Chest: Effort normal. No respiratory distress. He has no wheezes. He has rales. He exhibits no tenderness.   Abdominal: Soft. Bowel sounds are normal.   Musculoskeletal: Normal range of motion.   Neurological: He is alert and oriented to person, place, and time.   Skin: Skin is warm and dry.   Psychiatric: He has a normal mood and affect. His behavior is normal. Judgment and thought content normal.   Nursing note and vitals reviewed.      Assessment/Plan   Problems Addressed this Visit        Cardiovascular and Mediastinum    Essential hypertension    Relevant Orders    Basic Metabolic Panel    proBNP    Magnesium    Combined systolic and diastolic congestive heart failure (CMS/HCC) - Primary    Relevant Medications    tadalafil (CIALIS) 20 MG tablet    Other Relevant Orders    Basic Metabolic Panel    proBNP    Magnesium    Persistent atrial fibrillation (CMS/HCC)    Relevant Medications    tadalafil (CIALIS) 20 MG tablet    Other Relevant Orders    Basic Metabolic Panel    proBNP    Magnesium

## 2018-08-29 ENCOUNTER — OFFICE VISIT (OUTPATIENT)
Dept: FAMILY MEDICINE CLINIC | Facility: CLINIC | Age: 58
End: 2018-08-29

## 2018-08-29 VITALS
WEIGHT: 188 LBS | DIASTOLIC BLOOD PRESSURE: 90 MMHG | OXYGEN SATURATION: 99 % | HEART RATE: 104 BPM | TEMPERATURE: 97.6 F | BODY MASS INDEX: 22.2 KG/M2 | HEIGHT: 77 IN | SYSTOLIC BLOOD PRESSURE: 134 MMHG

## 2018-08-29 DIAGNOSIS — R06.02 SHORTNESS OF BREATH: ICD-10-CM

## 2018-08-29 DIAGNOSIS — I50.40 COMBINED SYSTOLIC AND DIASTOLIC CONGESTIVE HEART FAILURE, UNSPECIFIED CONGESTIVE HEART FAILURE CHRONICITY: ICD-10-CM

## 2018-08-29 DIAGNOSIS — I07.1 MODERATE TRICUSPID REGURGITATION: ICD-10-CM

## 2018-08-29 DIAGNOSIS — I48.19 PERSISTENT ATRIAL FIBRILLATION (HCC): ICD-10-CM

## 2018-08-29 DIAGNOSIS — I10 ESSENTIAL HYPERTENSION: ICD-10-CM

## 2018-08-29 DIAGNOSIS — I34.0 MODERATE MITRAL REGURGITATION: ICD-10-CM

## 2018-08-29 DIAGNOSIS — I42.0 DILATED CARDIOMYOPATHY (HCC): ICD-10-CM

## 2018-08-29 DIAGNOSIS — J44.1 CHRONIC OBSTRUCTIVE PULMONARY DISEASE WITH ACUTE EXACERBATION (HCC): Primary | ICD-10-CM

## 2018-08-29 LAB
ANION GAP SERPL CALCULATED.3IONS-SCNC: 12.4 MMOL/L
BUN BLD-MCNC: 18 MG/DL (ref 6–20)
BUN/CREAT SERPL: 12.1 (ref 7–25)
CALCIUM SPEC-SCNC: 9.8 MG/DL (ref 8.6–10.5)
CHLORIDE SERPL-SCNC: 90 MMOL/L (ref 98–107)
CO2 SERPL-SCNC: 27.6 MMOL/L (ref 22–29)
CREAT BLD-MCNC: 1.49 MG/DL (ref 0.76–1.27)
GFR SERPL CREATININE-BSD FRML MDRD: 59 ML/MIN/1.73
GLUCOSE BLD-MCNC: 115 MG/DL (ref 65–99)
MAGNESIUM SERPL-MCNC: 2.2 MG/DL (ref 1.6–2.6)
NT-PROBNP SERPL-MCNC: 9982 PG/ML (ref 0–900)
POTASSIUM BLD-SCNC: 4.4 MMOL/L (ref 3.5–5.2)
SODIUM BLD-SCNC: 130 MMOL/L (ref 136–145)

## 2018-08-29 PROCEDURE — 99214 OFFICE O/P EST MOD 30 MIN: CPT | Performed by: NURSE PRACTITIONER

## 2018-08-29 PROCEDURE — 83735 ASSAY OF MAGNESIUM: CPT | Performed by: INTERNAL MEDICINE

## 2018-08-29 PROCEDURE — 36415 COLL VENOUS BLD VENIPUNCTURE: CPT | Performed by: INTERNAL MEDICINE

## 2018-08-29 PROCEDURE — 83880 ASSAY OF NATRIURETIC PEPTIDE: CPT | Performed by: INTERNAL MEDICINE

## 2018-08-29 PROCEDURE — 80048 BASIC METABOLIC PNL TOTAL CA: CPT | Performed by: INTERNAL MEDICINE

## 2018-08-29 RX ORDER — BUDESONIDE AND FORMOTEROL FUMARATE DIHYDRATE 160; 4.5 UG/1; UG/1
2 AEROSOL RESPIRATORY (INHALATION)
Qty: 1 INHALER | Refills: 5 | COMMUNITY
End: 2018-08-29 | Stop reason: SDUPTHER

## 2018-08-29 RX ORDER — FLUTICASONE PROPIONATE 50 MCG
2 SPRAY, SUSPENSION (ML) NASAL DAILY
Qty: 1 BOTTLE | Refills: 5 | Status: SHIPPED | OUTPATIENT
Start: 2018-08-29 | End: 2018-09-18

## 2018-08-29 RX ORDER — ALBUTEROL SULFATE 90 UG/1
2 AEROSOL, METERED RESPIRATORY (INHALATION) EVERY 4 HOURS PRN
Qty: 1 INHALER | Refills: 5 | Status: SHIPPED | OUTPATIENT
Start: 2018-08-29

## 2018-08-29 RX ORDER — BUDESONIDE AND FORMOTEROL FUMARATE DIHYDRATE 160; 4.5 UG/1; UG/1
2 AEROSOL RESPIRATORY (INHALATION)
Qty: 10.2 G | Refills: 5 | Status: SHIPPED | OUTPATIENT
Start: 2018-08-29 | End: 2018-09-18

## 2018-08-29 NOTE — PROGRESS NOTES
Subjective   Hernando Caro is a 58 y.o. male.     History of Present Illness   C/o SOA starting last night using albuterol inhaler but not helping, has oxygen at home and had to use last night but usually doesn't need, former smoker 1/2 ppd > 20 years but quit 034/18 with dx COPD this year with chest imaging, notes sx worse with high humidity weather, no prev maintenance inhaler, With new dx combined systolic and diastolic heart failure, moderate mitral regurgitation, moderate tricuspid regurgitation, persistent atrial fibrillation and dilated cardiomyopathy found on echo by me 03/18 and has seen cardiology Dr Mast for monitoring on digoxin 25 0 mg, lasix 20 mg, metoprool  mg, potassium 10 mEq, spironolactone 25 mg, warfarin 5 mg, lipitor 10 mg, no CP dizziness HA LE edema or abd distension, no wheezing or coughing, no sore throat, ear pain, some sinus congestion and nasal congestion, has tried OTC afrin no help    The following portions of the patient's history were reviewed and updated as appropriate: allergies, current medications, past family history, past medical history, past social history, past surgical history and problem list.    Review of Systems   Constitutional: Negative for fever.   HENT: Positive for congestion, facial swelling, postnasal drip, rhinorrhea and sinus pressure. Negative for dental problem, drooling, ear discharge, ear pain, hearing loss, mouth sores, nosebleeds, sinus pain, sneezing, sore throat, tinnitus, trouble swallowing and voice change.    Respiratory: Positive for chest tightness and shortness of breath. Negative for cough and wheezing.    Cardiovascular: Negative for chest pain, palpitations and leg swelling.   Gastrointestinal: Negative for abdominal distention, abdominal pain, anal bleeding, blood in stool, constipation, diarrhea, nausea, rectal pain and vomiting.   Allergic/Immunologic: Positive for environmental allergies.   Neurological: Negative for dizziness and  headaches.   All other systems reviewed and are negative.      Objective   Physical Exam   Constitutional: He is oriented to person, place, and time. He appears well-developed and well-nourished.   HENT:   Head: Normocephalic and atraumatic.   Right Ear: Hearing and tympanic membrane normal.   Left Ear: Hearing and tympanic membrane normal.   Nose: Mucosal edema present. Right sinus exhibits no maxillary sinus tenderness and no frontal sinus tenderness. Left sinus exhibits no maxillary sinus tenderness and no frontal sinus tenderness.   Mouth/Throat: No oropharyngeal exudate, posterior oropharyngeal edema or posterior oropharyngeal erythema.   Eyes: Pupils are equal, round, and reactive to light. Conjunctivae and EOM are normal.   Neck: Normal range of motion. Neck supple. No thyromegaly present.   Cardiovascular: Normal rate, regular rhythm and normal heart sounds.    Pulmonary/Chest: Effort normal and breath sounds normal.   Abdominal: Soft. He exhibits no distension and no mass. There is no tenderness. There is no rebound and no guarding. No hernia.   Musculoskeletal: Normal range of motion. He exhibits no edema (B LE).   Lymphadenopathy:     He has no cervical adenopathy.   Neurological: He is alert and oriented to person, place, and time.   Skin: Skin is warm and dry.   Psychiatric: He has a normal mood and affect. His behavior is normal. Judgment and thought content normal.   Vitals reviewed.      Assessment/Plan   Hernando was seen today for shortness of breath.    Diagnoses and all orders for this visit:    Chronic obstructive pulmonary disease with acute exacerbation (CMS/HCC)    Shortness of breath    Combined systolic and diastolic congestive heart failure, unspecified congestive heart failure chronicity (CMS/HCC)    Moderate mitral regurgitation    Moderate tricuspid regurgitation    Persistent atrial fibrillation (CMS/HCC)    Essential hypertension    Dilated cardiomyopathy (CMS/HCC)    Other orders  -      fluticasone (FLONASE) 50 MCG/ACT nasal spray; 2 sprays into the nostril(s) as directed by provider Daily for 30 days. Administer 2 sprays in each nostril for each dose.  -     albuterol (PROVENTIL HFA;VENTOLIN HFA) 108 (90 Base) MCG/ACT inhaler; Inhale 2 puffs Every 4 (Four) Hours As Needed for Wheezing or Shortness of Air.  -     budesonide-formoterol (SYMBICORT) 160-4.5 MCG/ACT inhaler; Inhale 2 puffs 2 (Two) Times a Day.    trial symbicort 160/4.5 mg BID gave samples and coupon, refill albuterol HFA and monitor, suspect allergy induced reactive airway, trial flonase NS 2 puffs daily, cont FU with cardiology and monitor BP, check weight at home, cont all chronic dz meds

## 2018-08-29 NOTE — PATIENT INSTRUCTIONS
trial symbicort 160/4.5 mg BID gave samples and coupon, refill albuterol HFA and monitor, suspect allergy induced reactive airway, trial flonase NS 2 puffs daily, cont FU with cardiology and monitor BP, check weight at home, cont all chronic dz meds

## 2018-09-05 ENCOUNTER — OFFICE VISIT (OUTPATIENT)
Dept: FAMILY MEDICINE CLINIC | Facility: CLINIC | Age: 58
End: 2018-09-05

## 2018-09-05 VITALS
HEART RATE: 110 BPM | HEIGHT: 77 IN | SYSTOLIC BLOOD PRESSURE: 124 MMHG | DIASTOLIC BLOOD PRESSURE: 90 MMHG | WEIGHT: 187 LBS | BODY MASS INDEX: 22.08 KG/M2 | TEMPERATURE: 98.5 F | OXYGEN SATURATION: 98 % | RESPIRATION RATE: 15 BRPM

## 2018-09-05 DIAGNOSIS — I50.40 COMBINED SYSTOLIC AND DIASTOLIC CONGESTIVE HEART FAILURE, UNSPECIFIED CONGESTIVE HEART FAILURE CHRONICITY: Primary | ICD-10-CM

## 2018-09-05 DIAGNOSIS — I48.19 PERSISTENT ATRIAL FIBRILLATION (HCC): ICD-10-CM

## 2018-09-05 DIAGNOSIS — I10 ESSENTIAL HYPERTENSION: ICD-10-CM

## 2018-09-05 PROCEDURE — 99214 OFFICE O/P EST MOD 30 MIN: CPT | Performed by: INTERNAL MEDICINE

## 2018-09-05 NOTE — PROGRESS NOTES
Subjective   Hernando Caro is a 58 y.o. male.     History of Present Illness   A she was seen for congestive heart failure.  Patient was stable present time and is maintaining a weight of 180.  His atrial fibrillation is been controlled with warfarin and Toprol.  He does see a cardiologist for his warfarin.  Blood pressures running 120s over 80s.  His congestive heart failure has been stable his latest BNP was 9000.  His labs were repeated today and will follow-up in 4 days.  He will return to clinic in 3 month.    Dictated utilizing Dragon dictation. If there are questions or for further clarification, please contact me.   The following portions of the patient's history were reviewed and updated as appropriate: allergies, current medications, past family history, past medical history, past social history, past surgical history and problem list.    Review of Systems   Constitutional: Negative for fatigue and fever.   HENT: Positive for congestion. Negative for trouble swallowing.    Eyes: Negative for discharge and visual disturbance.   Respiratory: Negative for choking and shortness of breath.    Cardiovascular: Negative for chest pain and palpitations.   Gastrointestinal: Negative for abdominal pain and blood in stool.   Endocrine: Negative.    Genitourinary: Negative for genital sores and hematuria.   Musculoskeletal: Negative for gait problem and joint swelling.   Skin: Negative for color change, pallor, rash and wound.   Allergic/Immunologic: Positive for environmental allergies. Negative for immunocompromised state.   Neurological: Negative for facial asymmetry and speech difficulty.   Psychiatric/Behavioral: Negative for hallucinations and suicidal ideas.       Objective   Physical Exam   Constitutional: He is oriented to person, place, and time. He appears well-developed and well-nourished.   HENT:   Head: Normocephalic.   Eyes: Pupils are equal, round, and reactive to light. Conjunctivae are normal.    Neck: Normal range of motion. Neck supple.   Cardiovascular: Normal rate and normal heart sounds.    Pulmonary/Chest: Effort normal and breath sounds normal.   Abdominal: Soft. Bowel sounds are normal.   Musculoskeletal: Normal range of motion.   Neurological: He is alert and oriented to person, place, and time.   Skin: Skin is warm and dry.   Psychiatric: He has a normal mood and affect. His behavior is normal. Judgment and thought content normal.   Nursing note and vitals reviewed.      Assessment/Plan   Problems Addressed this Visit        Cardiovascular and Mediastinum    Essential hypertension    Relevant Orders    Basic Metabolic Panel    proBNP    Combined systolic and diastolic congestive heart failure (CMS/HCC) - Primary    Relevant Orders    Basic Metabolic Panel    proBNP    Persistent atrial fibrillation (CMS/HCC)    Relevant Orders    Basic Metabolic Panel    proBNP

## 2018-09-17 ENCOUNTER — HOSPITAL ENCOUNTER (OUTPATIENT)
Dept: CARDIOLOGY | Facility: HOSPITAL | Age: 58
Setting detail: RECURRING SERIES
Discharge: HOME | End: 2018-09-17

## 2018-09-17 ENCOUNTER — OFFICE VISIT (OUTPATIENT)
Dept: CARDIOLOGY | Facility: CLINIC | Age: 58
End: 2018-09-17

## 2018-09-17 VITALS
HEART RATE: 124 BPM | HEIGHT: 77 IN | SYSTOLIC BLOOD PRESSURE: 118 MMHG | WEIGHT: 200 LBS | DIASTOLIC BLOOD PRESSURE: 90 MMHG | BODY MASS INDEX: 23.62 KG/M2

## 2018-09-17 DIAGNOSIS — I34.0 MODERATE MITRAL REGURGITATION: Primary | ICD-10-CM

## 2018-09-17 DIAGNOSIS — I10 ESSENTIAL HYPERTENSION: ICD-10-CM

## 2018-09-17 DIAGNOSIS — I50.40 COMBINED SYSTOLIC AND DIASTOLIC CONGESTIVE HEART FAILURE, UNSPECIFIED CONGESTIVE HEART FAILURE CHRONICITY: ICD-10-CM

## 2018-09-17 PROCEDURE — 85610 PROTHROMBIN TIME: CPT

## 2018-09-17 PROCEDURE — 93000 ELECTROCARDIOGRAM COMPLETE: CPT | Performed by: INTERNAL MEDICINE

## 2018-09-17 PROCEDURE — 36416 COLLJ CAPILLARY BLOOD SPEC: CPT

## 2018-09-17 PROCEDURE — 99214 OFFICE O/P EST MOD 30 MIN: CPT | Performed by: INTERNAL MEDICINE

## 2018-09-18 ENCOUNTER — TELEPHONE (OUTPATIENT)
Dept: CARDIOLOGY | Facility: CLINIC | Age: 58
End: 2018-09-18

## 2018-09-18 NOTE — TELEPHONE ENCOUNTER
Pt called back to go over medications he is currently taking. I have updated his medication list in the computer so you can review them.....Yvette

## 2018-09-19 NOTE — TELEPHONE ENCOUNTER
Yvette and Yeni,  Per Dr. Mast he needs to be seen in the next week or two at the latest.  So I thought I would send this to you first before scheduling.    Thanks,  Mariela

## 2018-09-21 NOTE — PROGRESS NOTES
Subjective:     Encounter Date:06/12/2018      Patient ID: Hernando Caro is a 58 y.o. male.    Chief Complaint:  Cardiomyopathy   Associated symptoms include coughing and fatigue. Pertinent negatives include no chest pain.   Congestive Heart Failure   Presents for follow-up visit. Associated symptoms include edema, fatigue and shortness of breath. Pertinent negatives include no chest pain, chest pressure, muscle weakness, palpitations or paroxysmal nocturnal dyspnea. The symptoms have been stable.   Atrial Fibrillation   Presents for follow-up visit. Symptoms include shortness of breath. Symptoms are negative for chest pain and palpitations. The symptoms have been stable. Past medical history includes atrial fibrillation and CHF.   Hypertension   This is a chronic problem. The current episode started more than 1 year ago. The problem is controlled. Associated symptoms include shortness of breath. Pertinent negatives include no chest pain or palpitations.       Patient resents today for reevaluation.  Patient says he still very short of breath.  When he came in today he wasn't quite sure that he was taking medicines without any rales.  He was wearing compression stockings.  Please note he is still working 12 hour days.  He does complain of some fatigue as the day progresses..  He has been compliant with his medications.  His heart rate was a little bit increased today but he just walked in the heat.    Review of Systems   Constitution: Positive for fatigue.   Cardiovascular: Negative for chest pain and palpitations.   Respiratory: Positive for cough and shortness of breath.    Musculoskeletal: Negative for muscle weakness.         ECG 12 Lead  Date/Time: 9/17/2018 1:13 PM  Performed by: JANEEN HOOK  Authorized by: JANEEN HOOK   Comparison: compared with previous ECG from 7/24/2018  Similar to previous ECG  Rhythm: atrial fibrillation  Other findings: LVH with strain  Clinical impression: abnormal  ECG               Objective:     Physical Exam   Constitutional: He is oriented to person, place, and time. He appears well-developed.   HENT:   Head: Normocephalic.   Eyes: Conjunctivae are normal.   Neck: Normal range of motion. No JVD present.   Cardiovascular: Normal rate and normal heart sounds.  An irregularly irregular rhythm present.   Pulmonary/Chest: Breath sounds normal. He has no rales.   Abdominal: Soft. Bowel sounds are normal. He exhibits no distension.   Musculoskeletal: Normal range of motion. He exhibits no edema.   Neurological: He is alert and oriented to person, place, and time.   Skin: Skin is warm and dry.   Psychiatric: He has a normal mood and affect. His behavior is normal.   Vitals reviewed.      Lab Review:       Assessment:          Diagnosis Plan   1. Moderate mitral regurgitation     2. Essential hypertension     3. Combined systolic and diastolic congestive heart failure, unspecified congestive heart failure chronicity (CMS/HCC)            Plan:       1.  Cardiomyopathy.  Patient heart rate is still up a little bit.  I'm still concerned about his compliance.  He did call back he is on a large dose of beta blocker which I told him was imperative to take.  We'll see how he responds encourage exercise but he says with working 12 hour days just can't do that.  2.  Atrial fibrillation heart rates well-controlled  3.  Hypertension blood pressures good  4.  Mitral regurgitation moderate clinically doing well  5.  We'll have him reevaluated in 3 months sooner if issues    Atrial Fibrillation and Atrial Flutter  Assessment  • The patient has persistent atrial fibrillation  • This is valvular in etiology  • The patient's CHADS2-VASc score is 3  • A [id number] score of 2 or more is considered a high risk for a thromboembolic event  • Warfarin prescribed    Plan  • Continue in atrial fibrillation with rate control  • Continue warfarin for antithrombotic therapy, bleeding issues discussed  •  Continue beta blocker for rate control      Heart Failure  Assessment  • NYHA class III-A - There is limitation of physical activity. The patient is comfortable at rest, but ordinary activity causes fatigue, palpitations or shortness of breath.  • The patient was newly diagnosed with heart failure within the past 12 months  • ACE inhibitor not prescribed for medical reasons  • Beta blocker prescribed  • Diuretics prescribed  • Angiotensin receptor blocker (ARB) not prescribed for medical reasons  • Left ventricular function is moderately reduced by qualitative assessment    Plan  • The patient has received heart failure education on the following topics: dietary sodium restriction, medication instructions, minimizing or avoiding NSAID use, physical activity and minimizing alcohol intake    Subjective/Objective  • The patient reports dyspnea  • Physical exam findings positive for peripheral edema.   • Physical exam findings negative for rales and elevated JVP.

## 2018-09-21 NOTE — PROGRESS NOTES
Subjective:     Encounter Date:09/17/2018      Patient ID: Hernando Caro is a 58 y.o. male.    Chief Complaint:  History of Present Illness    {Common H&P Review Areas:46485}    ROS    Procedures       Objective:     Physical Exam    Lab Review:       Assessment:         No diagnosis found.       Plan:

## 2018-09-25 ENCOUNTER — OFFICE VISIT (OUTPATIENT)
Dept: CARDIOLOGY | Facility: CLINIC | Age: 58
End: 2018-09-25

## 2018-09-25 VITALS
HEART RATE: 98 BPM | WEIGHT: 199 LBS | DIASTOLIC BLOOD PRESSURE: 104 MMHG | HEIGHT: 77 IN | BODY MASS INDEX: 23.5 KG/M2 | SYSTOLIC BLOOD PRESSURE: 130 MMHG

## 2018-09-25 DIAGNOSIS — I48.19 PERSISTENT ATRIAL FIBRILLATION (HCC): Primary | ICD-10-CM

## 2018-09-25 PROCEDURE — 99214 OFFICE O/P EST MOD 30 MIN: CPT | Performed by: INTERNAL MEDICINE

## 2018-09-25 RX ORDER — AMIODARONE HYDROCHLORIDE 200 MG/1
TABLET ORAL
Qty: 90 TABLET | Refills: 1 | Status: SHIPPED | OUTPATIENT
Start: 2018-09-25 | End: 2018-09-25 | Stop reason: ALTCHOICE

## 2018-09-27 PROCEDURE — 93000 ELECTROCARDIOGRAM COMPLETE: CPT | Performed by: INTERNAL MEDICINE

## 2018-09-27 NOTE — PROGRESS NOTES
Date of Office Visit: 2018  Encounter Provider: Gil Davis MD  Place of Service: [facility] CARDIOLOGY  Patient Name: Hernando Caro  :1960    Chief Complaint   Patient presents with   • Atrial Fibrillation     New Patient Consult /  ref   :     HPI: Hernando Caro is a 58 y.o. male who presents today for symptomatic atrial fibrillation and nonischemic systolic cardiomyopathy.  Since the patient, has gotten his diagnosis, he has gone back to work.  He is having NYHA class II symptoms.  His ejection fraction was 30% in May.  He was never known to have atrial fibrillation prior to April, but he did not have extensive healthcare contact.  He complains of ongoing fatigue.         Past Medical History:   Diagnosis Date   • Bronchitis        • Cardiomyopathy (CMS/HCC)    • Chronic systolic CHF (congestive heart failure) (CMS/HCC)    • CKD (chronic kidney disease)    • Ejection fraction < 50%    • Essential hypertension    • Mild CAD     Non-obstructive   • Mitral regurgitation    • Persistent atrial fibrillation (CMS/HCC)     flutter   • SOB (shortness of breath)        Past Surgical History:   Procedure Laterality Date   • BACK SURGERY     • CARDIAC CATHETERIZATION N/A 4/3/2018    Procedure: Coronary angiography;  Surgeon: Geovany Guadalupe MD;  Location: [facility] INVASIVE LOCATION;  Service: Cardiovascular   • CARDIAC CATHETERIZATION N/A 4/3/2018    Procedure: Left heart cath;  Surgeon: Geovany Guadalupe MD;  Location: [facility] INVASIVE LOCATION;  Service: Cardiovascular   • CARDIAC CATHETERIZATION N/A 4/3/2018    Procedure: Left ventriculography;  Surgeon: Geovany Guadalupe MD;  Location: [facility] INVASIVE LOCATION;  Service: Cardiovascular   • CARDIAC CATHETERIZATION N/A 4/3/2018    Procedure: Right heart cath;  Surgeon: Geovany Guadalupe MD;  Location: [facility] CATH INVASIVE LOCATION;  Service: Cardiovascular       Social History     Social  History   • Marital status: Single     Spouse name: N/A   • Number of children: N/A   • Years of education: N/A     Occupational History   • Not on file.     Social History Main Topics   • Smoking status: Former Smoker     Packs/day: 0.00     Years: 0.00     Quit date: 4/6/2018   • Smokeless tobacco: Never Used      Comment: caffeine 1 cup day   • Alcohol use Yes      Comment: occasionial    • Drug use: No   • Sexual activity: Yes     Other Topics Concern   • Not on file     Social History Narrative    ** Merged History Encounter **            Family History   Problem Relation Age of Onset   • Congenital heart disease Brother    • Hypertension Mother    • Thyroid disease Mother    • Lung disease Father    • No Known Problems Maternal Grandmother    • No Known Problems Maternal Grandfather    • No Known Problems Paternal Grandmother    • No Known Problems Paternal Grandfather    • Allergic rhinitis Sister    • Hypertension Brother    • No Known Problems Brother    • No Known Problems Brother        Review of Systems   Constitution: Negative for weakness and malaise/fatigue.   HENT: Negative.    Eyes: Negative.    Cardiovascular: Negative for chest pain, dyspnea on exertion, leg swelling and near-syncope.   Respiratory: Negative for cough and shortness of breath.    Endocrine: Negative.    Hematologic/Lymphatic: Negative.    Skin: Negative.    Musculoskeletal: Negative.    Gastrointestinal: Negative.    Genitourinary: Negative.    Neurological: Negative.    Psychiatric/Behavioral: Negative.    Allergic/Immunologic: Negative.        Allergies   Allergen Reactions   • Ace Inhibitors Swelling   • Lisinopril Angioedema         Current Outpatient Prescriptions:   •  albuterol (PROVENTIL HFA;VENTOLIN HFA) 108 (90 Base) MCG/ACT inhaler, Inhale 2 puffs Every 4 (Four) Hours As Needed for Wheezing or Shortness of Air., Disp: 1 inhaler, Rfl: 5  •  atorvastatin (LIPITOR) 10 MG tablet, Take 1 tablet by mouth Every Night., Disp: 30  "tablet, Rfl: 3  •  furosemide (LASIX) 20 MG tablet, TAKE 4 TABLETS BY MOUTH IN THE MORNING THEN 2 TABLETS IN THE EVENING OR AS DIRECTED., Disp: 540 tablet, Rfl: 0  •  metoprolol succinate XL (TOPROL-XL) 100 MG 24 hr tablet, Take 1 tablet by mouth Every 12 (Twelve) Hours., Disp: 60 tablet, Rfl: 3  •  potassium chloride (K-DUR) 10 MEQ CR tablet, Take 1 tablet by mouth Daily., Disp: 30 tablet, Rfl: 11  •  warfarin (COUMADIN) 5 MG tablet, Take 1 tablet by mouth Daily. (Patient taking differently: Take 5 mg by mouth Daily. 5 mg. On mondays, 2.5 mg. Daily except mondays.), Disp: 30 tablet, Rfl: 3      Objective:     Vitals:    09/25/18 1104   BP: (!) 130/104   Pulse: 98   Weight: 90.3 kg (199 lb)   Height: 195.6 cm (77\")     Body mass index is 23.6 kg/m².    PHYSICAL EXAM:    Physical Exam   Constitutional: He appears well-developed.   HENT:   Head: Normocephalic and atraumatic.   Eyes: Conjunctivae are normal. Right eye exhibits no discharge. Left eye exhibits no discharge.   Neck: Neck supple. No JVD present.   Cardiovascular: Normal rate and normal heart sounds.  An irregularly irregular rhythm present.   Noticeable JVD   Pulmonary/Chest: No respiratory distress.   Abdominal: He exhibits no distension. There is no tenderness.   Musculoskeletal: Normal range of motion. He exhibits no edema.   Neurological: No cranial nerve deficit.   Nursing note and vitals reviewed.          ECG 12 Lead  Date/Time: 9/27/2018 7:09 PM  Performed by: PATRICIA ROMERO  Authorized by: PATRICIA ROMERO   Rhythm: atrial fibrillation  Clinical impression: abnormal ECG              Assessment:       Diagnosis Plan   1. Persistent atrial fibrillation (CMS/HCC)  Cardioversion External in Cardiology Department          Plan:       I would like to cardiovert him to sinus rhythm.  He will likely need antiarrhymic medication and I was going to prescribe him amiodarone, but his INR has not been therapeutic.  We are going to work to get his " INR's in range and then try cardioversion.  He made need an ICD as well, but will need reassess of his heart failure treatment and ejection fraction.  His INR was essentially normal so there may be noncompliance issues.    As always, it has been a pleasure to participate in your patient's care.      Sincerely,         Gil Davis MD

## 2018-09-28 ENCOUNTER — HOSPITAL ENCOUNTER (INPATIENT)
Facility: HOSPITAL | Age: 58
LOS: 4 days | Discharge: HOME | End: 2018-10-02
Attending: EMERGENCY MEDICINE | Admitting: INTERNAL MEDICINE

## 2018-09-28 ENCOUNTER — APPOINTMENT (OUTPATIENT)
Dept: GENERAL RADIOLOGY | Facility: HOSPITAL | Age: 58
End: 2018-09-28

## 2018-09-28 ENCOUNTER — APPOINTMENT (OUTPATIENT)
Dept: ULTRASOUND IMAGING | Facility: HOSPITAL | Age: 58
End: 2018-09-28

## 2018-09-28 DIAGNOSIS — J90 PLEURAL EFFUSION ON RIGHT: Primary | ICD-10-CM

## 2018-09-28 DIAGNOSIS — R06.00 DYSPNEA, UNSPECIFIED TYPE: ICD-10-CM

## 2018-09-28 DIAGNOSIS — I50.9 ACUTE CONGESTIVE HEART FAILURE, UNSPECIFIED CONGESTIVE HEART FAILURE TYPE: ICD-10-CM

## 2018-09-28 LAB
ALBUMIN FLD-MCNC: 2.1 G/DL
ALBUMIN SERPL-MCNC: 3.9 G/DL (ref 3.5–5.2)
ALBUMIN/GLOB SERPL: 1.1 G/DL
ALP SERPL-CCNC: 273 U/L (ref 39–117)
ALT SERPL W P-5'-P-CCNC: 30 U/L (ref 1–41)
ANION GAP SERPL CALCULATED.3IONS-SCNC: 13.3 MMOL/L
ANION GAP SERPL CALCULATED.3IONS-SCNC: 14.7 MMOL/L
ANION GAP SERPL CALCULATED.3IONS-SCNC: 22.9 MMOL/L
APPEARANCE FLD: ABNORMAL
APTT PPP: 37.8 SECONDS (ref 22.7–35.4)
AST SERPL-CCNC: 46 U/L (ref 1–40)
BACTERIA UR QL AUTO: ABNORMAL /HPF
BASOPHILS # BLD AUTO: 0.04 10*3/MM3 (ref 0–0.2)
BASOPHILS NFR BLD AUTO: 0.5 % (ref 0–1.5)
BILIRUB SERPL-MCNC: 2.9 MG/DL (ref 0.1–1.2)
BILIRUB UR QL STRIP: ABNORMAL
BUN BLD-MCNC: 22 MG/DL (ref 6–20)
BUN BLD-MCNC: 29 MG/DL (ref 6–20)
BUN BLD-MCNC: 32 MG/DL (ref 6–20)
BUN/CREAT SERPL: 13.7 (ref 7–25)
BUN/CREAT SERPL: 14.9 (ref 7–25)
BUN/CREAT SERPL: 15.8 (ref 7–25)
CALCIUM SPEC-SCNC: 8.8 MG/DL (ref 8.6–10.5)
CALCIUM SPEC-SCNC: 9.3 MG/DL (ref 8.6–10.5)
CALCIUM SPEC-SCNC: 9.7 MG/DL (ref 8.6–10.5)
CHLORIDE SERPL-SCNC: 80 MMOL/L (ref 98–107)
CHLORIDE SERPL-SCNC: 81 MMOL/L (ref 98–107)
CHLORIDE SERPL-SCNC: 83 MMOL/L (ref 98–107)
CLARITY UR: CLEAR
CO2 SERPL-SCNC: 19.1 MMOL/L (ref 22–29)
CO2 SERPL-SCNC: 26.3 MMOL/L (ref 22–29)
CO2 SERPL-SCNC: 27.7 MMOL/L (ref 22–29)
COLOR FLD: YELLOW
COLOR UR: ABNORMAL
CREAT BLD-MCNC: 1.61 MG/DL (ref 0.76–1.27)
CREAT BLD-MCNC: 1.94 MG/DL (ref 0.76–1.27)
CREAT BLD-MCNC: 2.03 MG/DL (ref 0.76–1.27)
CREAT UR-MCNC: 124.5 MG/DL
DEPRECATED RDW RBC AUTO: 52.3 FL (ref 37–54)
EOSINOPHIL # BLD AUTO: 0.1 10*3/MM3 (ref 0–0.7)
EOSINOPHIL NFR BLD AUTO: 1.3 % (ref 0.3–6.2)
ERYTHROCYTE [DISTWIDTH] IN BLOOD BY AUTOMATED COUNT: 16 % (ref 11.5–14.5)
GFR SERPL CREATININE-BSD FRML MDRD: 41 ML/MIN/1.73
GFR SERPL CREATININE-BSD FRML MDRD: 43 ML/MIN/1.73
GFR SERPL CREATININE-BSD FRML MDRD: 54 ML/MIN/1.73
GLOBULIN UR ELPH-MCNC: 3.4 GM/DL
GLUCOSE BLD-MCNC: 108 MG/DL (ref 65–99)
GLUCOSE BLD-MCNC: 113 MG/DL (ref 65–99)
GLUCOSE BLD-MCNC: 97 MG/DL (ref 65–99)
GLUCOSE FLD-MCNC: 97 MG/DL
GLUCOSE UR STRIP-MCNC: NEGATIVE MG/DL
HCT VFR BLD AUTO: 44.7 % (ref 40.4–52.2)
HGB BLD-MCNC: 15.3 G/DL (ref 13.7–17.6)
HGB UR QL STRIP.AUTO: NEGATIVE
HYALINE CASTS UR QL AUTO: ABNORMAL /LPF
IMM GRANULOCYTES # BLD: 0.04 10*3/MM3 (ref 0–0.03)
IMM GRANULOCYTES NFR BLD: 0.5 % (ref 0–0.5)
INR PPP: 1.66 (ref 0.9–1.1)
INR PPP: 1.82 (ref 0.9–1.1)
KETONES UR QL STRIP: NEGATIVE
LDH FLD-CCNC: 100 U/L
LEUKOCYTE ESTERASE UR QL STRIP.AUTO: ABNORMAL
LYMPHOCYTES # BLD AUTO: 1.42 10*3/MM3 (ref 0.9–4.8)
LYMPHOCYTES NFR BLD AUTO: 19 % (ref 19.6–45.3)
LYMPHOCYTES NFR FLD MANUAL: 41 %
MCH RBC QN AUTO: 30.8 PG (ref 27–32.7)
MCHC RBC AUTO-ENTMCNC: 34.2 G/DL (ref 32.6–36.4)
MCV RBC AUTO: 89.9 FL (ref 79.8–96.2)
MONOCYTES # BLD AUTO: 0.69 10*3/MM3 (ref 0.2–1.2)
MONOCYTES NFR BLD AUTO: 9.2 % (ref 5–12)
MONOCYTES NFR FLD: 4 %
MONOS+MACROS NFR FLD: 34 %
MUCOUS THREADS URNS QL MICRO: ABNORMAL /HPF
NEUTROPHILS # BLD AUTO: 5.17 10*3/MM3 (ref 1.9–8.1)
NEUTROPHILS NFR BLD AUTO: 69.5 % (ref 42.7–76)
NEUTROPHILS NFR FLD MANUAL: 21 %
NITRITE UR QL STRIP: NEGATIVE
NT-PROBNP SERPL-MCNC: 7776 PG/ML (ref 0–900)
PH UR STRIP.AUTO: <=5 [PH] (ref 5–8)
PLATELET # BLD AUTO: 191 10*3/MM3 (ref 140–500)
PMV BLD AUTO: 10.2 FL (ref 6–12)
POTASSIUM BLD-SCNC: 4.8 MMOL/L (ref 3.5–5.2)
POTASSIUM BLD-SCNC: 4.9 MMOL/L (ref 3.5–5.2)
POTASSIUM BLD-SCNC: 5.5 MMOL/L (ref 3.5–5.2)
PROT FLD-MCNC: 3.8 G/DL
PROT SERPL-MCNC: 7.3 G/DL (ref 6–8.5)
PROT UR QL STRIP: ABNORMAL
PROTHROMBIN TIME: 19.3 SECONDS (ref 11.7–14.2)
PROTHROMBIN TIME: 20.8 SECONDS (ref 11.7–14.2)
RBC # BLD AUTO: 4.97 10*6/MM3 (ref 4.6–6)
RBC # FLD AUTO: 580 /MM3
RBC # UR: ABNORMAL /HPF
REF LAB TEST METHOD: ABNORMAL
SODIUM BLD-SCNC: 121 MMOL/L (ref 136–145)
SODIUM BLD-SCNC: 123 MMOL/L (ref 136–145)
SODIUM BLD-SCNC: 124 MMOL/L (ref 136–145)
SP GR UR STRIP: 1.02 (ref 1–1.03)
SQUAMOUS #/AREA URNS HPF: ABNORMAL /HPF
TROPONIN T SERPL-MCNC: 0.02 NG/ML (ref 0–0.03)
TROPONIN T SERPL-MCNC: 0.03 NG/ML (ref 0–0.03)
TSH SERPL DL<=0.05 MIU/L-ACNC: 4.71 MIU/ML (ref 0.27–4.2)
UROBILINOGEN UR QL STRIP: ABNORMAL
WBC # FLD: 510 /MM3
WBC NRBC COR # BLD: 7.46 10*3/MM3 (ref 4.5–10.7)
WBC UR QL AUTO: ABNORMAL /HPF

## 2018-09-28 PROCEDURE — 84484 ASSAY OF TROPONIN QUANT: CPT | Performed by: HOSPITALIST

## 2018-09-28 PROCEDURE — 88305 TISSUE EXAM BY PATHOLOGIST: CPT | Performed by: INTERNAL MEDICINE

## 2018-09-28 PROCEDURE — 83880 ASSAY OF NATRIURETIC PEPTIDE: CPT | Performed by: EMERGENCY MEDICINE

## 2018-09-28 PROCEDURE — 89051 BODY FLUID CELL COUNT: CPT | Performed by: INTERNAL MEDICINE

## 2018-09-28 PROCEDURE — 99285 EMERGENCY DEPT VISIT HI MDM: CPT

## 2018-09-28 PROCEDURE — 25010000002 FUROSEMIDE PER 20 MG: Performed by: EMERGENCY MEDICINE

## 2018-09-28 PROCEDURE — 84300 ASSAY OF URINE SODIUM: CPT | Performed by: INTERNAL MEDICINE

## 2018-09-28 PROCEDURE — 94799 UNLISTED PULMONARY SVC/PX: CPT

## 2018-09-28 PROCEDURE — 84443 ASSAY THYROID STIM HORMONE: CPT | Performed by: HOSPITALIST

## 2018-09-28 PROCEDURE — 81001 URINALYSIS AUTO W/SCOPE: CPT | Performed by: INTERNAL MEDICINE

## 2018-09-28 PROCEDURE — 71046 X-RAY EXAM CHEST 2 VIEWS: CPT

## 2018-09-28 PROCEDURE — 85610 PROTHROMBIN TIME: CPT | Performed by: EMERGENCY MEDICINE

## 2018-09-28 PROCEDURE — 83615 LACTATE (LD) (LDH) ENZYME: CPT | Performed by: INTERNAL MEDICINE

## 2018-09-28 PROCEDURE — 84156 ASSAY OF PROTEIN URINE: CPT | Performed by: INTERNAL MEDICINE

## 2018-09-28 PROCEDURE — 82945 GLUCOSE OTHER FLUID: CPT | Performed by: INTERNAL MEDICINE

## 2018-09-28 PROCEDURE — 87205 SMEAR GRAM STAIN: CPT | Performed by: INTERNAL MEDICINE

## 2018-09-28 PROCEDURE — 84157 ASSAY OF PROTEIN OTHER: CPT | Performed by: INTERNAL MEDICINE

## 2018-09-28 PROCEDURE — 82042 OTHER SOURCE ALBUMIN QUAN EA: CPT | Performed by: INTERNAL MEDICINE

## 2018-09-28 PROCEDURE — 0W993ZZ DRAINAGE OF RIGHT PLEURAL CAVITY, PERCUTANEOUS APPROACH: ICD-10-PCS | Performed by: RADIOLOGY

## 2018-09-28 PROCEDURE — 85025 COMPLETE CBC W/AUTO DIFF WBC: CPT | Performed by: EMERGENCY MEDICINE

## 2018-09-28 PROCEDURE — 87070 CULTURE OTHR SPECIMN AEROBIC: CPT | Performed by: INTERNAL MEDICINE

## 2018-09-28 PROCEDURE — 76942 ECHO GUIDE FOR BIOPSY: CPT

## 2018-09-28 PROCEDURE — 88112 CYTOPATH CELL ENHANCE TECH: CPT | Performed by: INTERNAL MEDICINE

## 2018-09-28 PROCEDURE — 80048 BASIC METABOLIC PNL TOTAL CA: CPT | Performed by: HOSPITALIST

## 2018-09-28 PROCEDURE — 84484 ASSAY OF TROPONIN QUANT: CPT | Performed by: EMERGENCY MEDICINE

## 2018-09-28 PROCEDURE — 25010000002 FUROSEMIDE PER 20 MG: Performed by: INTERNAL MEDICINE

## 2018-09-28 PROCEDURE — 85730 THROMBOPLASTIN TIME PARTIAL: CPT | Performed by: EMERGENCY MEDICINE

## 2018-09-28 PROCEDURE — 80053 COMPREHEN METABOLIC PANEL: CPT | Performed by: EMERGENCY MEDICINE

## 2018-09-28 PROCEDURE — 93005 ELECTROCARDIOGRAM TRACING: CPT | Performed by: EMERGENCY MEDICINE

## 2018-09-28 PROCEDURE — 94640 AIRWAY INHALATION TREATMENT: CPT

## 2018-09-28 PROCEDURE — 82570 ASSAY OF URINE CREATININE: CPT | Performed by: INTERNAL MEDICINE

## 2018-09-28 PROCEDURE — 87015 SPECIMEN INFECT AGNT CONCNTJ: CPT | Performed by: INTERNAL MEDICINE

## 2018-09-28 PROCEDURE — 85610 PROTHROMBIN TIME: CPT | Performed by: HOSPITALIST

## 2018-09-28 RX ORDER — ONDANSETRON 4 MG/1
4 TABLET, ORALLY DISINTEGRATING ORAL EVERY 6 HOURS PRN
Status: DISCONTINUED | OUTPATIENT
Start: 2018-09-28 | End: 2018-10-02 | Stop reason: HOSPADM

## 2018-09-28 RX ORDER — SODIUM CHLORIDE 0.9 % (FLUSH) 0.9 %
1-10 SYRINGE (ML) INJECTION AS NEEDED
Status: DISCONTINUED | OUTPATIENT
Start: 2018-09-28 | End: 2018-10-02 | Stop reason: HOSPADM

## 2018-09-28 RX ORDER — SODIUM CHLORIDE 0.9 % (FLUSH) 0.9 %
10 SYRINGE (ML) INJECTION AS NEEDED
Status: DISCONTINUED | OUTPATIENT
Start: 2018-09-28 | End: 2018-10-02 | Stop reason: HOSPADM

## 2018-09-28 RX ORDER — IPRATROPIUM BROMIDE AND ALBUTEROL SULFATE 2.5; .5 MG/3ML; MG/3ML
3 SOLUTION RESPIRATORY (INHALATION)
Status: DISCONTINUED | OUTPATIENT
Start: 2018-09-28 | End: 2018-10-02 | Stop reason: HOSPADM

## 2018-09-28 RX ORDER — NITROGLYCERIN 0.4 MG/1
0.4 TABLET SUBLINGUAL
Status: DISCONTINUED | OUTPATIENT
Start: 2018-09-28 | End: 2018-10-02 | Stop reason: HOSPADM

## 2018-09-28 RX ORDER — ONDANSETRON 4 MG/1
4 TABLET, FILM COATED ORAL EVERY 6 HOURS PRN
Status: DISCONTINUED | OUTPATIENT
Start: 2018-09-28 | End: 2018-10-02 | Stop reason: HOSPADM

## 2018-09-28 RX ORDER — ALBUTEROL SULFATE 2.5 MG/3ML
2.5 SOLUTION RESPIRATORY (INHALATION) EVERY 6 HOURS PRN
Status: DISCONTINUED | OUTPATIENT
Start: 2018-09-28 | End: 2018-10-02 | Stop reason: HOSPADM

## 2018-09-28 RX ORDER — LIDOCAINE HYDROCHLORIDE 10 MG/ML
20 INJECTION, SOLUTION INFILTRATION; PERINEURAL ONCE
Status: COMPLETED | OUTPATIENT
Start: 2018-09-28 | End: 2018-09-28

## 2018-09-28 RX ORDER — ATORVASTATIN CALCIUM 10 MG/1
10 TABLET, FILM COATED ORAL NIGHTLY
Status: DISCONTINUED | OUTPATIENT
Start: 2018-09-28 | End: 2018-10-02 | Stop reason: HOSPADM

## 2018-09-28 RX ORDER — ACETAMINOPHEN 325 MG/1
650 TABLET ORAL EVERY 4 HOURS PRN
Status: DISCONTINUED | OUTPATIENT
Start: 2018-09-28 | End: 2018-10-02 | Stop reason: HOSPADM

## 2018-09-28 RX ORDER — HYDROCODONE BITARTRATE AND ACETAMINOPHEN 5; 325 MG/1; MG/1
1 TABLET ORAL EVERY 4 HOURS PRN
Status: DISCONTINUED | OUTPATIENT
Start: 2018-09-28 | End: 2018-10-02 | Stop reason: HOSPADM

## 2018-09-28 RX ORDER — FUROSEMIDE 10 MG/ML
40 INJECTION INTRAMUSCULAR; INTRAVENOUS ONCE
Status: COMPLETED | OUTPATIENT
Start: 2018-09-28 | End: 2018-09-28

## 2018-09-28 RX ORDER — FUROSEMIDE 10 MG/ML
80 INJECTION INTRAMUSCULAR; INTRAVENOUS EVERY 12 HOURS
Status: COMPLETED | OUTPATIENT
Start: 2018-09-28 | End: 2018-09-29

## 2018-09-28 RX ORDER — ECHINACEA PURPUREA EXTRACT 125 MG
1 TABLET ORAL AS NEEDED
Status: DISCONTINUED | OUTPATIENT
Start: 2018-09-28 | End: 2018-10-02 | Stop reason: HOSPADM

## 2018-09-28 RX ORDER — METOPROLOL SUCCINATE 100 MG/1
100 TABLET, EXTENDED RELEASE ORAL EVERY 12 HOURS SCHEDULED
Status: DISCONTINUED | OUTPATIENT
Start: 2018-09-28 | End: 2018-09-28

## 2018-09-28 RX ORDER — METOPROLOL SUCCINATE 100 MG/1
100 TABLET, EXTENDED RELEASE ORAL EVERY 12 HOURS SCHEDULED
Status: DISCONTINUED | OUTPATIENT
Start: 2018-09-28 | End: 2018-10-02 | Stop reason: HOSPADM

## 2018-09-28 RX ORDER — ONDANSETRON 2 MG/ML
4 INJECTION INTRAMUSCULAR; INTRAVENOUS EVERY 6 HOURS PRN
Status: DISCONTINUED | OUTPATIENT
Start: 2018-09-28 | End: 2018-10-02 | Stop reason: HOSPADM

## 2018-09-28 RX ADMIN — METOPROLOL SUCCINATE 100 MG: 100 TABLET, FILM COATED, EXTENDED RELEASE ORAL at 07:57

## 2018-09-28 RX ADMIN — ATORVASTATIN CALCIUM 10 MG: 10 TABLET, FILM COATED ORAL at 20:37

## 2018-09-28 RX ADMIN — SALINE NASAL SPRAY 1 SPRAY: 1.5 SOLUTION NASAL at 11:33

## 2018-09-28 RX ADMIN — FUROSEMIDE 80 MG: 10 INJECTION, SOLUTION INTRAMUSCULAR; INTRAVENOUS at 20:38

## 2018-09-28 RX ADMIN — LIDOCAINE HYDROCHLORIDE 20 ML: 10 INJECTION, SOLUTION INFILTRATION; PERINEURAL at 16:45

## 2018-09-28 RX ADMIN — IPRATROPIUM BROMIDE AND ALBUTEROL SULFATE 3 ML: .5; 3 SOLUTION RESPIRATORY (INHALATION) at 15:33

## 2018-09-28 RX ADMIN — METOPROLOL SUCCINATE 100 MG: 100 TABLET, FILM COATED, EXTENDED RELEASE ORAL at 20:38

## 2018-09-28 RX ADMIN — IPRATROPIUM BROMIDE AND ALBUTEROL SULFATE 3 ML: .5; 3 SOLUTION RESPIRATORY (INHALATION) at 19:15

## 2018-09-28 RX ADMIN — FUROSEMIDE 40 MG: 10 INJECTION, SOLUTION INTRAMUSCULAR; INTRAVENOUS at 03:03

## 2018-09-28 RX ADMIN — IPRATROPIUM BROMIDE AND ALBUTEROL SULFATE 3 ML: .5; 3 SOLUTION RESPIRATORY (INHALATION) at 07:02

## 2018-09-28 RX ADMIN — IPRATROPIUM BROMIDE AND ALBUTEROL SULFATE 3 ML: .5; 3 SOLUTION RESPIRATORY (INHALATION) at 11:03

## 2018-09-29 ENCOUNTER — APPOINTMENT (OUTPATIENT)
Dept: ULTRASOUND IMAGING | Facility: HOSPITAL | Age: 58
End: 2018-09-29

## 2018-09-29 LAB
ALBUMIN SERPL-MCNC: 3.2 G/DL (ref 3.5–5.2)
ANION GAP SERPL CALCULATED.3IONS-SCNC: 11 MMOL/L
BASOPHILS # BLD AUTO: 0.02 10*3/MM3 (ref 0–0.2)
BASOPHILS NFR BLD AUTO: 0.2 % (ref 0–1.5)
BUN BLD-MCNC: 33 MG/DL (ref 6–20)
BUN/CREAT SERPL: 18 (ref 7–25)
CALCIUM SPEC-SCNC: 9.1 MG/DL (ref 8.6–10.5)
CHLORIDE SERPL-SCNC: 85 MMOL/L (ref 98–107)
CO2 SERPL-SCNC: 26 MMOL/L (ref 22–29)
CREAT BLD-MCNC: 1.83 MG/DL (ref 0.76–1.27)
DEPRECATED RDW RBC AUTO: 50.3 FL (ref 37–54)
EOSINOPHIL # BLD AUTO: 0.06 10*3/MM3 (ref 0–0.7)
EOSINOPHIL NFR BLD AUTO: 0.7 % (ref 0.3–6.2)
ERYTHROCYTE [DISTWIDTH] IN BLOOD BY AUTOMATED COUNT: 15.9 % (ref 11.5–14.5)
GFR SERPL CREATININE-BSD FRML MDRD: 46 ML/MIN/1.73
GLUCOSE BLD-MCNC: 100 MG/DL (ref 65–99)
HCT VFR BLD AUTO: 44.8 % (ref 40.4–52.2)
HGB BLD-MCNC: 15.6 G/DL (ref 13.7–17.6)
IMM GRANULOCYTES # BLD: 0.03 10*3/MM3 (ref 0–0.03)
IMM GRANULOCYTES NFR BLD: 0.3 % (ref 0–0.5)
INR PPP: 1.9 (ref 0.9–1.1)
LYMPHOCYTES # BLD AUTO: 1.89 10*3/MM3 (ref 0.9–4.8)
LYMPHOCYTES NFR BLD AUTO: 21.1 % (ref 19.6–45.3)
MAGNESIUM SERPL-MCNC: 2.1 MG/DL (ref 1.6–2.6)
MCH RBC QN AUTO: 30.5 PG (ref 27–32.7)
MCHC RBC AUTO-ENTMCNC: 34.8 G/DL (ref 32.6–36.4)
MCV RBC AUTO: 87.7 FL (ref 79.8–96.2)
MONOCYTES # BLD AUTO: 0.47 10*3/MM3 (ref 0.2–1.2)
MONOCYTES NFR BLD AUTO: 5.3 % (ref 5–12)
NEUTROPHILS # BLD AUTO: 6.48 10*3/MM3 (ref 1.9–8.1)
NEUTROPHILS NFR BLD AUTO: 72.4 % (ref 42.7–76)
PHOSPHATE SERPL-MCNC: 4.2 MG/DL (ref 2.5–4.5)
PLATELET # BLD AUTO: 247 10*3/MM3 (ref 140–500)
PMV BLD AUTO: 11.3 FL (ref 6–12)
POTASSIUM BLD-SCNC: 4.5 MMOL/L (ref 3.5–5.2)
PROT UR-MCNC: 38 MG/DL
PROTHROMBIN TIME: 21.5 SECONDS (ref 11.7–14.2)
RBC # BLD AUTO: 5.11 10*6/MM3 (ref 4.6–6)
SODIUM BLD-SCNC: 122 MMOL/L (ref 136–145)
SODIUM UR-SCNC: <20 MMOL/L
WBC NRBC COR # BLD: 8.95 10*3/MM3 (ref 4.5–10.7)

## 2018-09-29 PROCEDURE — 83735 ASSAY OF MAGNESIUM: CPT | Performed by: INTERNAL MEDICINE

## 2018-09-29 PROCEDURE — 85610 PROTHROMBIN TIME: CPT | Performed by: HOSPITALIST

## 2018-09-29 PROCEDURE — 25010000002 FUROSEMIDE PER 20 MG: Performed by: INTERNAL MEDICINE

## 2018-09-29 PROCEDURE — 85025 COMPLETE CBC W/AUTO DIFF WBC: CPT | Performed by: HOSPITALIST

## 2018-09-29 PROCEDURE — G0008 ADMIN INFLUENZA VIRUS VAC: HCPCS | Performed by: HOSPITALIST

## 2018-09-29 PROCEDURE — 94799 UNLISTED PULMONARY SVC/PX: CPT

## 2018-09-29 PROCEDURE — 25010000002 INFLUENZA VAC SUBUNIT QUAD 0.5 ML SUSPENSION PREFILLED SYRINGE: Performed by: HOSPITALIST

## 2018-09-29 PROCEDURE — 99254 IP/OBS CNSLTJ NEW/EST MOD 60: CPT | Performed by: INTERNAL MEDICINE

## 2018-09-29 PROCEDURE — 90661 CCIIV3 VAC ABX FR 0.5 ML IM: CPT | Performed by: HOSPITALIST

## 2018-09-29 PROCEDURE — 76700 US EXAM ABDOM COMPLETE: CPT

## 2018-09-29 PROCEDURE — 80069 RENAL FUNCTION PANEL: CPT | Performed by: INTERNAL MEDICINE

## 2018-09-29 RX ORDER — CALCIUM CARBONATE 200(500)MG
2 TABLET,CHEWABLE ORAL EVERY 6 HOURS PRN
Status: DISCONTINUED | OUTPATIENT
Start: 2018-09-29 | End: 2018-10-02 | Stop reason: HOSPADM

## 2018-09-29 RX ORDER — FUROSEMIDE 10 MG/ML
80 INJECTION INTRAMUSCULAR; INTRAVENOUS EVERY 6 HOURS
Status: COMPLETED | OUTPATIENT
Start: 2018-09-30 | End: 2018-09-30

## 2018-09-29 RX ORDER — CALCIUM CARBONATE 200(500)MG
1 TABLET,CHEWABLE ORAL EVERY 6 HOURS PRN
Status: DISCONTINUED | OUTPATIENT
Start: 2018-09-29 | End: 2018-10-02 | Stop reason: HOSPADM

## 2018-09-29 RX ORDER — WARFARIN SODIUM 5 MG/1
5 TABLET ORAL
Status: DISCONTINUED | OUTPATIENT
Start: 2018-09-29 | End: 2018-09-30

## 2018-09-29 RX ADMIN — INFLUENZA A VIRUS A/SINGAPORE/GP1908/2015 IVR-180 (H1N1) ANTIGEN (MDCK CELL DERIVED, PROPIOLACTONE INACTIVATED), INFLUENZA A VIRUS A/NORTH CAROLINA/04/2016 (H3N2) HEMAGGLUTININ ANTIGEN (MDCK CELL DERIVED, PROPIOLACTONE INACTIVATED), INFLUENZA B VIRUS B/IOWA/06/2017 HEMAGGLUTININ ANTIGEN (MDCK CELL DERIVED, PROPIOLACTONE INACTIVATED), INFLUENZA B VIRUS B/SINGAPORE/INFTT-16-0610/2016 HEMAGGLUTININ ANTIGEN (MDCK CELL DERIVED, PROPIOLACTONE INACTIVATED) 0.5 ML: 15; 15; 15; 15 INJECTION, SUSPENSION INTRAMUSCULAR at 15:33

## 2018-09-29 RX ADMIN — ATORVASTATIN CALCIUM 10 MG: 10 TABLET, FILM COATED ORAL at 20:38

## 2018-09-29 RX ADMIN — WARFARIN SODIUM 5 MG: 5 TABLET ORAL at 17:59

## 2018-09-29 RX ADMIN — METOPROLOL SUCCINATE 100 MG: 100 TABLET, FILM COATED, EXTENDED RELEASE ORAL at 08:49

## 2018-09-29 RX ADMIN — Medication 2 TABLET: at 06:54

## 2018-09-29 RX ADMIN — METOPROLOL SUCCINATE 100 MG: 100 TABLET, FILM COATED, EXTENDED RELEASE ORAL at 20:38

## 2018-09-29 RX ADMIN — IPRATROPIUM BROMIDE AND ALBUTEROL SULFATE 3 ML: .5; 3 SOLUTION RESPIRATORY (INHALATION) at 07:24

## 2018-09-29 RX ADMIN — IPRATROPIUM BROMIDE AND ALBUTEROL SULFATE 3 ML: .5; 3 SOLUTION RESPIRATORY (INHALATION) at 20:43

## 2018-09-29 RX ADMIN — FUROSEMIDE 80 MG: 10 INJECTION, SOLUTION INTRAMUSCULAR; INTRAVENOUS at 08:49

## 2018-09-30 ENCOUNTER — APPOINTMENT (OUTPATIENT)
Dept: GENERAL RADIOLOGY | Facility: HOSPITAL | Age: 58
End: 2018-09-30

## 2018-09-30 LAB
ANION GAP SERPL CALCULATED.3IONS-SCNC: 11.8 MMOL/L
BASOPHILS # BLD AUTO: 0.01 10*3/MM3 (ref 0–0.2)
BASOPHILS NFR BLD AUTO: 0.1 % (ref 0–1.5)
BUN BLD-MCNC: 27 MG/DL (ref 6–20)
BUN/CREAT SERPL: 17.3 (ref 7–25)
CALCIUM SPEC-SCNC: 8.9 MG/DL (ref 8.6–10.5)
CHLORIDE SERPL-SCNC: 86 MMOL/L (ref 98–107)
CO2 SERPL-SCNC: 27.2 MMOL/L (ref 22–29)
CREAT BLD-MCNC: 1.56 MG/DL (ref 0.76–1.27)
DEPRECATED RDW RBC AUTO: 51.4 FL (ref 37–54)
EOSINOPHIL # BLD AUTO: 0.08 10*3/MM3 (ref 0–0.7)
EOSINOPHIL NFR BLD AUTO: 1 % (ref 0.3–6.2)
ERYTHROCYTE [DISTWIDTH] IN BLOOD BY AUTOMATED COUNT: 16.2 % (ref 11.5–14.5)
GFR SERPL CREATININE-BSD FRML MDRD: 56 ML/MIN/1.73
GLUCOSE BLD-MCNC: 113 MG/DL (ref 65–99)
HCT VFR BLD AUTO: 40.7 % (ref 40.4–52.2)
HGB BLD-MCNC: 14.5 G/DL (ref 13.7–17.6)
IMM GRANULOCYTES # BLD: 0.03 10*3/MM3 (ref 0–0.03)
IMM GRANULOCYTES NFR BLD: 0.4 % (ref 0–0.5)
INR PPP: 1.75 (ref 0.9–1.1)
LYMPHOCYTES # BLD AUTO: 2.09 10*3/MM3 (ref 0.9–4.8)
LYMPHOCYTES NFR BLD AUTO: 25.3 % (ref 19.6–45.3)
MCH RBC QN AUTO: 31.2 PG (ref 27–32.7)
MCHC RBC AUTO-ENTMCNC: 35.6 G/DL (ref 32.6–36.4)
MCV RBC AUTO: 87.5 FL (ref 79.8–96.2)
MONOCYTES # BLD AUTO: 0.71 10*3/MM3 (ref 0.2–1.2)
MONOCYTES NFR BLD AUTO: 8.6 % (ref 5–12)
NEUTROPHILS # BLD AUTO: 5.37 10*3/MM3 (ref 1.9–8.1)
NEUTROPHILS NFR BLD AUTO: 65 % (ref 42.7–76)
PLATELET # BLD AUTO: 220 10*3/MM3 (ref 140–500)
PMV BLD AUTO: 10.3 FL (ref 6–12)
POTASSIUM BLD-SCNC: 3.4 MMOL/L (ref 3.5–5.2)
PROTHROMBIN TIME: 20.1 SECONDS (ref 11.7–14.2)
RBC # BLD AUTO: 4.65 10*6/MM3 (ref 4.6–6)
SODIUM BLD-SCNC: 125 MMOL/L (ref 136–145)
WBC NRBC COR # BLD: 8.26 10*3/MM3 (ref 4.5–10.7)

## 2018-09-30 PROCEDURE — 85610 PROTHROMBIN TIME: CPT | Performed by: HOSPITALIST

## 2018-09-30 PROCEDURE — 80048 BASIC METABOLIC PNL TOTAL CA: CPT | Performed by: HOSPITALIST

## 2018-09-30 PROCEDURE — 99232 SBSQ HOSP IP/OBS MODERATE 35: CPT | Performed by: INTERNAL MEDICINE

## 2018-09-30 PROCEDURE — 25010000002 FUROSEMIDE PER 20 MG: Performed by: INTERNAL MEDICINE

## 2018-09-30 PROCEDURE — 71046 X-RAY EXAM CHEST 2 VIEWS: CPT

## 2018-09-30 PROCEDURE — 94799 UNLISTED PULMONARY SVC/PX: CPT

## 2018-09-30 PROCEDURE — 85025 COMPLETE CBC W/AUTO DIFF WBC: CPT | Performed by: HOSPITALIST

## 2018-09-30 PROCEDURE — 25010000002 ENOXAPARIN PER 10 MG: Performed by: INTERNAL MEDICINE

## 2018-09-30 RX ORDER — WARFARIN SODIUM 7.5 MG/1
7.5 TABLET ORAL
Status: DISCONTINUED | OUTPATIENT
Start: 2018-10-01 | End: 2018-10-02 | Stop reason: HOSPADM

## 2018-09-30 RX ORDER — POTASSIUM CHLORIDE 750 MG/1
40 CAPSULE, EXTENDED RELEASE ORAL EVERY 4 HOURS
Status: COMPLETED | OUTPATIENT
Start: 2018-09-30 | End: 2018-09-30

## 2018-09-30 RX ORDER — FUROSEMIDE 80 MG
80 TABLET ORAL
Status: DISCONTINUED | OUTPATIENT
Start: 2018-10-01 | End: 2018-10-02 | Stop reason: HOSPADM

## 2018-09-30 RX ADMIN — METOPROLOL SUCCINATE 100 MG: 100 TABLET, FILM COATED, EXTENDED RELEASE ORAL at 20:52

## 2018-09-30 RX ADMIN — POTASSIUM CHLORIDE 40 MEQ: 750 CAPSULE, EXTENDED RELEASE ORAL at 22:50

## 2018-09-30 RX ADMIN — WARFARIN SODIUM 5 MG: 5 TABLET ORAL at 18:08

## 2018-09-30 RX ADMIN — FUROSEMIDE 80 MG: 10 INJECTION, SOLUTION INTRAMUSCULAR; INTRAVENOUS at 06:25

## 2018-09-30 RX ADMIN — POTASSIUM CHLORIDE 40 MEQ: 750 CAPSULE, EXTENDED RELEASE ORAL at 16:24

## 2018-09-30 RX ADMIN — IPRATROPIUM BROMIDE AND ALBUTEROL SULFATE 3 ML: .5; 3 SOLUTION RESPIRATORY (INHALATION) at 19:26

## 2018-09-30 RX ADMIN — IPRATROPIUM BROMIDE AND ALBUTEROL SULFATE 3 ML: .5; 3 SOLUTION RESPIRATORY (INHALATION) at 16:34

## 2018-09-30 RX ADMIN — Medication 2 TABLET: at 13:51

## 2018-09-30 RX ADMIN — METOPROLOL SUCCINATE 100 MG: 100 TABLET, FILM COATED, EXTENDED RELEASE ORAL at 09:55

## 2018-09-30 RX ADMIN — ENOXAPARIN SODIUM 90 MG: 100 INJECTION SUBCUTANEOUS at 20:54

## 2018-09-30 RX ADMIN — ATORVASTATIN CALCIUM 10 MG: 10 TABLET, FILM COATED ORAL at 20:52

## 2018-09-30 RX ADMIN — IPRATROPIUM BROMIDE AND ALBUTEROL SULFATE 3 ML: .5; 3 SOLUTION RESPIRATORY (INHALATION) at 12:52

## 2018-09-30 RX ADMIN — FUROSEMIDE 80 MG: 10 INJECTION, SOLUTION INTRAMUSCULAR; INTRAVENOUS at 13:51

## 2018-09-30 RX ADMIN — FUROSEMIDE 80 MG: 10 INJECTION, SOLUTION INTRAMUSCULAR; INTRAVENOUS at 21:13

## 2018-10-01 ENCOUNTER — APPOINTMENT (OUTPATIENT)
Dept: CARDIOLOGY | Facility: HOSPITAL | Age: 58
End: 2018-10-01
Attending: INTERNAL MEDICINE

## 2018-10-01 PROBLEM — N18.30 CHRONIC KIDNEY DISEASE, STAGE 3 (HCC): Status: ACTIVE | Noted: 2018-10-01

## 2018-10-01 PROBLEM — I50.43 ACUTE ON CHRONIC COMBINED SYSTOLIC AND DIASTOLIC CONGESTIVE HEART FAILURE (HCC): Status: ACTIVE | Noted: 2018-03-26

## 2018-10-01 PROBLEM — N17.9 AKI (ACUTE KIDNEY INJURY) (HCC): Status: ACTIVE | Noted: 2018-10-01

## 2018-10-01 LAB
ANION GAP SERPL CALCULATED.3IONS-SCNC: 13.3 MMOL/L
BASOPHILS # BLD AUTO: 0.02 10*3/MM3 (ref 0–0.2)
BASOPHILS NFR BLD AUTO: 0.3 % (ref 0–1.5)
BH CV ECHO MEAS - BSA(HAYCOCK): 2 M^2
BH CV ECHO MEAS - BSA: 2.1 M^2
BH CV ECHO MEAS - BZI_BMI: 20.3 KILOGRAMS/M^2
BH CV ECHO MEAS - BZI_METRIC_HEIGHT: 195.6 CM
BH CV ECHO MEAS - BZI_METRIC_WEIGHT: 77.6 KG
BUN BLD-MCNC: 26 MG/DL (ref 6–20)
BUN/CREAT SERPL: 16.9 (ref 7–25)
CALCIUM SPEC-SCNC: 9 MG/DL (ref 8.6–10.5)
CHLORIDE SERPL-SCNC: 92 MMOL/L (ref 98–107)
CO2 SERPL-SCNC: 30.7 MMOL/L (ref 22–29)
CREAT BLD-MCNC: 1.54 MG/DL (ref 0.76–1.27)
DEPRECATED RDW RBC AUTO: 52.8 FL (ref 37–54)
EOSINOPHIL # BLD AUTO: 0.15 10*3/MM3 (ref 0–0.7)
EOSINOPHIL NFR BLD AUTO: 2.1 % (ref 0.3–6.2)
ERYTHROCYTE [DISTWIDTH] IN BLOOD BY AUTOMATED COUNT: 16.2 % (ref 11.5–14.5)
GFR SERPL CREATININE-BSD FRML MDRD: 57 ML/MIN/1.73
GLUCOSE BLD-MCNC: 99 MG/DL (ref 65–99)
HCT VFR BLD AUTO: 42 % (ref 40.4–52.2)
HGB BLD-MCNC: 14.2 G/DL (ref 13.7–17.6)
IMM GRANULOCYTES # BLD: 0.03 10*3/MM3 (ref 0–0.03)
IMM GRANULOCYTES NFR BLD: 0.4 % (ref 0–0.5)
INR PPP: 1.8 (ref 0.9–1.1)
LYMPHOCYTES # BLD AUTO: 2.08 10*3/MM3 (ref 0.9–4.8)
LYMPHOCYTES NFR BLD AUTO: 29.1 % (ref 19.6–45.3)
MCH RBC QN AUTO: 30.5 PG (ref 27–32.7)
MCHC RBC AUTO-ENTMCNC: 33.8 G/DL (ref 32.6–36.4)
MCV RBC AUTO: 90.3 FL (ref 79.8–96.2)
MONOCYTES # BLD AUTO: 0.71 10*3/MM3 (ref 0.2–1.2)
MONOCYTES NFR BLD AUTO: 9.9 % (ref 5–12)
NEUTROPHILS # BLD AUTO: 4.15 10*3/MM3 (ref 1.9–8.1)
NEUTROPHILS NFR BLD AUTO: 58.2 % (ref 42.7–76)
PLATELET # BLD AUTO: 227 10*3/MM3 (ref 140–500)
PMV BLD AUTO: 10.4 FL (ref 6–12)
POTASSIUM BLD-SCNC: 3.7 MMOL/L (ref 3.5–5.2)
PROTHROMBIN TIME: 20.6 SECONDS (ref 11.7–14.2)
RBC # BLD AUTO: 4.65 10*6/MM3 (ref 4.6–6)
SODIUM BLD-SCNC: 136 MMOL/L (ref 136–145)
WBC NRBC COR # BLD: 7.14 10*3/MM3 (ref 4.5–10.7)

## 2018-10-01 PROCEDURE — 25010000002 MIDAZOLAM PER 1 MG: Performed by: INTERNAL MEDICINE

## 2018-10-01 PROCEDURE — 92960 CARDIOVERSION ELECTRIC EXT: CPT | Performed by: INTERNAL MEDICINE

## 2018-10-01 PROCEDURE — 93312 ECHO TRANSESOPHAGEAL: CPT | Performed by: INTERNAL MEDICINE

## 2018-10-01 PROCEDURE — 93320 DOPPLER ECHO COMPLETE: CPT

## 2018-10-01 PROCEDURE — 93312 ECHO TRANSESOPHAGEAL: CPT

## 2018-10-01 PROCEDURE — 94799 UNLISTED PULMONARY SVC/PX: CPT

## 2018-10-01 PROCEDURE — 80048 BASIC METABOLIC PNL TOTAL CA: CPT | Performed by: HOSPITALIST

## 2018-10-01 PROCEDURE — 99153 MOD SED SAME PHYS/QHP EA: CPT

## 2018-10-01 PROCEDURE — 92960 CARDIOVERSION ELECTRIC EXT: CPT

## 2018-10-01 PROCEDURE — 99232 SBSQ HOSP IP/OBS MODERATE 35: CPT | Performed by: INTERNAL MEDICINE

## 2018-10-01 PROCEDURE — 85025 COMPLETE CBC W/AUTO DIFF WBC: CPT | Performed by: HOSPITALIST

## 2018-10-01 PROCEDURE — 93320 DOPPLER ECHO COMPLETE: CPT | Performed by: INTERNAL MEDICINE

## 2018-10-01 PROCEDURE — 99152 MOD SED SAME PHYS/QHP 5/>YRS: CPT

## 2018-10-01 PROCEDURE — 25010000002 FENTANYL CITRATE (PF) 100 MCG/2ML SOLUTION: Performed by: INTERNAL MEDICINE

## 2018-10-01 PROCEDURE — 85610 PROTHROMBIN TIME: CPT | Performed by: HOSPITALIST

## 2018-10-01 PROCEDURE — 93325 DOPPLER ECHO COLOR FLOW MAPG: CPT

## 2018-10-01 PROCEDURE — 25010000002 ENOXAPARIN PER 10 MG: Performed by: INTERNAL MEDICINE

## 2018-10-01 PROCEDURE — 93325 DOPPLER ECHO COLOR FLOW MAPG: CPT | Performed by: INTERNAL MEDICINE

## 2018-10-01 RX ORDER — SODIUM CHLORIDE 9 MG/ML
INJECTION, SOLUTION INTRAVENOUS
Status: COMPLETED | OUTPATIENT
Start: 2018-10-01 | End: 2018-10-01

## 2018-10-01 RX ORDER — MIDAZOLAM HYDROCHLORIDE 1 MG/ML
INJECTION INTRAMUSCULAR; INTRAVENOUS
Status: COMPLETED | OUTPATIENT
Start: 2018-10-01 | End: 2018-10-01

## 2018-10-01 RX ORDER — FENTANYL CITRATE 50 UG/ML
INJECTION, SOLUTION INTRAMUSCULAR; INTRAVENOUS
Status: COMPLETED | OUTPATIENT
Start: 2018-10-01 | End: 2018-10-01

## 2018-10-01 RX ORDER — POTASSIUM CHLORIDE 750 MG/1
40 CAPSULE, EXTENDED RELEASE ORAL ONCE
Status: COMPLETED | OUTPATIENT
Start: 2018-10-01 | End: 2018-10-01

## 2018-10-01 RX ADMIN — MIDAZOLAM 2 MG: 1 INJECTION INTRAMUSCULAR; INTRAVENOUS at 13:41

## 2018-10-01 RX ADMIN — ENOXAPARIN SODIUM 90 MG: 100 INJECTION SUBCUTANEOUS at 19:54

## 2018-10-01 RX ADMIN — IPRATROPIUM BROMIDE AND ALBUTEROL SULFATE 3 ML: .5; 3 SOLUTION RESPIRATORY (INHALATION) at 15:58

## 2018-10-01 RX ADMIN — IPRATROPIUM BROMIDE AND ALBUTEROL SULFATE 3 ML: .5; 3 SOLUTION RESPIRATORY (INHALATION) at 21:55

## 2018-10-01 RX ADMIN — BENZOCAINE, BUTAMBEN, AND TETRACAINE HYDROCHLORIDE 1 SPRAY: .028; .004; .004 AEROSOL, SPRAY TOPICAL at 13:30

## 2018-10-01 RX ADMIN — IPRATROPIUM BROMIDE AND ALBUTEROL SULFATE 3 ML: .5; 3 SOLUTION RESPIRATORY (INHALATION) at 12:19

## 2018-10-01 RX ADMIN — WARFARIN SODIUM 7.5 MG: 7.5 TABLET ORAL at 17:56

## 2018-10-01 RX ADMIN — FENTANYL CITRATE 25 MCG: 50 INJECTION INTRAMUSCULAR; INTRAVENOUS at 13:46

## 2018-10-01 RX ADMIN — MIDAZOLAM 1 MG: 1 INJECTION INTRAMUSCULAR; INTRAVENOUS at 13:46

## 2018-10-01 RX ADMIN — FENTANYL CITRATE 25 MCG: 50 INJECTION INTRAMUSCULAR; INTRAVENOUS at 13:44

## 2018-10-01 RX ADMIN — SODIUM CHLORIDE 50 ML/HR: 9 INJECTION, SOLUTION INTRAVENOUS at 13:25

## 2018-10-01 RX ADMIN — METHOHEXITAL SODIUM 20 MG: 500 INJECTION, POWDER, LYOPHILIZED, FOR SOLUTION INTRAMUSCULAR; INTRAVENOUS; RECTAL at 13:55

## 2018-10-01 RX ADMIN — IPRATROPIUM BROMIDE AND ALBUTEROL SULFATE 3 ML: .5; 3 SOLUTION RESPIRATORY (INHALATION) at 08:31

## 2018-10-01 RX ADMIN — ATORVASTATIN CALCIUM 10 MG: 10 TABLET, FILM COATED ORAL at 21:15

## 2018-10-01 RX ADMIN — POTASSIUM CHLORIDE 40 MEQ: 750 CAPSULE, EXTENDED RELEASE ORAL at 21:15

## 2018-10-01 RX ADMIN — FENTANYL CITRATE 50 MCG: 50 INJECTION INTRAMUSCULAR; INTRAVENOUS at 13:41

## 2018-10-01 RX ADMIN — MIDAZOLAM 1 MG: 1 INJECTION INTRAMUSCULAR; INTRAVENOUS at 13:44

## 2018-10-01 RX ADMIN — FUROSEMIDE 80 MG: 80 TABLET ORAL at 17:56

## 2018-10-01 RX ADMIN — LIDOCAINE HYDROCHLORIDE 10 ML: 20 SOLUTION ORAL; TOPICAL at 13:25

## 2018-10-01 RX ADMIN — METOPROLOL SUCCINATE 100 MG: 100 TABLET, FILM COATED, EXTENDED RELEASE ORAL at 09:37

## 2018-10-01 RX ADMIN — METOPROLOL SUCCINATE 100 MG: 100 TABLET, FILM COATED, EXTENDED RELEASE ORAL at 21:15

## 2018-10-01 RX ADMIN — FUROSEMIDE 80 MG: 80 TABLET ORAL at 09:37

## 2018-10-01 NOTE — PROGRESS NOTES
Name: Hernando Caro ADMIT: 2018   : 1960  PCP: Jerald Sebastian MD    MRN: [id number] LOS: 3 days   AGE/SEX: 58 y.o. male  ROOM: 64/   Subjective   CC: dyspnea  No acute events. Dyspnea is much improved.  Has been NPO for SHYLA.  Denies f/c/n/v/d.    Objective   Vital Signs  Temp:  [97.8 °F (36.6 °C)-98.2 °F (36.8 °C)] 98 °F (36.7 °C)  Heart Rate:  [] 75  Resp:  [14-20] 14  BP: ()/(62-94) 97/72  SpO2:  [94 %-100 %] 97 %  on   ;   Device (Oxygen Therapy): room air  Body mass index is 20.28 kg/m².    Physical Exam   Constitutional: No distress.   HENT:   Head: Normocephalic and atraumatic.   Mouth/Throat: Oropharynx is clear and moist.   Eyes: Pupils are equal, round, and reactive to light. Conjunctivae and EOM are normal.   Neck: Normal range of motion. Neck supple.   Cardiovascular: Normal rate and intact distal pulses.  An irregularly irregular rhythm present.   Pulmonary/Chest: Effort normal. He has decreased breath sounds in the right lower field and the left lower field.   Abdominal: Soft. Bowel sounds are normal. There is no tenderness.   Musculoskeletal: He exhibits edema (1+ BLE edema). He exhibits no tenderness.   Neurological: He is alert.   Skin: Skin is warm and dry. He is not diaphoretic.   Psychiatric: He has a normal mood and affect. His behavior is normal.   Nursing note and vitals reviewed.      Results Review:       I reviewed the patient's new clinical results.    Results from last 7 days  Lab Units 10/01/18  0627 09/30/18  0703 18  0531 18  0117   WBC 10*3/mm3 7.14 8.26 8.95 7.46   HEMOGLOBIN g/dL 14.2 14.5 15.6 15.3   PLATELETS 10*3/mm3 227 220 247 191       Results from last 7 days  Lab Units 10/01/18  0627 09/30/18  0703 18  0531 18  2300   SODIUM mmol/L 136 125* 122* 121*   POTASSIUM mmol/L 3.7 3.4* 4.5 4.8   CHLORIDE mmol/L 92* 86* 85* 80*   CO2 mmol/L 30.7* 27.2 26.0 27.7   BUN mg/dL 26* 27* 33* 32*   CREATININE mg/dL 1.54* 1.56* 1.83*  2.03*   GLUCOSE mg/dL 99 113* 100* 113*   Estimated Creatinine Clearance: 57.4 mL/min (A) (by C-G formula based on SCr of 1.54 mg/dL (H)).  Results from last 7 days  Lab Units 10/01/18  0627 09/30/18  0703 09/29/18  0531 09/28/18  2300  09/28/18  0117   CALCIUM mg/dL 9.0 8.9 9.1 8.8  < > 9.3   ALBUMIN g/dL  --   --  3.20*  --   --  3.90   MAGNESIUM mg/dL  --   --  2.1  --   --   --    PHOSPHORUS mg/dL  --   --  4.2  --   --   --    < > = values in this interval not displayed.      atorvastatin 10 mg Oral Nightly   enoxaparin 1 mg/kg Subcutaneous Q12H   furosemide 80 mg Oral BID   ipratropium-albuterol 3 mL Nebulization 4x Daily - RT   metoprolol succinate  mg Oral Q12H   warfarin 7.5 mg Oral Daily       hold 1 each   Diet Regular; Low Sodium, Low Potassium, Daily Fluid Restriction; 1500 mL Fluid; 2 gm (50 mEq)      Assessment/Plan      Active Hospital Problems    Diagnosis Date Noted   • **Persistent atrial fibrillation (CMS/[facility]) [I48.1] 05/11/2018   • Chronic kidney disease, stage 3 (CMS/[facility]) [N18.3] 10/01/2018   • ARIEL (acute kidney injury) (CMS/[facility]) [N17.9] 10/01/2018   • Pleural effusion on right [J90] 09/28/2018   • Hyponatremia [E87.1] 04/13/2018   • Acute on chronic combined systolic and diastolic congestive heart failure (CMS/[facility]) [I50.43] 03/26/2018   • Essential hypertension [I10]       Resolved Hospital Problems    Diagnosis Date Noted Date Resolved   No resolved problems to display.   Acute on Chronic Combined CHF  - continue diuresis, IV lasix switched to PO  - monitor volume status, renal function, and electrolytes  - BP on the low end but okay  - appreciate cardiology and nephrology recommendations    Persistent Atrial Fibrillation  - rate control has been an issue  - SHYLA with cardioversion today per cardiology    CKD 3/ARIEL  - Cr improving with diuresis, continue to monitor  - appreciate nephrology recs    Right Pleural Effusion  - s/p thoracentesis 9/28/18 with 2500cc out  - transudate by  Light's criteria  - likely due to CHF  - appreciate pulmonology recs    COPD  - does not appear to be in acute exacerbation  - continue bronchodilators per pulm    DVT Prophylaxis  - on AC with lovenox (for Afib)    Disposition  Home in a few days     Thanks to consultants.    Shane Carr MD  [facility]  10/01/18  3:38 PM

## 2018-10-01 NOTE — PLAN OF CARE
Problem: Patient Care Overview  Goal: Plan of Care Review  Outcome: Ongoing (interventions implemented as appropriate)   10/01/18 0435   Coping/Psychosocial   Plan of Care Reviewed With patient   Plan of Care Review   Progress improving   OTHER   Outcome Summary Pt A&O. Pt does not c/o pain, SOB or distress. IV lasix given at 0800 along with other nightly medications. NPO since 0000, possible SHYLA later today. Has been running tachycardic on monitor. Other VSS, will continue to monitor pt.      Goal: Individualization and Mutuality  Outcome: Ongoing (interventions implemented as appropriate)    Goal: Discharge Needs Assessment  Outcome: Ongoing (interventions implemented as appropriate)    Goal: Interprofessional Rounds/Family Conf  Outcome: Ongoing (interventions implemented as appropriate)      Problem: Cardiac: Heart Failure (Adult)  Goal: Signs and Symptoms of Listed Potential Problems Will be Absent, Minimized or Managed (Cardiac: Heart Failure)  Outcome: Ongoing (interventions implemented as appropriate)      Problem: Breathing Pattern Ineffective (Adult)  Goal: Identify Related Risk Factors and Signs and Symptoms  Outcome: Ongoing (interventions implemented as appropriate)    Goal: Effective Oxygenation/Ventilation  Outcome: Ongoing (interventions implemented as appropriate)    Goal: Anxiety/Fear Reduction  Outcome: Ongoing (interventions implemented as appropriate)

## 2018-10-01 NOTE — PROGRESS NOTES
NEPHROLOGY PROGRESS NOTE    PATIENT IDENTIFICATION:   Name:  Hernando Caro      MRN:  [id number]     58 y.o.  male             Reason for visit: ARIEL vs ARIEL/CKD3    SUBJECTIVE:  Breathing is fine; s/p SHYLA and CV earlier today; remains in NSR; appetite mediocre    OBJECTIVE:  Vitals:    10/01/18 1558 10/01/18 1606 10/01/18 1756 10/01/18 1949   BP:   102/71 99/74   BP Location:   Right arm Left arm   Patient Position:   Sitting Lying   Pulse: 80 80 84 81   Resp: 16 16  16   Temp:       TempSrc:       SpO2: 99%  99% 97%   Weight:       Height:               Body mass index is 20.28 kg/m².    Intake/Output Summary (Last 24 hours) at 10/01/18 1955  Last data filed at 10/01/18 1949   Gross per 24 hour   Intake              840 ml   Output             2475 ml   Net            -1635 ml     Wt Readings from Last 1 Encounters:   10/01/18 1419 77.6 kg (171 lb)   10/01/18 0558 79.8 kg (176 lb)   09/30/18 0500 85.7 kg (189 lb)   09/29/18 0508 89.7 kg (197 lb 12.8 oz)   09/28/18 0521 91.2 kg (201 lb 2 oz)   09/28/18 0119 92.5 kg (204 lb)     Wt Readings from Last 3 Encounters:   10/01/18 77.6 kg (171 lb)   09/25/18 90.3 kg (199 lb)   09/17/18 90.7 kg (200 lb)         Exam:  NAD; pleasant; oriented; looks older than stated age  Chr ill; anxious  MMM; AT/NC  No eye d/c; no scleral icterus  +JVD; no carotid bruits  Rales bilat; not labored on RA  RRR, no rub, +M  Soft, NT, +D, BS+  +1 edema  +clubbing  No asterixis  Anxious mood; odd affect  Moves all extremities     Scheduled meds:      atorvastatin 10 mg Oral Nightly   enoxaparin 1 mg/kg Subcutaneous Q12H   furosemide 80 mg Oral BID   ipratropium-albuterol 3 mL Nebulization 4x Daily - RT   metoprolol succinate  mg Oral Q12H   warfarin 7.5 mg Oral Daily     IV meds:                          hold 1 each       Data Review:      Results from last 7 days  Lab Units 10/01/18  0627 09/30/18  0703 09/29/18  0531  09/28/18  0117   SODIUM mmol/L 136 125* 122*  < > 124*   POTASSIUM  mmol/L 3.7 3.4* 4.5  < > 4.9   CHLORIDE mmol/L 92* 86* 85*  < > 83*   CO2 mmol/L 30.7* 27.2 26.0  < > 26.3   BUN mg/dL 26* 27* 33*  < > 22*   CREATININE mg/dL 1.54* 1.56* 1.83*  < > 1.61*   CALCIUM mg/dL 9.0 8.9 9.1  < > 9.3   BILIRUBIN mg/dL  --   --   --   --  2.9*   ALK PHOS U/L  --   --   --   --  273*   ALT (SGPT) U/L  --   --   --   --  30   AST (SGOT) U/L  --   --   --   --  46*   GLUCOSE mg/dL 99 113* 100*  < > 108*   < > = values in this interval not displayed.  Estimated Creatinine Clearance: 57.4 mL/min (A) (by C-G formula based on SCr of 1.54 mg/dL (H)).    Results from last 7 days  Lab Units 09/28/18  2212   SODIUM UR mmol/L <20   CREATININE UR mg/dL 124.5   PROTEIN TOTAL URINE mg/dL 38.0       Results from last 7 days  Lab Units 09/29/18  0531   MAGNESIUM mg/dL 2.1   PHOSPHORUS mg/dL 4.2         Results from last 7 days  Lab Units 10/01/18  0627 09/30/18  0703 09/29/18  0531 09/28/18  0117   WBC 10*3/mm3 7.14 8.26 8.95 7.46   HEMOGLOBIN g/dL 14.2 14.5 15.6 15.3   PLATELETS 10*3/mm3 227 220 247 191         Results from last 7 days  Lab Units 10/01/18  0627 09/30/18  0703 09/29/18  0531 09/28/18  1429 09/28/18  0117   INR  1.80* 1.75* 1.90* 1.82* 1.66*             ASSESSMENT:   Principal Problem:    Persistent atrial fibrillation (CMS/HCC)  Active Problems:    Essential hypertension    Acute on chronic combined systolic and diastolic congestive heart failure (CMS/HCC)    Hyponatremia    Pleural effusion on right    Chronic kidney disease, stage 3 (CMS/HCC)    ARIEL (acute kidney injury) (CMS/HCC)    1.  ARIEL vs ARIEL/CKD3, non-oliguric, improving.  He has had other abnl SCr's earlier this year.  ARIEL is prerenal from CHF and low cardiac output. FENa less than 1%; bland urine with few cells only and very modest proteinuria.   Low Na from hypervolemia; K low-normal.  No hydro by imaging  2.  CHF  3.  Atrial fib/flutter: in NSR after CV today  4.  Daily EtOH: likely aggravating his arrhythmia and CM.  AST is  elevated  5. COPD: will also contribute to water retention and hypoNa      PLAN:  1.  Lasix 80 mg PO BID  2.  Extra oral KCl today  3.  Surveillance labs      John Law MD  10/1/2018    7:55 PM

## 2018-10-01 NOTE — PLAN OF CARE
Problem: Patient Care Overview  Goal: Plan of Care Review  Outcome: Ongoing (interventions implemented as appropriate)   10/01/18 7280   Coping/Psychosocial   Plan of Care Reviewed With patient   OTHER   Outcome Summary pt denies pain or sob, vss, given po lasix, had shandra and cardioversion, is in sr, will ctm.     Goal: Individualization and Mutuality  Outcome: Ongoing (interventions implemented as appropriate)    Goal: Discharge Needs Assessment  Outcome: Ongoing (interventions implemented as appropriate)    Goal: Interprofessional Rounds/Family Conf  Outcome: Ongoing (interventions implemented as appropriate)      Problem: Cardiac: Heart Failure (Adult)  Goal: Signs and Symptoms of Listed Potential Problems Will be Absent, Minimized or Managed (Cardiac: Heart Failure)  Outcome: Ongoing (interventions implemented as appropriate)      Problem: Breathing Pattern Ineffective (Adult)  Goal: Identify Related Risk Factors and Signs and Symptoms  Outcome: Ongoing (interventions implemented as appropriate)    Goal: Effective Oxygenation/Ventilation  Outcome: Ongoing (interventions implemented as appropriate)    Goal: Anxiety/Fear Reduction  Outcome: Ongoing (interventions implemented as appropriate)

## 2018-10-01 NOTE — PROGRESS NOTES
LOS: 3 days   Patient Care Team:  Jerald Sebastian MD as PCP - General (Internal Medicine)  Jerald Sebastian MD as PCP - Family Medicine  Jerald Sebastian MD Haraden, Brennan M, MD as Consulting Physician (Cardiology)    Chief Complaint: Follow-up for persistent atrial fibrillation, acute on chronic combined CHF, cardiomyopathy     Interval History: Better - less SOA.  HR currently in upper 90's with rest.  No CP.    Vital Signs:  Temp:  [97.6 °F (36.4 °C)-98.8 °F (37.1 °C)] 98.2 °F (36.8 °C)  Heart Rate:  [] 88  Resp:  [16-20] 20  BP: (107-116)/(79-92) 107/82    Intake/Output Summary (Last 24 hours) at 10/01/18 0848  Last data filed at 10/01/18 0037   Gross per 24 hour   Intake             1020 ml   Output             5350 ml   Net            -4330 ml       Physical Exam:   General Appearance:    No acute distress, alert and oriented x4   Lungs:     Rales at the left base      Heart:    The irregular rhythm with a normal rate.  II/VI SM LLSB   Abdomen:     Soft, non-tender, non-distended.    Extremities:   1+ edema.     Results Review:      Results from last 7 days  Lab Units 10/01/18  0627   SODIUM mmol/L 136   POTASSIUM mmol/L 3.7   CHLORIDE mmol/L 92*   CO2 mmol/L 30.7*   BUN mg/dL 26*   CREATININE mg/dL 1.54*   GLUCOSE mg/dL 99   CALCIUM mg/dL 9.0       Results from last 7 days  Lab Units 09/28/18  1429 09/28/18  0117   TROPONIN T ng/mL 0.017 0.028       Results from last 7 days  Lab Units 10/01/18  0627   WBC 10*3/mm3 7.14   HEMOGLOBIN g/dL 14.2   HEMATOCRIT % 42.0   PLATELETS 10*3/mm3 227       Results from last 7 days  Lab Units 10/01/18  0627 09/30/18  0703 09/29/18  0531  09/28/18  0117   INR  1.80* 1.75* 1.90*  < > 1.66*   APTT seconds  --   --   --   --  37.8*   < > = values in this interval not displayed.        Results from last 7 days  Lab Units 09/29/18  0531   MAGNESIUM mg/dL 2.1           I reviewed the patient's new clinical results.        Assessment:  1. Persistent atrial fibrillation  with RVR  2. Non-ischemic cardiomyopathy   3. Acute on chronic combined CHF  4. Status post large-volume right thoracentesis on 9/20/2018 (2500 cc removed)  5. Acute on chronic kidney injury  6. Hyponatremia - improved   7. COPD  8. Daily alcohol use     Plan:  -I have reviewed the notes from Dr. Davsi regarding cardioversion.  I also directly reviewed his echocardiogram images.  His left atrium is dilated with a left atrial volume of 96.  He also drinks alcohol on a daily basis.  Therefore, I feel that the chances of him staying successfully in sinus rhythm are not optimal.  However, there are few other options as his blood pressure will not tolerate further medications, and his digoxin was stopped on admission because of his kidney issues.  Plan for SHYLA with direct-current cardioversion today, followed by amiodarone loading intravenously, and then transitioned to oral.    -Continue Lovenox and Coumadin - INR 1.8    -Converted to oral Lasix 80 mg bid per Nephrology.  He has been diuresed extensively.  Renal function is better.  Sodium is better on fluid restriction.    -Echo reviewed - EF was likely closer to 20-25%.  Advised him that alcohol is likely also contributing to recent decline.     -Continue Toprol XL.  Allergic to ACEI's.  No ARB secondary to renal function and low BP.    Brett Still MD  10/01/18  8:48 AM

## 2018-10-01 NOTE — PROGRESS NOTES
[facility] INPATIENT PROGRESS NOTE         [address]    10/1/2018      PATIENT IDENTIFICATION:  Name: Hernando Caro ADMIT: 2018   : 1960  PCP: Jerald Sebastian MD    MRN: [id number] LOS: 3 days   AGE/SEX: 58 y.o. male  ROOM: [facility]                     LOS 3    Reason for visit: Follow-up large right effusion and COPD      SUBJECTIVE:   Resting comfortably. Denies cough or chest pain.     Objective   OBJECTIVE:    Vital Sign Min/Max for last 24 hours  Temp  Min: 97.8 °F (36.6 °C)  Max: 98.8 °F (37.1 °C)   BP  Min: 95/67  Max: 115/83   Pulse  Min: 81  Max: 108   Resp  Min: 16  Max: 20   SpO2  Min: 94 %  Max: 99 %   No Data Recorded   Weight  Min: 79.8 kg (176 lb)  Max: 79.8 kg (176 lb)                         Body mass index is 20.87 kg/m².    Intake/Output Summary (Last 24 hours) at 10/01/18 1139  Last data filed at 10/01/18 0037   Gross per 24 hour   Intake              780 ml   Output             3400 ml   Net            -2620 ml         Exam:  GEN:  No distress, appears stated age  EYES:   PERRL, anicteric sclera  ENT:    External ears/nose normal, OP clear  NECK:  No adenopathy, midline trachea  LUNGS: Normal chest on inspection, palpation and diminished breath sounds on auscultation  CV:  Normal S1S2, without murmur  ABD:  Non tender, non distended, no hepatosplenomegaly, +BS  EXT:  No cyanosis or clubbing    Scheduled meds:      atorvastatin 10 mg Oral Nightly   enoxaparin 1 mg/kg Subcutaneous Q12H   furosemide 80 mg Oral BID   ipratropium-albuterol 3 mL Nebulization 4x Daily - RT   metoprolol succinate  mg Oral Q12H   warfarin 7.5 mg Oral Daily     IV meds:                          hold 1 each     Data Review:    Results from last 7 days  Lab Units 10/01/18  0627 18  0703 18  0531 18  2300 18  1429   SODIUM mmol/L 136 125* 122* 121* 123*   POTASSIUM mmol/L 3.7 3.4* 4.5 4.8 5.5*   CHLORIDE mmol/L 92* 86* 85* 80* 81*   CO2 mmol/L 30.7* 27.2 26.0  27.7 19.1*   BUN mg/dL 26* 27* 33* 32* 29*   CREATININE mg/dL 1.54* 1.56* 1.83* 2.03* 1.94*   GLUCOSE mg/dL 99 113* 100* 113* 97   CALCIUM mg/dL 9.0 8.9 9.1 8.8 9.7         Estimated Creatinine Clearance: 59 mL/min (A) (by C-G formula based on SCr of 1.54 mg/dL (H)).    Results from last 7 days  Lab Units 10/01/18  0627 09/30/18  0703 09/29/18  0531 09/28/18  0117   WBC 10*3/mm3 7.14 8.26 8.95 7.46   HEMOGLOBIN g/dL 14.2 14.5 15.6 15.3   PLATELETS 10*3/mm3 227 220 247 191       Results from last 7 days  Lab Units 10/01/18  0627 09/30/18  0703 09/29/18  0531 09/28/18  1429 09/28/18  0117   INR  1.80* 1.75* 1.90* 1.82* 1.66*       Results from last 7 days  Lab Units 09/28/18  0117   ALT (SGPT) U/L 30   AST (SGOT) U/L 46*                 Microbiology reviewed    CXR 9/30 viewed shows partial improvement        )Assessment   ASSESSMENT:      Active Hospital Problems    Diagnosis Date Noted   • **Persistent atrial fibrillation (CMS/HCC) [I48.1] 05/11/2018   • Pleural effusion on right [J90] 09/28/2018   • Hyponatremia [E87.1] 04/13/2018   • Combined systolic and diastolic congestive heart failure (CMS/HCC) [I50.40] 03/26/2018   • Essential hypertension [I10]       Resolved Hospital Problems    Diagnosis Date Noted Date Resolved   No resolved problems to display.     Large right pleural effusion: Status post large volume thoracentesis  COPD  Acute on chronic systolic CHF  Chronic atrial fibrillation  Hyponatremia  Acute kidney injury on chronic kidney disease      PLAN:  Status post large-volume thoracentesis with improvement in dyspnea.  Encourage pulmonary toilet.  Bronchodilators for COPD symptoms.  Pleural effusion appears to be transudate.  Receiving diuretics for CHF.  Repeat chest x-ray for follow-up shows improvement.    Mansoor Noel MD  Pulmonary and Critical Care Medicine  [facility], [facility]  10/1/2018    11:39 AM

## 2018-10-02 VITALS
DIASTOLIC BLOOD PRESSURE: 85 MMHG | SYSTOLIC BLOOD PRESSURE: 109 MMHG | HEIGHT: 77 IN | TEMPERATURE: 98.3 F | HEART RATE: 82 BPM | OXYGEN SATURATION: 100 % | RESPIRATION RATE: 18 BRPM | WEIGHT: 179 LBS | BODY MASS INDEX: 21.13 KG/M2

## 2018-10-02 LAB
ALBUMIN SERPL-MCNC: 3.3 G/DL (ref 3.5–5.2)
ANION GAP SERPL CALCULATED.3IONS-SCNC: 11.4 MMOL/L
BASOPHILS # BLD AUTO: 0.05 10*3/MM3 (ref 0–0.2)
BASOPHILS NFR BLD AUTO: 0.8 % (ref 0–1.5)
BUN BLD-MCNC: 24 MG/DL (ref 6–20)
BUN/CREAT SERPL: 15.6 (ref 7–25)
CALCIUM SPEC-SCNC: 9 MG/DL (ref 8.6–10.5)
CHLORIDE SERPL-SCNC: 91 MMOL/L (ref 98–107)
CO2 SERPL-SCNC: 30.6 MMOL/L (ref 22–29)
CREAT BLD-MCNC: 1.54 MG/DL (ref 0.76–1.27)
DEPRECATED RDW RBC AUTO: 54.2 FL (ref 37–54)
EOSINOPHIL # BLD AUTO: 0.2 10*3/MM3 (ref 0–0.7)
EOSINOPHIL NFR BLD AUTO: 3.4 % (ref 0.3–6.2)
ERYTHROCYTE [DISTWIDTH] IN BLOOD BY AUTOMATED COUNT: 16.5 % (ref 11.5–14.5)
GFR SERPL CREATININE-BSD FRML MDRD: 57 ML/MIN/1.73
GLUCOSE BLD-MCNC: 103 MG/DL (ref 65–99)
HCT VFR BLD AUTO: 40.7 % (ref 40.4–52.2)
HGB BLD-MCNC: 13.4 G/DL (ref 13.7–17.6)
IMM GRANULOCYTES # BLD: 0 10*3/MM3 (ref 0–0.03)
IMM GRANULOCYTES NFR BLD: 0 % (ref 0–0.5)
INR PPP: 2.16 (ref 0.9–1.1)
LYMPHOCYTES # BLD AUTO: 1.88 10*3/MM3 (ref 0.9–4.8)
LYMPHOCYTES NFR BLD AUTO: 31.9 % (ref 19.6–45.3)
MCH RBC QN AUTO: 30 PG (ref 27–32.7)
MCHC RBC AUTO-ENTMCNC: 32.9 G/DL (ref 32.6–36.4)
MCV RBC AUTO: 91.1 FL (ref 79.8–96.2)
MONOCYTES # BLD AUTO: 0.67 10*3/MM3 (ref 0.2–1.2)
MONOCYTES NFR BLD AUTO: 11.4 % (ref 5–12)
NEUTROPHILS # BLD AUTO: 3.1 10*3/MM3 (ref 1.9–8.1)
NEUTROPHILS NFR BLD AUTO: 52.5 % (ref 42.7–76)
PHOSPHATE SERPL-MCNC: 3.6 MG/DL (ref 2.5–4.5)
PLATELET # BLD AUTO: 227 10*3/MM3 (ref 140–500)
PMV BLD AUTO: 10.3 FL (ref 6–12)
POTASSIUM BLD-SCNC: 4 MMOL/L (ref 3.5–5.2)
PROTHROMBIN TIME: 23.7 SECONDS (ref 11.7–14.2)
RBC # BLD AUTO: 4.47 10*6/MM3 (ref 4.6–6)
REF LAB TEST METHOD: NORMAL
SODIUM BLD-SCNC: 133 MMOL/L (ref 136–145)
WBC NRBC COR # BLD: 5.9 10*3/MM3 (ref 4.5–10.7)

## 2018-10-02 PROCEDURE — 85610 PROTHROMBIN TIME: CPT | Performed by: HOSPITALIST

## 2018-10-02 PROCEDURE — 94799 UNLISTED PULMONARY SVC/PX: CPT

## 2018-10-02 PROCEDURE — 85025 COMPLETE CBC W/AUTO DIFF WBC: CPT | Performed by: HOSPITALIST

## 2018-10-02 PROCEDURE — 25010000002 ENOXAPARIN PER 10 MG: Performed by: INTERNAL MEDICINE

## 2018-10-02 PROCEDURE — 80069 RENAL FUNCTION PANEL: CPT | Performed by: INTERNAL MEDICINE

## 2018-10-02 PROCEDURE — 99232 SBSQ HOSP IP/OBS MODERATE 35: CPT | Performed by: INTERNAL MEDICINE

## 2018-10-02 RX ORDER — FUROSEMIDE 80 MG
80 TABLET ORAL 2 TIMES DAILY
Qty: 60 TABLET | Refills: 0 | Status: SHIPPED | OUTPATIENT
Start: 2018-10-02 | End: 2018-12-19 | Stop reason: SDUPTHER

## 2018-10-02 RX ORDER — AMIODARONE HYDROCHLORIDE 200 MG/1
200 TABLET ORAL
Status: DISCONTINUED | OUTPATIENT
Start: 2018-10-02 | End: 2018-10-02 | Stop reason: HOSPADM

## 2018-10-02 RX ORDER — AMIODARONE HYDROCHLORIDE 200 MG/1
200 TABLET ORAL
Qty: 30 TABLET | Refills: 0 | Status: SHIPPED | OUTPATIENT
Start: 2018-10-02 | End: 2018-12-03 | Stop reason: SDUPTHER

## 2018-10-02 RX ADMIN — FUROSEMIDE 80 MG: 80 TABLET ORAL at 09:04

## 2018-10-02 RX ADMIN — IPRATROPIUM BROMIDE AND ALBUTEROL SULFATE 3 ML: .5; 3 SOLUTION RESPIRATORY (INHALATION) at 07:46

## 2018-10-02 RX ADMIN — AMIODARONE HYDROCHLORIDE 200 MG: 200 TABLET ORAL at 14:07

## 2018-10-02 RX ADMIN — IPRATROPIUM BROMIDE AND ALBUTEROL SULFATE 3 ML: .5; 3 SOLUTION RESPIRATORY (INHALATION) at 12:02

## 2018-10-02 RX ADMIN — METOPROLOL SUCCINATE 100 MG: 100 TABLET, FILM COATED, EXTENDED RELEASE ORAL at 09:04

## 2018-10-02 RX ADMIN — ENOXAPARIN SODIUM 90 MG: 100 INJECTION SUBCUTANEOUS at 06:31

## 2018-10-02 NOTE — DISCHARGE SUMMARY
Date of Admission: 9/28/2018  Date of Discharge:  10/2/2018  Primary Care Physician: Jerald Sebastian MD     Discharge Diagnosis:  Active Hospital Problems    Diagnosis Date Noted   • **Persistent atrial fibrillation (CMS/[facility]) [I48.1] 05/11/2018   • Chronic kidney disease, stage 3 (CMS/[facility]) [N18.3] 10/01/2018   • ARIEL (acute kidney injury) (CMS/[facility]) [N17.9] 10/01/2018   • Pleural effusion on right [J90] 09/28/2018   • Hyponatremia [E87.1] 04/13/2018   • Acute on chronic combined systolic and diastolic congestive heart failure (CMS/[facility]) [I50.43] 03/26/2018   • Essential hypertension [I10]       Resolved Hospital Problems    Diagnosis Date Noted Date Resolved   No resolved problems to display.       Presenting Problem/History of Present Illness:  Pleural effusion on right [J90]  Acute congestive heart failure, unspecified congestive heart failure type [I50.9]  Dyspnea, unspecified type [R06.00]     Hospital Course:  The patient is a 58 y.o. male with a history of combined CHF, persistent atrial fibrillation, and CKD who presented with increasing dyspnea.  He was found to have acute on chronic congestive heart failure as well as a right pleural effusion. Please see admisison H&P from 9/28/18 for further details.  He was started on IV diuresis.  He had a right thoracentesis on 9/28/18 with 2500cc of transudate out and improvement on follow up chest xray.  His volume status stabilized and his renal function remained stable.  He was transitioned to oral diuretics which he tolerated well.    His atrial fibrillation continued to pose a problem as his digoxin had to be discontinued due to his renal function and he is intolerant of many medications due to his chronically low blood pressure.  Cardiology decided on a SHYLA-assisted cardioversion and this was done on 10/1/18, after which he remained in sinus rhythm.  He was started on amiodarone therapy at discharge.  He will need to follow up with cardiology in a week.    He did  "continue to have mild hyponatremia which was thought to have been multi-factorial.  Nephrology has recommended that he remain on a 1500cc/day fluid restriction.    Exam Today:  Blood pressure 109/85, pulse 82, temperature 98.3 °F (36.8 °C), temperature source Oral, resp. rate 18, height 195.6 cm (77\"), weight 81.2 kg (179 lb), SpO2 100 %.  Constitutional: No distress.   Head: Normocephalic and atraumatic.   Mouth/Throat: Oropharynx is clear and moist.   Eyes: Pupils are equal, round, and reactive to light. Conjunctivae and EOM are normal.   Neck: Normal range of motion. Neck supple.   Cardiovascular: Normal rate, regular rhythm, and intact distal pulses.    Pulmonary/Chest: Effort normal. Clear to ascultation bilaterally.  No rales.  Abdominal: Soft. Bowel sounds are normal. There is no tenderness.   Musculoskeletal: He exhibits no edema. He exhibits no tenderness.   Neurological: He is alert and oriented x3  Skin: Skin is warm and dry. He is not diaphoretic.   Psychiatric: He has a normal mood and affect. His behavior is normal.   Nursing note and vitals reviewed.    Procedures Performed:  Hernando Caro   Adult Transesophageal Echo   Order# [id number]   Reading physician: Christine Mo MD Ordering physician: Brett Still MD Study date: 10/1/18   Patient Information     Patient Name  Hernando Caro MRN  [id number] Sex  Male  (Age)  1960 (58 y.o.)   Admission Information     Admission Date/Time Discharge Date/Time Room/Bed   18  0104  E664/1   Sedation Narrator Report     Sedation Narrator Report   Interpretation Summary     · Estimated EF appears to be in the range of 21 - 25%. There is left ventricular global hypokinesis noted. The left ventricular cavity is severely dilated. There is no evidence of a left ventricular thrombus present.  · Right ventricular cavity is dilated. Severely reduced right ventricular systolic function noted.  · Right atrial cavity size is dilated.  · Mild mitral " valve regurgitation is present  · Moderate tricuspid valve regurgitation is present.  · Left atrial cavity size is severely dilated. There is light spontaneous echo contrast present in the atrial body and in the atrial appendage. The left atrial appendage was visualized through multiple planes. No evidence of a left atrial appendage thrombus was present. Small patent foramen ovale present with right to left shunting.     Left Ventricle Estimated EF appears to be in the range of 21 - 25%. There is left ventricular global hypokinesis noted.   The left ventricular cavity is severely dilated. There is no evidence of a left ventricular thrombus present.      Right Ventricle Right ventricular cavity is dilated. Severely reduced right ventricular systolic function noted.      Left Atrium Left atrial cavity size is severely dilated. There is light spontaneous echo contrast present in the atrial body and in the atrial appendage. The left atrial appendage was visualized through multiple planes. No evidence of a left atrial appendage thrombus was present. Small patent foramen ovale present with right to left shunting. Normal pulmonary vein flow.      Right Atrium Right atrial cavity size is dilated.      Aortic Valve The aortic valve is structurally normal. Trace aortic valve regurgitation is present. No aortic valve stenosis is present.      Mitral Valve The mitral valve is normal in structure. Mild mitral valve regurgitation is present. No significant mitral valve stenosis is present.      Tricuspid Valve The tricuspid valve is normal. No tricuspid valve stenosis is present. Moderate tricuspid valve regurgitation is present. Estimated right ventricular systolic pressure from tricuspid regurgitation is normal (<35 mmHg).      Pulmonic Valve The pulmonic valve is grossly normal in structure.      Greater Vessels No dilation of the aortic root is present. No dilation of the sinuses of Valsalva is present. No dilation of the  aortic arch is present. There is no plaque in the aortic arch present. No dilation of the descending aorta present. There is no plaque in the descending aorta present.      Pericardium There is no evidence of pericardial effusion.                Consults:   Consults     Date and Time Order Name Status Description    9/28/2018 1349 Inpatient Cardiology Consult Completed     9/28/2018 1253 Inpatient Nephrology Consult      9/28/2018 1242 Inpatient Pulmonology Consult Completed     9/28/2018 1242 Inpatient Cardiology Consult      9/28/2018 0307 [facility] (on-call MD unless specified) Completed            Discharge Disposition:  Home or Self Care    Discharge Medications:     Discharge Medications      New Medications      Instructions Start Date   amiodarone 200 MG tablet  Commonly known as:  PACERONE   200 mg, Oral, Every 24 Hours Scheduled         Changes to Medications      Instructions Start Date   furosemide 80 MG tablet  Commonly known as:  LASIX  What changed:  · medication strength  · how much to take  · how to take this  · when to take this  · additional instructions   80 mg, Oral, 2 Times Daily      warfarin 5 MG tablet  Commonly known as:  COUMADIN  What changed:  additional instructions   5 mg, Oral, Daily Warfarin         Continue These Medications      Instructions Start Date   albuterol 108 (90 Base) MCG/ACT inhaler  Commonly known as:  PROVENTIL HFA;VENTOLIN HFA   2 puffs, Inhalation, Every 4 Hours PRN      atorvastatin 10 MG tablet  Commonly known as:  LIPITOR   10 mg, Oral, Nightly      metoprolol succinate  MG 24 hr tablet  Commonly known as:  TOPROL-XL   100 mg, Oral, Every 12 Hours Scheduled      potassium chloride 10 MEQ CR tablet  Commonly known as:  K-DUR   10 mEq, Oral, Daily             Discharge Diet:   Diet Instructions     Diet: Renal, Cardiac; Thin       Discharge Diet:   Renal  Cardiac       Fluid Consistency:  Thin    Fluid Restriction per day:  1500 mL Fluid          Activity at  Discharge:   Activity Instructions     Activity as Tolerated             Follow-up Appointments:  Future Appointments  Date Time Provider Department Center   10/5/2018 1:30 PM [facility] None   10/9/2018 4:00 PM [facility]     Additional Instructions for the Follow-ups that You Need to Schedule     Discharge Follow-up with PCP    As directed      Currently Documented PCP:  Jerald Sebastian MD  PCP Phone Number:  [phone]    Follow Up Details:  1 week         Discharge Follow-up with Specialty: Follow up with APRN in 1 week for EKG to make sure remains in NSR and Dr Mast in 1 month.  Our office will call with appts    As directed      Specialty:  Follow up with APRN in 1 week for EKG to make sure remains in NSR and Dr Mast in 1 month.  Our office will call with appts               Test Results Pending at Discharge:   Order Current Status    Body Fluid Culture - Body Fluid, Pleural Cavity Preliminary result           Shane Carr MD  10/02/18  2:00 PM    Time Spent on Discharge Activities: Greater than 30 minutes.

## 2018-10-02 NOTE — PROGRESS NOTES
[facility] INPATIENT PROGRESS NOTE         [address]    10/2/2018      PATIENT IDENTIFICATION:  Name: Hernando Caro ADMIT: 2018   : 1960  PCP: Jerald Sebastian MD    MRN: [id number] LOS: 4 days   AGE/SEX: 58 y.o. male  ROOM: [facility]                     LOS 4    Reason for visit: Follow-up large right effusion and COPD      SUBJECTIVE:   Resting comfortably. Not requiring supplemental oxygen. Hoping to go home soon.     Objective   OBJECTIVE:    Vital Sign Min/Max for last 24 hours  Temp  Min: 98.3 °F (36.8 °C)  Max: 98.9 °F (37.2 °C)   BP  Min: 88/62  Max: 127/93   Pulse  Min: 75  Max: 119   Resp  Min: 14  Max: 18   SpO2  Min: 94 %  Max: 100 %   No Data Recorded   Weight  Min: 77.6 kg (171 lb)  Max: 81.2 kg (179 lb)                         Body mass index is 21.23 kg/m².    Intake/Output Summary (Last 24 hours) at 10/02/18 1147  Last data filed at 10/02/18 0630   Gross per 24 hour   Intake             1200 ml   Output             1625 ml   Net             -425 ml         Exam:  GEN:  No distress, appears stated age  EYES:   PERRL, anicteric sclera  ENT:    External ears/nose normal, OP clear  NECK:  No adenopathy, midline trachea  LUNGS: Normal chest on inspection, palpation and diminished breath sounds on auscultation  CV:  Normal S1S2, without murmur  ABD:  Non tender, non distended, no hepatosplenomegaly, +BS  EXT:  No cyanosis or clubbing    Scheduled meds:      atorvastatin 10 mg Oral Nightly   enoxaparin 1 mg/kg Subcutaneous Q12H   furosemide 80 mg Oral BID   ipratropium-albuterol 3 mL Nebulization 4x Daily - RT   metoprolol succinate  mg Oral Q12H   warfarin 7.5 mg Oral Daily     IV meds:                          hold 1 each     Data Review:    Results from last 7 days  Lab Units 10/02/18  0550 10/01/18  0627 18  0703 18  0531 18  2300   SODIUM mmol/L 133* 136 125* 122* 121*   POTASSIUM mmol/L 4.0 3.7 3.4* 4.5 4.8   CHLORIDE mmol/L 91* 92* 86* 85* 80*    CO2 mmol/L 30.6* 30.7* 27.2 26.0 27.7   BUN mg/dL 24* 26* 27* 33* 32*   CREATININE mg/dL 1.54* 1.54* 1.56* 1.83* 2.03*   GLUCOSE mg/dL 103* 99 113* 100* 113*   CALCIUM mg/dL 9.0 9.0 8.9 9.1 8.8         Estimated Creatinine Clearance: 60.1 mL/min (A) (by C-G formula based on SCr of 1.54 mg/dL (H)).    Results from last 7 days  Lab Units 10/02/18  0550 10/01/18  0627 09/30/18  0703 09/29/18  0531 09/28/18  0117   WBC 10*3/mm3 5.90 7.14 8.26 8.95 7.46   HEMOGLOBIN g/dL 13.4* 14.2 14.5 15.6 15.3   PLATELETS 10*3/mm3 227 227 220 247 191       Results from last 7 days  Lab Units 10/02/18  0551 10/01/18  0627 09/30/18  0703 09/29/18  0531 09/28/18  1429   INR  2.16* 1.80* 1.75* 1.90* 1.82*       Results from last 7 days  Lab Units 09/28/18  0117   ALT (SGPT) U/L 30   AST (SGOT) U/L 46*                 Microbiology reviewed    CXR 9/30 viewed shows partial improvement        )Assessment   ASSESSMENT:      Active Hospital Problems    Diagnosis Date Noted   • **Persistent atrial fibrillation (CMS/[facility]) [I48.1] 05/11/2018   • Chronic kidney disease, stage 3 (CMS/[facility]) [N18.3] 10/01/2018   • ARIEL (acute kidney injury) (CMS/[facility]) [N17.9] 10/01/2018   • Pleural effusion on right [J90] 09/28/2018   • Hyponatremia [E87.1] 04/13/2018   • Acute on chronic combined systolic and diastolic congestive heart failure (CMS/[facility]) [I50.43] 03/26/2018   • Essential hypertension [I10]       Resolved Hospital Problems    Diagnosis Date Noted Date Resolved   No resolved problems to display.     Large right pleural effusion: Status post large volume thoracentesis  COPD  Acute on chronic systolic CHF  Chronic atrial fibrillation  Hyponatremia  Acute kidney injury on chronic kidney disease      PLAN:  Status post large-volume thoracentesis with improvement in dyspnea.  Encourage pulmonary toilet.  Bronchodilators for COPD symptoms.  Pleural effusion appears to be transudate.  Receiving diuretics for CHF.  Repeat chest x-ray for follow-up shows  improvement.  Okay for d/c from my standpoint.    Mansoor Noel MD  Pulmonary and Critical Care Medicine  [facility], [facility]  10/2/2018    11:47 AM

## 2018-10-02 NOTE — PLAN OF CARE
Problem: Patient Care Overview  Goal: Plan of Care Review  Outcome: Ongoing (interventions implemented as appropriate)   10/02/18 0523   Coping/Psychosocial   Plan of Care Reviewed With patient   Plan of Care Review   Progress improving   OTHER   Outcome Summary vitals stable. pt denies pain and shortness of breath. pt had a SHYLA and cardioversion yesterday. pt remains in NSR. 1500 cc fluid restriction per day. pt may get discharged today. will continue to monitor.        Problem: Cardiac: Heart Failure (Adult)  Goal: Signs and Symptoms of Listed Potential Problems Will be Absent, Minimized or Managed (Cardiac: Heart Failure)  Outcome: Ongoing (interventions implemented as appropriate)      Problem: Arrhythmia/Dysrhythmia (Symptomatic) (Adult)  Goal: Signs and Symptoms of Listed Potential Problems Will be Absent, Minimized or Managed (Arrhythmia/Dysrhythmia)  Outcome: Ongoing (interventions implemented as appropriate)

## 2018-10-02 NOTE — PROGRESS NOTES
Hospital Follow Up    LOS:  LOS: 4 days   Patient Name: Hernando Caro  Age/Sex: 58 y.o. male  : 1960  MRN: [id number]    Day of Service: 10/02/18   Length of Stay: 4  Encounter Provider: Amrit Mast MD  Place of Service: [facility]  Patient Care Team:  Jerald Sebastian MD as PCP - General (Internal Medicine)  Jerald Sebastian MD as PCP - Family Medicine  Jerald Sebastian MD Haraden, Brennan M, MD as Consulting Physician (Cardiology)    Subjective:     Chief Complaint: Atrial fibrillation, acute on chronic combined CHF.    Interval History: patient is back to baseline.  He has minimal shortness of breath.  He did ask about disability which I have encouraged him to pursue a long time ago.  He is talking with  about this.    Objective:     Objective:  Temp:  [98.3 °F (36.8 °C)-98.9 °F (37.2 °C)] 98.3 °F (36.8 °C)  Heart Rate:  [] 75  Resp:  [14-18] 16  BP: ()/(62-94) 114/85     Intake/Output Summary (Last 24 hours) at 10/02/18 1042  Last data filed at 10/02/18 0630   Gross per 24 hour   Intake             1200 ml   Output             1625 ml   Net             -425 ml     Body mass index is 21.23 kg/m².  1    10/01/18  0559 10/01/18  1419 10/02/18  0557   Weight: (on lasix; much output, fluid restriction) 77.6 kg (171 lb) 81.2 kg (179 lb)     Weight change: -2.268 kg (-5 lb)      Physical Exam:   General : Alert, cooperative, in no acute distress.  Neuro: alert,cooperative and oriented  Lungs: CTAB. Normal respiratory effort and rate.  CV:: Regular rate and rhythm, normal S1 and S2, no murmurs, gallops or rubs.  ABD: Soft, nontender, non-distended. positive bowel sounds  Extr: No edema or cyanosis, moves all extremities    Lab Review:     Results from last 7 days  Lab Units 10/02/18  0550 10/01/18  0627  18  0117   SODIUM mmol/L 133* 136  < > 124*   POTASSIUM mmol/L 4.0 3.7  < > 4.9   CHLORIDE mmol/L 91* 92*  < > 83*   CO2  mmol/L 30.6* 30.7*  < > 26.3   BUN mg/dL 24* 26*  < > 22*   CREATININE mg/dL 1.54* 1.54*  < > 1.61*   GLUCOSE mg/dL 103* 99  < > 108*   CALCIUM mg/dL 9.0 9.0  < > 9.3   AST (SGOT) U/L  --   --   --  46*   ALT (SGPT) U/L  --   --   --  30   < > = values in this interval not displayed.    Results from last 7 days  Lab Units 09/28/18  1429 09/28/18  0117   TROPONIN T ng/mL 0.017 0.028       Results from last 7 days  Lab Units 10/02/18  0550 10/01/18  0627   WBC 10*3/mm3 5.90 7.14   HEMOGLOBIN g/dL 13.4* 14.2   HEMATOCRIT % 40.7 42.0   PLATELETS 10*3/mm3 227 227       Results from last 7 days  Lab Units 10/02/18  0551 10/01/18  0627  09/28/18  0117   INR  2.16* 1.80*  < > 1.66*   APTT seconds  --   --   --  37.8*   < > = values in this interval not displayed.    Results from last 7 days  Lab Units 09/29/18  0531   MAGNESIUM mg/dL 2.1           Invalid input(s): LDLCALC    Results from last 7 days  Lab Units 09/28/18  0117   PROBNP pg/mL 7,776.0*       Results from last 7 days  Lab Units 09/28/18  0117   TSH mIU/mL 4.710*     Current Medications:   Scheduled Meds:  atorvastatin 10 mg Oral Nightly   enoxaparin 1 mg/kg Subcutaneous Q12H   furosemide 80 mg Oral BID   ipratropium-albuterol 3 mL Nebulization 4x Daily - RT   metoprolol succinate  mg Oral Q12H   warfarin 7.5 mg Oral Daily     Continuous Infusions:  hold 1 each       Allergies:  Allergies   Allergen Reactions   • Ace Inhibitors Swelling   • Lisinopril Angioedema       Assessment:     Principal Problem:    Persistent atrial fibrillation (CMS/HCC)  Active Problems:    Essential hypertension    Acute on chronic combined systolic and diastolic congestive heart failure (CMS/HCC)    Hyponatremia    Pleural effusion on right    Chronic kidney disease, stage 3 (CMS/HCC)    ARIEL (acute kidney injury) (CMS/HCC)        Plan:     1.   paroxysmal atrial fibrillation.  Patient is back in sinus rhythm and is anticoagulated.  Patient cardioverted yesterday.  2.   Nonischemic cardiomyopathy EF 20-25%.  3.  Status post right thoracentesis with significant improvement of his respiratory status.  4.  Acute on chronic renal injury  5.  Hyponatremia  6.  COPD  7.  Alcohol use  8.  Patient nearing baseline agree with current plan.    Amrit Mast MD  10/02/18  10:42 AM

## 2018-10-02 NOTE — DISCHARGE INSTR - LAB
1 week Christina Mejia for EKG.   1 month Dr.[facility]  [address]  [phone]

## 2018-10-02 NOTE — PROGRESS NOTES
Case Management Discharge Note    Final Note: Pt discharged home. WAYNE Caldera    Destination     No service has been selected for the patient.      Durable Medical Equipment     No service has been selected for the patient.      Dialysis/Infusion     No service has been selected for the patient.      Home Medical Care     No service has been selected for the patient.      Social Care     No service has been selected for the patient.        Other: Other (Private Auto)    Final Discharge Disposition Code: 01 - home or self-care

## 2018-10-02 NOTE — PROGRESS NOTES
Continued Stay Note  [facility]     Patient Name: Hernando Caro  MRN: [id number]  Today's Date: 10/2/2018    Admit Date: 9/28/2018          Discharge Plan     Row Name 10/02/18 1345       Plan    Plan Plan home.  WAYNE Caldera    Patient/Family in Agreement with Plan yes    Plan Comments Spoke with pt at bedside.  Pt denies any discharge needs. Plan home.  ADIN Palma RN              Discharge Codes    No documentation.       Expected Discharge Date and Time     Expected Discharge Date Expected Discharge Time    Oct 2, 2018             Alicia Villa, RN

## 2018-10-02 NOTE — PROGRESS NOTES
NEPHROLOGY PROGRESS NOTE    PATIENT IDENTIFICATION:   Name:  Hernando Caro      MRN:  [id number]     58 y.o.  male             Reason for visit: ARIEL vs ARIEL/CKD3    SUBJECTIVE:   Feels fine .  Not soa. No edema.  Eating.  Wants to go home  OBJECTIVE:  Vitals:    10/02/18 0557 10/02/18 0744 10/02/18 0746 10/02/18 1202   BP:  114/85     BP Location:  Right arm     Patient Position:  Lying     Pulse:  77 75 79   Resp:  16 16 16   Temp:  98.3 °F (36.8 °C)     TempSrc:  Oral     SpO2:  100% 100%    Weight: 81.2 kg (179 lb)      Height:               Body mass index is 21.23 kg/m².    Intake/Output Summary (Last 24 hours) at 10/02/18 1244  Last data filed at 10/02/18 0630   Gross per 24 hour   Intake             1200 ml   Output             1625 ml   Net             -425 ml     Wt Readings from Last 1 Encounters:   10/02/18 0557 81.2 kg (179 lb)   10/01/18 1419 77.6 kg (171 lb)   10/01/18 0558 79.8 kg (176 lb)   09/30/18 0500 85.7 kg (189 lb)   09/29/18 0508 89.7 kg (197 lb 12.8 oz)   09/28/18 0521 91.2 kg (201 lb 2 oz)   09/28/18 0119 92.5 kg (204 lb)     Wt Readings from Last 3 Encounters:   10/02/18 81.2 kg (179 lb)   09/25/18 90.3 kg (199 lb)   09/17/18 90.7 kg (200 lb)         Exam:  NAD; pleasant; oriented; looks older than stated age  Oral mucosa moist.   Lungs  Few scattered rales.  not labored on RA  Heart RRR, no rub, +syst m  Abd Soft, + bs, nontender.   Trace  edema lower ext        Scheduled meds:      amiodarone 200 mg Oral Q24H   atorvastatin 10 mg Oral Nightly   enoxaparin 1 mg/kg Subcutaneous Q12H   furosemide 80 mg Oral BID   ipratropium-albuterol 3 mL Nebulization 4x Daily - RT   metoprolol succinate  mg Oral Q12H   warfarin 7.5 mg Oral Daily     IV meds:                          hold 1 each       Data Review:      Results from last 7 days  Lab Units 10/02/18  0550 10/01/18  0627 09/30/18  0703  09/28/18  0117   SODIUM mmol/L 133* 136 125*  < > 124*   POTASSIUM mmol/L 4.0 3.7 3.4*  < > 4.9    CHLORIDE mmol/L 91* 92* 86*  < > 83*   CO2 mmol/L 30.6* 30.7* 27.2  < > 26.3   BUN mg/dL 24* 26* 27*  < > 22*   CREATININE mg/dL 1.54* 1.54* 1.56*  < > 1.61*   CALCIUM mg/dL 9.0 9.0 8.9  < > 9.3   BILIRUBIN mg/dL  --   --   --   --  2.9*   ALK PHOS U/L  --   --   --   --  273*   ALT (SGPT) U/L  --   --   --   --  30   AST (SGOT) U/L  --   --   --   --  46*   GLUCOSE mg/dL 103* 99 113*  < > 108*   < > = values in this interval not displayed.  Estimated Creatinine Clearance: 60.1 mL/min (A) (by C-G formula based on SCr of 1.54 mg/dL (H)).    Results from last 7 days  Lab Units 09/28/18  2212   SODIUM UR mmol/L <20   CREATININE UR mg/dL 124.5   PROTEIN TOTAL URINE mg/dL 38.0       Results from last 7 days  Lab Units 10/02/18  0550 09/29/18  0531   MAGNESIUM mg/dL  --  2.1   PHOSPHORUS mg/dL 3.6 4.2         Results from last 7 days  Lab Units 10/02/18  0550 10/01/18  0627 09/30/18  0703 09/29/18  0531 09/28/18  0117   WBC 10*3/mm3 5.90 7.14 8.26 8.95 7.46   HEMOGLOBIN g/dL 13.4* 14.2 14.5 15.6 15.3   PLATELETS 10*3/mm3 227 227 220 247 191         Results from last 7 days  Lab Units 10/02/18  0551 10/01/18  0627 09/30/18  0703 09/29/18  0531 09/28/18  1429   INR  2.16* 1.80* 1.75* 1.90* 1.82*             ASSESSMENT:   Principal Problem:    Persistent atrial fibrillation (CMS/HCC)  Active Problems:    Essential hypertension    Acute on chronic combined systolic and diastolic congestive heart failure (CMS/HCC)    Hyponatremia    Pleural effusion on right    Chronic kidney disease, stage 3 (CMS/HCC)    ARIEL (acute kidney injury) (CMS/HCC)    1.  ARIEL vs ARIEL/CKD3, non-oliguric, improving.  He has had other abnl SCr's earlier this year.  ARIEL  prerenal from CHF and low cardiac output. FENa less than 1%; bland urine with few cells only and very modest proteinuria.     2. Systolic  and diastolic CHF, valvular heart disease.   3.  Atrial fib/flutter: in NSR after CV.  4.  Daily EtOH: likely aggravating his arrhythmia and CM.  AST  is elevated  5. COPD: will also contribute to water retention and hypoNa      PLAN:  1.  Ok for dc  2. He can follow up with PCP.  3, Reiterated po fluid restriction to 1500 cc a day  4. DW Dr. Carr.     Alexia Martines MD  10/2/2018    12:44 PM

## 2018-10-03 ENCOUNTER — READMISSION MANAGEMENT (OUTPATIENT)
Dept: CALL CENTER | Facility: HOSPITAL | Age: 58
End: 2018-10-03

## 2018-10-03 LAB
BACTERIA FLD CULT: NORMAL
GRAM STN SPEC: NORMAL
GRAM STN SPEC: NORMAL

## 2018-10-03 NOTE — OUTREACH NOTE
Prep Survey      Responses   Facility patient discharged from?  [facility]   Is patient eligible?  Yes   Discharge diagnosis  CHF with Right PE,  AFIB,  SHYLA assisted cardioversion   Does the patient have one of the following disease processes/diagnoses(primary or secondary)?  CHF   Does the patient have Home health ordered?  No   Is there a DME ordered?  Yes   What DME was ordered?  O2, nebulizer   Prep survey completed?  Yes          Dayana Tariq RN

## 2018-10-06 ENCOUNTER — READMISSION MANAGEMENT (OUTPATIENT)
Dept: CALL CENTER | Facility: HOSPITAL | Age: 58
End: 2018-10-06

## 2018-10-06 NOTE — OUTREACH NOTE
CHF Week 1 Survey      Responses   Facility patient discharged from?  [facility]   Does the patient have one of the following disease processes/diagnoses(primary or secondary)?  CHF   Is there a successful TCM telephone encounter documented?  No   CHF Week 1 attempt successful?  Yes   Call start time  1249   Call end time  1255   Discharge diagnosis  CHF with Right PE,  AFIB,  SHYLA assisted cardioversion   Is patient permission given to speak with other caregiver?  Yes   List who call center can speak with  SO   Person spoke with today (if not patient) and relationship  Mara- SO   Meds reviewed with patient/caregiver?  Yes   Is the patient having any side effects they believe may be caused by any medication additions or changes?  No   Does the patient have all medications ordered at discharge?  Yes   Is the patient taking all medications as directed (includes completed medication regime)?  Yes   Does the patient have a primary care provider?   Yes   Does the patient have an appointment with their PCP within 7 days of discharge?  No   What is preventing the patient from scheduling follow up appointments within 7 days of discharge?  Haven't had time   Nursing Interventions  Educated patient on importance of making appointment   Has the patient kept scheduled appointments due by today?  N/A   DME comments  Home O2 and neb in place.    Psychosocial issues?  No   Did the patient receive a copy of their discharge instructions?  Yes   Nursing interventions  Reviewed instructions with patient   What is the patient's perception of their health status since discharge?  Improving   Nursing interventions  Nurse provided patient education   Is the patient weighing daily?  No   Does the patient have scales?  Yes   Daily weight interventions  Education provided on importance of daily weight   Is the patient able to teach back Heart Failure diet management?  Yes   Is the patient able to teach back Heart Failure Zones?  Yes   Is  the patient/caregiver able to teach back the hierarchy of who to call/visit for symptoms/problems? PCP, Specialist, Home health nurse, Urgent Care, ED, 911  Yes   Additional teach back comments  Pt has stopped smoking and drinking alcohol per SO. SO reports that the pt's cough and energy level is much better. He is feeling better than has in while.     CHF Week 1 call completed?  Yes          Viola Buchanan RN

## 2018-10-06 NOTE — OUTREACH NOTE
CHF Week 1 Survey      Responses   Facility patient discharged from?  [facility]   Does the patient have one of the following disease processes/diagnoses(primary or secondary)?  CHF   Is there a successful TCM telephone encounter documented?  No   CHF Week 1 attempt successful?  Yes   Call start time  1249   Call end time  1255   Discharge diagnosis  CHF with Right PE,  AFIB,  SHYLA assisted cardioversion   Is patient permission given to speak with other caregiver?  Yes   List who call center can speak with  SO   Person spoke with today (if not patient) and relationship  Mara- SO   Meds reviewed with patient/caregiver?  Yes   Is the patient having any side effects they believe may be caused by any medication additions or changes?  No   Does the patient have all medications ordered at discharge?  Yes   Is the patient taking all medications as directed (includes completed medication regime)?  Yes   Does the patient have a primary care provider?   Yes   Does the patient have an appointment with their PCP within 7 days of discharge?  No   What is preventing the patient from scheduling follow up appointments within 7 days of discharge?  Haven't had time   Nursing Interventions  Educated patient on importance of making appointment   Has the patient kept scheduled appointments due by today?  N/A   DME comments  Home O2 and neb in place.    Psychosocial issues?  No   Did the patient receive a copy of their discharge instructions?  Yes   Nursing interventions  Reviewed instructions with patient   What is the patient's perception of their health status since discharge?  Improving   Nursing interventions  Nurse provided patient education   Is the patient weighing daily?  No   Does the patient have scales?  Yes   Daily weight interventions  Education provided on importance of daily weight   Is the patient able to teach back Heart Failure diet management?  Yes   Is the patient able to teach back Heart Failure Zones?  Yes   Is  the patient/caregiver able to teach back the hierarchy of who to call/visit for symptoms/problems? PCP, Specialist, Home health nurse, Urgent Care, ED, 911  Yes   Additional teach back comments  Pt has stopped smoking and drinking alcohol per SO.     CHF Week 1 call completed?  Yes          Viola Buchanan RN

## 2018-10-10 ENCOUNTER — ANTICOAGULATION VISIT (OUTPATIENT)
Dept: PHARMACY | Facility: HOSPITAL | Age: 58
End: 2018-10-10

## 2018-10-10 DIAGNOSIS — Z79.01 LONG TERM (CURRENT) USE OF ANTICOAGULANTS: ICD-10-CM

## 2018-10-10 LAB
INR PPP: 3 (ref 0.91–1.09)
PROTHROMBIN TIME: 36.2 SECONDS (ref 10–13.8)

## 2018-10-10 PROCEDURE — 36416 COLLJ CAPILLARY BLOOD SPEC: CPT

## 2018-10-10 PROCEDURE — G0463 HOSPITAL OUTPT CLINIC VISIT: HCPCS

## 2018-10-10 PROCEDURE — 85610 PROTHROMBIN TIME: CPT

## 2018-10-10 RX ORDER — WARFARIN SODIUM 5 MG/1
5 TABLET ORAL
Qty: 30 TABLET | Refills: 3 | Status: SHIPPED | OUTPATIENT
Start: 2018-10-10 | End: 2019-02-11 | Stop reason: SDUPTHER

## 2018-10-10 RX ORDER — POTASSIUM CHLORIDE 750 MG/1
10 TABLET, FILM COATED, EXTENDED RELEASE ORAL DAILY
Qty: 30 TABLET | Refills: 11 | Status: CANCELLED | OUTPATIENT
Start: 2018-10-10

## 2018-10-10 NOTE — PROGRESS NOTES
Anticoagulation Clinic Progress Note  Anticoagulation Summary  As of 10/10/2018    INR goal:   2.0-3.0   TTR:   --   Today's INR:   3.0   Warfarin maintenance plan:   5 mg every day   Weekly warfarin total:   35 mg   Plan last modified:   [facility] (10/10/2018)   Next INR check:   10/17/2018   Priority:   Maintenance   Target end date:   Indefinite    Indications    Long term (current) use of anticoagulants [Z79.01]             Anticoagulation Episode Summary     INR check location:       Preferred lab:       Send INR reminders to:   [facility] [place]    Comments:         Anticoagulation Care Providers     Provider Role Specialty Phone number    Amrit Mast MD Referring Cardiology [phone]    Ginger Parish RPH Responsible Pharmacy             Drug interactions: None  Diet: Consistent    Clinic Interview:  Patient Findings     Positives:   Hospital admission    Negatives:   Signs/symptoms of thrombosis, Signs/symptoms of bleeding,   Laboratory test error suspected, Change in health, Change in alcohol use,   Change in activity, Upcoming invasive procedure, Emergency department   visit, Upcoming dental procedure, Missed doses, Extra doses, Change in   medications, Change in diet/appetite, Bruising, Other complaints    Comments:   Discharged from hospital last Wednesday, for exacerbation of   CHF  Patient states that he has been taking warfarin 5mg daily since last   Wednesday from discharge      Clinical Outcomes     Negatives:   Major bleeding event, Thromboembolic event,   Anticoagulation-related hospital admission, Anticoagulation-related ED   visit, Anticoagulation-related fatality    Comments:   Discharged from hospital last Wednesday, for exacerbation of   CHF  Patient states that he has been taking warfarin 5mg daily since last   Wednesday from discharge        Education:  Hernando Caro is a new start in the [facility]. We discussed the followin)  Warfarin's indication, mechanism, and dosing  2) Enforced the importance of taking warfarin as instructed and at the same time every day, preferably in the evening so that we can make dose adjustments more easily following subsequent clinic visits  3) What he should do about a missed dose; pts can take missed doses within about 12 hours of their usual scheduled dose, but he was instructed on the importance of not doubling up on doses unless told to do so by the [facility]  4) Explained possible side effects of warfarin therapy, including increased risk of bleeding, s/sx of bleeding and s/sx of any additional clots/PE/CVA.   5) Discussed monitoring of warfarin, the INR, goal INR range, and the frequency of monitoring  6) Reviewed drug/food/tobacco/EtOH interactions and provided written information covering these topics in more detail, explaining that green, leafy vegetables interact most heavily with warfarin  7) Instructed the pt not to take or discontinue any medications without informing his physician/pharmacist and reminded him to inform us of any dietary changes, as well  8) Explained that he would be coming into the clinic more frequently in these first few weeks of therapy as we try to adjust his dose and achieve a therapeutic INR x 2 consecutive readings. Once that is achieved, patient will follow up in clinic every 4 weeks, on average.    He stated no problems with transportation or scheduling clinic appts in this manner. he expressed understanding of the information provided and has no additional questions at this time.    Hernando Landaverdeach was presented with a copy of the Patients Rights and Responsibilities. he expressed verbal consent and agreement to receive care in the [facility] under the current collaborative care agreement with [facility].       INR History:  Previous INR data from [facility] is recorded in [facility] and scanned into the  patient's chart in Epic. These results have been analyzed and reviewed.      Plan:  1. INR is Therapeutic today- see above in Anticoagulation Summary.   Will instruct Hernando Caro to Continue their warfarin regimen- see above in Anticoagulation Summary.    Discharged from hospital last Wednesday, for exacerbation of CHF  Patient states that he has been taking warfarin 5mg daily since last Wednesday from discharge  2. Follow up in 1 week  3. Patient declines warfarin refills.  4. Verbal and written information provided. Patient expresses understanding and has no further questions at this time.    Ginger Parish [facility]

## 2018-10-12 ENCOUNTER — OFFICE VISIT (OUTPATIENT)
Dept: CARDIOLOGY | Facility: CLINIC | Age: 58
End: 2018-10-12

## 2018-10-12 VITALS
DIASTOLIC BLOOD PRESSURE: 90 MMHG | BODY MASS INDEX: 21.02 KG/M2 | WEIGHT: 178 LBS | SYSTOLIC BLOOD PRESSURE: 134 MMHG | OXYGEN SATURATION: 98 % | HEIGHT: 77 IN | HEART RATE: 68 BPM

## 2018-10-12 DIAGNOSIS — I10 ESSENTIAL HYPERTENSION: ICD-10-CM

## 2018-10-12 DIAGNOSIS — I48.0 PAROXYSMAL ATRIAL FIBRILLATION (HCC): Primary | ICD-10-CM

## 2018-10-12 DIAGNOSIS — I50.42 CHRONIC COMBINED SYSTOLIC (CONGESTIVE) AND DIASTOLIC (CONGESTIVE) HEART FAILURE (HCC): ICD-10-CM

## 2018-10-12 PROBLEM — I50.43 ACUTE ON CHRONIC COMBINED SYSTOLIC AND DIASTOLIC CONGESTIVE HEART FAILURE (HCC): Status: RESOLVED | Noted: 2018-03-26 | Resolved: 2018-10-12

## 2018-10-12 PROCEDURE — 93000 ELECTROCARDIOGRAM COMPLETE: CPT | Performed by: NURSE PRACTITIONER

## 2018-10-12 PROCEDURE — 99214 OFFICE O/P EST MOD 30 MIN: CPT | Performed by: NURSE PRACTITIONER

## 2018-10-12 RX ORDER — METOPROLOL SUCCINATE 100 MG/1
100 TABLET, EXTENDED RELEASE ORAL EVERY 12 HOURS SCHEDULED
Qty: 60 TABLET | Refills: 3 | Status: SHIPPED | OUTPATIENT
Start: 2018-10-12 | End: 2019-04-08 | Stop reason: SDUPTHER

## 2018-10-12 NOTE — PROGRESS NOTES
Patient Name: Hernando Caro  :1960  Age: 58 y.o.  Primary Cardiologist: Amrit Mast MD  Encounter Provider:  JANET Platt      Chief Complaint:   Chief Complaint   Patient presents with   • pleural effusions   • Follow-up   • Congestive Heart Failure   • Atrial Fibrillation         Atrial Fibrillation   Presents for follow-up visit. Symptoms are negative for chest pain and shortness of breath. The symptoms have been resolved. Past medical history includes atrial fibrillation and CHF. There is no history of AICD. There are no medication compliance problems.   Congestive Heart Failure   Presents for follow-up visit. Pertinent negatives include no chest pain or shortness of breath. The symptoms have been stable.      This patient is new to me.  I have reviewed the prior medical records.     Hernando Caro is a 58 y.o. male with a history significant for persistent atrial fibrillation, chronic kidney disease, right pleural effusion, chronic combined heart failure, hypertension.  Patient was admitted to the hospital -2018.  He presented with increasing shortness of breath.  He was found to have acute on chronic congestive heart failure as well as a right pleural effusion.  He was diuresed and underwent a right thoracentesis on 2018 with 2500 cc of transudate out with improvement on follow-up chest x-ray.  While hospitalized his atrial fibrillation continued to pose a problem as his digoxin needed to be discontinued secondary to renal failure.  He is intolerant of many medications secondary to chronically low blood pressure.  Patient underwent a SHYLA assisted cardioversion on 2018 and he remained in sinus rhythm.  He was started on amiodarone at discharge.    Patient presents today for hospital follow-up.  Patient states that he feels somewhat better.  He states that he is no longer having any episodes of shortness of breath or fatigue.  He states  that he is now able to sleep lying flat where before he had to sleep upright.  He states that he is not having any episodes of chest pain, heart palpitations, tachycardia, lightheadedness, swelling.  He reports that he is doing well on current medication regimen.        The following portions of the patient's history were reviewed and updated as appropriate: allergies, current medications, past family history, past medical history, past social history, past surgical history and problem list.    Current Outpatient Prescriptions on File Prior to Visit   Medication Sig Dispense Refill   • albuterol (PROVENTIL HFA;VENTOLIN HFA) 108 (90 Base) MCG/ACT inhaler Inhale 2 puffs Every 4 (Four) Hours As Needed for Wheezing or Shortness of Air. 1 inhaler 5   • amiodarone (PACERONE) 200 MG tablet Take 1 tablet by mouth Daily. 30 tablet 0   • atorvastatin (LIPITOR) 10 MG tablet Take 1 tablet by mouth Every Night. 30 tablet 3   • furosemide (LASIX) 80 MG tablet Take 1 tablet by mouth 2 (Two) Times a Day. 60 tablet 0   • ipratropium-albuterol (DUO-NEB) 0.5-2.5 mg/3 ml nebulizer Take 3 mL by nebulization Every 4 (Four) Hours. 360 mL 1   • potassium chloride (K-DUR) 10 MEQ CR tablet Take 1 tablet by mouth Daily. 30 tablet 11   • warfarin (COUMADIN) 5 MG tablet Take 1 tablet by mouth Daily. Take one tablet by mouth daily or as directed by med management 30 tablet 3   • [DISCONTINUED] metoprolol succinate XL (TOPROL-XL) 100 MG 24 hr tablet Take 1 tablet by mouth Every 12 (Twelve) Hours. 60 tablet 3     No current facility-administered medications on file prior to visit.          Review of Systems   Constitution: Negative for malaise/fatigue.   Cardiovascular: Negative for chest pain and leg swelling.   Respiratory: Negative for shortness of breath.    Neurological: Negative for light-headedness.   All other systems reviewed and are negative.      OBJECTIVE:   Vital Signs  Vitals:    10/12/18 1104   BP: 134/90   Pulse: 68   SpO2: 98%  "    Estimated body mass index is 21.11 kg/m² as calculated from the following:    Height as of this encounter: 195.6 cm (77\").    Weight as of this encounter: 80.7 kg (178 lb).    Physical Exam   Constitutional: He is oriented to person, place, and time. Vital signs are normal. He appears well-developed and well-nourished.   Eyes: Conjunctivae are normal.   Cardiovascular: Normal rate, regular rhythm and normal heart sounds.    No murmur heard.  Pulmonary/Chest: Effort normal and breath sounds normal. He has no rales.   Abdominal: Normal appearance.   Musculoskeletal: Normal range of motion.   Neurological: He is alert and oriented to person, place, and time. GCS eye subscore is 4. GCS verbal subscore is 5. GCS motor subscore is 6.   Skin: Skin is warm and dry.   Psychiatric: He has a normal mood and affect. His speech is normal and behavior is normal. Judgment and thought content normal. Cognition and memory are normal.         ECG 12 Lead  Date/Time: 10/12/2018 11:29 AM  Performed by: AXEL SANTIAGO  Authorized by: AXEL SANTIAGO   Comparison: compared with previous ECG from 9/28/2018  Comparison to previous ECG: Patient was in atrial flutter on 9/28/18.    Rhythm: sinus rhythm  Rate: normal  ST Segments: ST segments normal  T Waves: T waves normal  QRS axis: normal  Clinical impression: normal ECG            Cardiac Cath:  4/3/2018:  Conclusions:   1. Left main: Normal  2. LAD: Normal  3. LCX: Mild luminal irregularities proximal segment  4. RCA: Normal  5.  Severely diminished left ventricular systolic function.  Ejection fraction 25-30%  6.  1+ mitral valve regurgitation     Recommendations: Last dose of IV diuresis tonight.  By mouth tomorrow.  Increased Toprol.       Cardiac Echo:  10/1/2018:  SHYLA  · Estimated EF appears to be in the range of 21 - 25%. There is left ventricular global hypokinesis noted. The left ventricular cavity is severely dilated. There is no evidence of a left ventricular thrombus " present.  · Right ventricular cavity is dilated. Severely reduced right ventricular systolic function noted.  · Right atrial cavity size is dilated.  · Mild mitral valve regurgitation is present  · Moderate tricuspid valve regurgitation is present.  · Left atrial cavity size is severely dilated. There is light spontaneous echo contrast present in the atrial body and in the atrial appendage. The left atrial appendage was visualized through multiple planes. No evidence of a left atrial appendage thrombus was present. Small patent foramen ovale present with right to left shunting.    5/19/2018:  · Mild mitral valve regurgitation is present  · There is calcification of the aortic valve.  · Moderate to severe tricuspid valve regurgitation is present.  · Mild pulmonic valve regurgitation is present.  · The left ventricular cavity is moderately dilated.  · Right ventricular cavity is moderately dilated.  · Left atrial cavity size is moderately dilated.  · Calculated EF = 30.4%.  · There is no evidence of pericardial effusion.    3/26/2018:  · EF = 35%  · Left ventricular systolic function is moderately decreased.  · The following left ventricular wall segments are hypokinetic: mid anterior, apical anterior, basal anterolateral, mid anterolateral, apical lateral, basal inferolateral, mid inferolateral, apical inferior, mid inferior, apical septal, basal inferoseptal, mid inferoseptal, apex hypokinetic, mid anteroseptal, basal anterior, basal inferior and basal inferoseptal.  · The left ventricular cavity is moderate-to-severely dilated.  · Left ventricular wall thickness is consistent with mild concentric hypertrophy.  · Left ventricular diastolic dysfunction (grade III) consistent with reversible restrictive pattern.  · Right ventricular cavity is mild-to-moderately dilated.  · Left atrial cavity size is moderate-to-severely dilated.  · Moderate-to-severe mitral valve regurgitation is present  · Moderate tricuspid valve  regurgitation is present.  · Estimated right ventricular systolic pressure from tricuspid regurgitation is markedly elevated (>55 mmHg  · Calculated right ventricular systolic pressure from tricuspid regurgitation is 78 mmHg.  · VERNA GONZALES requested I see and evaluate patient; patient was seen      ASSESSMENT:      Diagnosis Plan   1. Paroxysmal atrial fibrillation (CMS/HCC)  Thyroid Panel With TSH    Comprehensive Metabolic Panel   2. Chronic combined systolic (congestive) and diastolic (congestive) heart failure (CMS/HCC)     3. Essential hypertension           PLAN OF CARE:     1.  Paroxysmal atrial fibrillation: Patient states since discharge he feels much improved.  He states that he is no longer having episodes of shortness of breath, cough or fatigue.  He states that his heart rhythm has remained controlled on current medications.  Refill provided for Toprol-XL.  Thyroid panel and CMP ordered to be done in one week since patient has newly been started on amiodarone.  He is managing Coumadin with anticoagulation clinic    2.  Chronic combined heart failure: Patient denies episodes of shortness of breath.  He is doing well with Lasix and metoprolol.    3.  Hypertension: Patient reports blood pressures controlled at home.  Blood pressures did an office today.    4.  Patient to keep previously scheduled appointment with Dr. Mast on November 7, 2018.          Atrial Fibrillation and Atrial Flutter  Assessment  • The patient has paroxysmal atrial fibrillation  • The patient's CHADS2-VASc score is 3  • A [id number] score of 2 or more is considered a high risk for a thromboembolic event  • Warfarin prescribed    Plan  • Attempt to maintain sinus rhythm  • Continue warfarin for antithrombotic therapy, bleeding issues discussed  • Continue amiodarone and beta blocker for rhythm control  • Continue beta blocker for rate control    Subjective - Objective  • The patient underwent cardioversion on 10/1/2018        Heart Failure  Assessment  • ACE inhibitor not prescribed for medical reasons  • Beta blocker prescribed  • Diuretics prescribed  • The most recent ejection fraction is 25%  • Left ventricular function is severely reduced by qualitative assessment  • The left ventricle was last assessed on 10/1/2018    Plan  • The patient has received heart failure education on the following topics: dietary sodium restriction, medication instructions, smoking cessation, minimizing or avoiding NSAID use, symptom management, physical activity, weight monitoring and minimizing alcohol intake  • The heart failure care plan was discussed with the patient today including: continuing the current program    Subjective/Objective    • Physical exam findings negative for rales and peripheral edema.        Thank you for allowing me to participate in the care of your patient,      Sincerely,   JANET Platt  [facility]  10/12/18  11:41 AM

## 2018-10-15 ENCOUNTER — READMISSION MANAGEMENT (OUTPATIENT)
Dept: CALL CENTER | Facility: HOSPITAL | Age: 58
End: 2018-10-15

## 2018-10-15 NOTE — OUTREACH NOTE
CHF Week 2 Survey      Responses   Facility patient discharged from?  [place]   Does the patient have one of the following disease processes/diagnoses(primary or secondary)?  CHF   Week 2 attempt successful?  Yes   Call start time  1657   Call end time  1700   Meds reviewed with patient/caregiver?  Yes   Is the patient taking all medications as directed (includes completed medication regime)?  Yes   Comments regarding appointments  Has seen Dr. Ruiz   Has the patient kept scheduled appointments due by today?  Yes   Comments  nebulizer nad oxygen   What is the patient's perception of their health status since discharge?  Improving   Is the patient weighing daily?  Yes   Is the patient able to teach back Heart Failure Zones?  Yes   CHF Week 2 call completed?  Yes          Lauren Madison RN

## 2018-10-16 LAB
CYTO UR: NORMAL
LAB AP CASE REPORT: NORMAL
PATH REPORT.ADDENDUM SPEC: NORMAL
PATH REPORT.FINAL DX SPEC: NORMAL
PATH REPORT.GROSS SPEC: NORMAL

## 2018-10-17 ENCOUNTER — ANTICOAGULATION VISIT (OUTPATIENT)
Dept: PHARMACY | Facility: HOSPITAL | Age: 58
End: 2018-10-17

## 2018-10-17 DIAGNOSIS — Z79.01 LONG TERM (CURRENT) USE OF ANTICOAGULANTS: ICD-10-CM

## 2018-10-17 LAB
INR PPP: 3.3 (ref 0.91–1.09)
PROTHROMBIN TIME: 39.6 SECONDS (ref 10–13.8)

## 2018-10-17 PROCEDURE — 85610 PROTHROMBIN TIME: CPT

## 2018-10-17 PROCEDURE — 36416 COLLJ CAPILLARY BLOOD SPEC: CPT

## 2018-10-17 PROCEDURE — G0463 HOSPITAL OUTPT CLINIC VISIT: HCPCS

## 2018-10-17 NOTE — PROGRESS NOTES
Anticoagulation Clinic Progress Note    Anticoagulation Summary  As of 10/17/2018    INR goal:   2.0-3.0   TTR:   --   Today's INR:   3.3!   Warfarin maintenance plan:   2.5 mg on Wed; 5 mg all other days   Weekly warfarin total:   32.5 mg   Plan last modified:   Mikaela Soni [facility] (10/17/2018)   Next INR check:   11/7/2018   Priority:   High   Target end date:   Indefinite    Indications    Long term (current) use of anticoagulants [Z79.01]             Anticoagulation Episode Summary     INR check location:       Preferred lab:       Send INR reminders to:   [facility] [place]    Comments:         Anticoagulation Care Providers     Provider Role Specialty Phone number    Amrit Mast MD Referring Cardiology [phone]    Ginger Parish [facility] Responsible Pharmacy           Drug interactions: has remained unchanged.  Diet: has remained unchanged.    Clinic Interview:  Patient Findings     Negatives:   Signs/symptoms of thrombosis, Signs/symptoms of bleeding,   Laboratory test error suspected, Change in health, Change in alcohol use,   Change in activity, Upcoming invasive procedure, Emergency department   visit, Upcoming dental procedure, Missed doses, Extra doses, Change in   medications, Change in diet/appetite, Hospital admission, Bruising, Other   complaints      Clinical Outcomes     Negatives:   Major bleeding event, Thromboembolic event,   Anticoagulation-related hospital admission, Anticoagulation-related ED   visit, Anticoagulation-related fatality        INR History:  Anticoagulation Monitoring 10/10/2018 10/17/2018   INR 3.0 3.3   INR Date 10/10/2018 10/17/2018   INR Goal 2.0-3.0 2.0-3.0   Trend - Down   Last Week Total 0 mg 35 mg   Next Week Total 35 mg 32.5 mg   Sun 5 mg 5 mg   Mon 5 mg 5 mg   Tue 5 mg 5 mg   Wed 5 mg 2.5 mg   Thu 5 mg 5 mg   Fri 5 mg 5 mg   Sat 5 mg 5 mg   Visit Report - -       Plan:  1. INR is Supratherapeutic today- see above in Anticoagulation Summary.  Will  instruct Hernando Caro to Change their warfarin regimen- see above in Anticoagulation Summary.  2. Follow up in 3 weeks. Recommended to return in 2 weeks to recheck INR. Pt stated he preferred to come back in 3 weeks to overlap with cardiology appointment.  3. Patient declines warfarin refills.  4. Verbal and written information provided. Patient expresses understanding and has no further questions at this time.    Bob Oconnor [facility]

## 2018-10-22 ENCOUNTER — READMISSION MANAGEMENT (OUTPATIENT)
Dept: CALL CENTER | Facility: HOSPITAL | Age: 58
End: 2018-10-22

## 2018-10-22 NOTE — OUTREACH NOTE
CHF Week 3 Survey      Responses   Facility patient discharged from?  [facility]   Does the patient have one of the following disease processes/diagnoses(primary or secondary)?  CHF   Week 3 attempt successful?  Yes   Call start time  1721   Call end time  1727   Discharge diagnosis  CHF with Right PE,  AFIB,  SHYLA assisted cardioversion   Is patient permission given to speak with other caregiver?  Yes   List who call center can speak with  SO   Meds reviewed with patient/caregiver?  Yes   Is the patient having any side effects they believe may be caused by any medication additions or changes?  No   Does the patient have all medications ordered at discharge?  Yes   Is the patient taking all medications as directed (includes completed medication regime)?  Yes   Does the patient have a primary care provider?   Yes   Has the patient kept scheduled appointments due by today?  Yes   Psychosocial issues?  No   Did the patient receive a copy of their discharge instructions?  Yes   Nursing interventions  Reviewed instructions with patient   What is the patient's perception of their health status since discharge?  Improving   Nursing interventions  Nurse provided patient education   Is the patient weighing daily?  Yes   Does the patient have scales?  Yes   Daily weight interventions  Education provided on importance of daily weight   Is the patient able to teach back Heart Failure diet management?  Yes   Is the patient able to teach back Heart Failure Zones?  Yes   Is the patient able to teach back signs and symptoms of worsening condition? (i.e. weight gain, shortness of air, etc.)  Yes   Is the patient/caregiver able to teach back the hierarchy of who to call/visit for symptoms/problems? PCP, Specialist, Home health nurse, Urgent Care, ED, 911  Yes   CHF Week 3 call completed?  Yes          Shari Owusu RN

## 2018-10-29 ENCOUNTER — READMISSION MANAGEMENT (OUTPATIENT)
Dept: CALL CENTER | Facility: HOSPITAL | Age: 58
End: 2018-10-29

## 2018-10-29 NOTE — OUTREACH NOTE
CHF Week 4 Survey      Responses   Facility patient discharged from?  [facility]   Does the patient have one of the following disease processes/diagnoses(primary or secondary)?  CHF   Week 4 attempt successful?  No          Jayna Weathers RN

## 2018-11-07 ENCOUNTER — OFFICE VISIT (OUTPATIENT)
Dept: CARDIOLOGY | Facility: CLINIC | Age: 58
End: 2018-11-07

## 2018-11-07 ENCOUNTER — APPOINTMENT (OUTPATIENT)
Dept: PHARMACY | Facility: HOSPITAL | Age: 58
End: 2018-11-07

## 2018-11-07 ENCOUNTER — ANTICOAGULATION VISIT (OUTPATIENT)
Dept: PHARMACY | Facility: HOSPITAL | Age: 58
End: 2018-11-07

## 2018-11-07 VITALS
WEIGHT: 179.2 LBS | DIASTOLIC BLOOD PRESSURE: 80 MMHG | HEIGHT: 77 IN | OXYGEN SATURATION: 99 % | BODY MASS INDEX: 21.16 KG/M2 | HEART RATE: 61 BPM | SYSTOLIC BLOOD PRESSURE: 128 MMHG

## 2018-11-07 DIAGNOSIS — Z79.01 LONG TERM (CURRENT) USE OF ANTICOAGULANTS: ICD-10-CM

## 2018-11-07 DIAGNOSIS — I34.0 MODERATE MITRAL REGURGITATION: ICD-10-CM

## 2018-11-07 DIAGNOSIS — I48.19 PERSISTENT ATRIAL FIBRILLATION (HCC): Primary | ICD-10-CM

## 2018-11-07 DIAGNOSIS — I50.42 CHRONIC COMBINED SYSTOLIC (CONGESTIVE) AND DIASTOLIC (CONGESTIVE) HEART FAILURE (HCC): ICD-10-CM

## 2018-11-07 LAB
INR PPP: 3.1 (ref 0.91–1.09)
PROTHROMBIN TIME: 36.8 SECONDS (ref 10–13.8)

## 2018-11-07 PROCEDURE — 99213 OFFICE O/P EST LOW 20 MIN: CPT | Performed by: INTERNAL MEDICINE

## 2018-11-07 PROCEDURE — 85610 PROTHROMBIN TIME: CPT

## 2018-11-07 PROCEDURE — G0463 HOSPITAL OUTPT CLINIC VISIT: HCPCS

## 2018-11-07 PROCEDURE — 36416 COLLJ CAPILLARY BLOOD SPEC: CPT

## 2018-11-07 NOTE — PROGRESS NOTES
Subjective:     Encounter Date:06/12/2018      Patient ID: Hernando Caro is a 58 y.o. male.    Chief Complaint:  Cardiomyopathy   Associated symptoms include coughing and fatigue. Pertinent negatives include no chest pain.   Congestive Heart Failure   Presents for follow-up visit. Associated symptoms include edema, fatigue and shortness of breath. Pertinent negatives include no chest pain, chest pressure, muscle weakness, palpitations or paroxysmal nocturnal dyspnea. The symptoms have been stable.   Atrial Fibrillation   Presents for follow-up visit. Symptoms include shortness of breath. Symptoms are negative for chest pain and palpitations. The symptoms have been stable. Past medical history includes atrial fibrillation and CHF.   Hypertension   This is a chronic problem. The current episode started more than 1 year ago. The problem is controlled. Associated symptoms include shortness of breath. Pertinent negatives include no chest pain or palpitations.       58-year-old -American male presents today for reevaluation.  Patient continues at about the same [place] heart association class as he has been.  With any exertion he wears out very easily does get short of breath.  He occasionally has some right sided discomfort but his biggest complaint is his overall stamina.  His blood pressure looks good he doesn't seem to be in heart failure today.        Review of Systems   Constitution: Positive for fatigue.   HENT: Positive for nosebleeds.    Cardiovascular: Negative for chest pain and palpitations.   Respiratory: Positive for cough and shortness of breath.    Musculoskeletal: Negative for muscle weakness.   All other systems reviewed and are negative.      Procedures         Objective:     Physical Exam   Constitutional: He is oriented to person, place, and time. He appears well-developed.   HENT:   Head: Normocephalic.   Eyes: Conjunctivae are normal.   Neck: Normal range of motion. No JVD present.    Cardiovascular: Normal rate, regular rhythm and normal heart sounds.    Pulmonary/Chest: Breath sounds normal. He has no rales.   Abdominal: Soft. Bowel sounds are normal. He exhibits no distension.   Musculoskeletal: Normal range of motion. He exhibits no edema.   Neurological: He is alert and oriented to person, place, and time.   Skin: Skin is warm and dry.   Psychiatric: He has a normal mood and affect. His behavior is normal.   Vitals reviewed.      Lab Review:       Assessment:          Diagnosis Plan   1. Persistent atrial fibrillation (CMS/HCC)     2. Chronic combined systolic (congestive) and diastolic (congestive) heart failure (CMS/HCC)     3. Moderate mitral regurgitation            Plan:       1.  Cardiomyopathy.  [place] heart association class II.  I agree that unfortunately he's been unable to return to work.  He has repetitively tried to get back but just cannot handle the physical demands of the job.  2.  Atrial fibrillation.  Patient was cardioverted however symptomatically he has not changed.  3.  Hypertension blood pressures good  4.  Mitral regurgitation moderate clinically doing well  5.  We'll have him reevaluated in 3 months sooner if issues    Atrial Fibrillation and Atrial Flutter  Assessment  • The patient has persistent atrial fibrillation  • This is valvular in etiology  • The patient's CHADS2-VASc score is 3  • A [id number] score of 2 or more is considered a high risk for a thromboembolic event  • Warfarin prescribed    Plan  • Continue in atrial fibrillation with rate control  • Continue warfarin for antithrombotic therapy, bleeding issues discussed  • Continue beta blocker for rate control      Heart Failure  Assessment  • NYHA class III-A - There is limitation of physical activity. The patient is comfortable at rest, but ordinary activity causes fatigue, palpitations or shortness of breath.  • The patient was newly diagnosed with heart failure within the past 12 months  • ACE  inhibitor not prescribed for medical reasons  • Beta blocker prescribed  • Diuretics prescribed  • Angiotensin receptor blocker (ARB) not prescribed for medical reasons  • Left ventricular function is moderately reduced by qualitative assessment    Plan  • The patient has received heart failure education on the following topics: dietary sodium restriction, medication instructions, minimizing or avoiding NSAID use, physical activity and minimizing alcohol intake    Subjective/Objective  • The patient reports dyspnea  • Physical exam findings positive for peripheral edema.   • Physical exam findings negative for rales and elevated JVP.

## 2018-11-07 NOTE — PROGRESS NOTES
2Anticoagulation Clinic Progress Note    Anticoagulation Summary  As of 11/7/2018    INR goal:   2.0-3.0   TTR:   0.0 % (2.6 wk)   Today's INR:   3.1!   Warfarin maintenance plan:   2.5 mg on Wed; 5 mg all other days   Weekly warfarin total:   32.5 mg   No change documented:   Jil Lerner RP   Plan last modified:   Mikaela Soni RP (10/17/2018)   Next INR check:   11/28/2018   Priority:   Maintenance   Target end date:   Indefinite    Indications    Long term (current) use of anticoagulants [Z79.01]             Anticoagulation Episode Summary     INR check location:       Preferred lab:       Send INR reminders to:    [facility]    Comments:         Anticoagulation Care Providers     Provider Role Specialty Phone number    Amrit Mast MD Referring Cardiology [phone]    Ginger Praish [facility]           Drug interactions: has remained unchanged.  Diet: has remained unchanged.    Clinic Interview:  Patient Findings     Negatives:   Signs/symptoms of thrombosis, Signs/symptoms of bleeding,   Laboratory test error suspected, Change in health, Change in alcohol use,   Change in activity, Upcoming invasive procedure, Emergency department   visit, Upcoming dental procedure, Missed doses, Extra doses, Change in   medications, Change in diet/appetite, Hospital admission, Bruising, Other   complaints      Clinical Outcomes     Negatives:   Major bleeding event, Thromboembolic event,   Anticoagulation-related hospital admission, Anticoagulation-related ED   visit, Anticoagulation-related fatality        INR History:  Anticoagulation Monitoring 10/10/2018 10/17/2018 11/7/2018   INR 3.0 3.3 3.1   INR Date 10/10/2018 10/17/2018 11/7/2018   INR Goal 2.0-3.0 2.0-3.0 2.0-3.0   Trend - Down Same   Last Week Total 0 mg 35 mg 32.5 mg   Next Week Total 35 mg 32.5 mg 32.5 mg   Sun 5 mg 5 mg 5 mg   Mon 5 mg 5 mg 5 mg   Tue 5 mg 5 mg 5 mg   Wed 5 mg 2.5 mg 2.5 mg   Thu 5 mg 5 mg 5 mg    Fri 5 mg 5 mg 5 mg   Sat 5 mg 5 mg 5 mg   Visit Report - - -   Some recent data might be hidden       Previous INR data from [facility] is recorded in [facility] and scanned into the patient's chart in [facility]. These results have been analyzed and reviewed.      Plan:  1. INR is Supratherapeutic today- see above in Anticoagulation Summary.  Will instruct Hernando Caro to Continue their warfarin regimen- see above in Anticoagulation Summary.  INR only slightly over goal range and stable.  2. Follow up in 3 weeks  3. Patient declines warfarin refills.  4. Verbal and written information provided. Patient expresses understanding and has no further questions at this time.    Jil Lerner [facility]

## 2018-12-03 ENCOUNTER — ANTICOAGULATION VISIT (OUTPATIENT)
Dept: PHARMACY | Facility: HOSPITAL | Age: 58
End: 2018-12-03

## 2018-12-03 DIAGNOSIS — Z79.01 LONG TERM (CURRENT) USE OF ANTICOAGULANTS: ICD-10-CM

## 2018-12-03 LAB
INR PPP: 2 (ref 0.91–1.09)
PROTHROMBIN TIME: 24.4 SECONDS (ref 10–13.8)

## 2018-12-03 PROCEDURE — 36416 COLLJ CAPILLARY BLOOD SPEC: CPT

## 2018-12-03 PROCEDURE — 85610 PROTHROMBIN TIME: CPT

## 2018-12-03 RX ORDER — AMIODARONE HYDROCHLORIDE 200 MG/1
200 TABLET ORAL
Qty: 90 TABLET | Refills: 1 | OUTPATIENT
Start: 2018-12-03

## 2018-12-03 RX ORDER — AMIODARONE HYDROCHLORIDE 200 MG/1
200 TABLET ORAL
Qty: 30 TABLET | Refills: 1 | Status: SHIPPED | OUTPATIENT
Start: 2018-12-03 | End: 2019-01-25 | Stop reason: SDUPTHER

## 2018-12-03 NOTE — PROGRESS NOTES
Anticoagulation Clinic Progress Note    Anticoagulation Summary  As of 12/3/2018    INR goal:   2.0-3.0   TTR:   52.8 % (1.5 mo)   INR used for dosin.0 (12/3/2018)   Warfarin maintenance plan:   2.5 mg on Wed; 5 mg all other days   Weekly warfarin total:   32.5 mg   No change documented:   Vianney Dowd   Plan last modified:   Mikaela Soni [facility] (10/17/2018)   Next INR check:   2018   Priority:   Maintenance   Target end date:   Indefinite    Indications    Long term (current) use of anticoagulants [Z79.01]             Anticoagulation Episode Summary     INR check location:       Preferred lab:       Send INR reminders to:    UL[facility]SSES [facility]ALVIN [facility]    Comments:         Anticoagulation Care Providers     Provider Role Specialty Phone number    Amrit Mast MD Referring Cardiology [phone]    [facility]           Drug interactions: has remained unchanged.  Diet: has remained unchanged.    Clinic Interview:  Patient Findings     Negatives:   Signs/symptoms of thrombosis, Signs/symptoms of bleeding,   Laboratory test error suspected, Change in health, Change in alcohol use,   Change in activity, Upcoming invasive procedure, Emergency department   visit, Upcoming dental procedure, Missed doses, Extra doses, Change in   medications, Change in diet/appetite, Hospital admission, Bruising, Other   complaints      Clinical Outcomes     Negatives:   Major bleeding event, Thromboembolic event,   Anticoagulation-related hospital admission, Anticoagulation-related ED   visit, Anticoagulation-related fatality        INR History:  Anticoagulation Monitoring 10/17/2018 2018 12/3/2018   INR 3.3 3.1 2.0   INR Date 10/17/2018 2018 12/3/2018   INR Goal 2.0-3.0 2.0-3.0 2.0-3.0   Trend Down Same Same   Last Week Total 35 mg 32.5 mg 32.5 mg   Next Week Total 32.5 mg 32.5 mg 32.5 mg   Sun 5 mg 5 mg 5 mg   Mon 5 mg 5 mg 5 mg   Tue 5 mg 5 mg 5 mg   Wed 2.5 mg 2.5 mg  2.5 mg   Thu 5 mg 5 mg 5 mg   Fri 5 mg 5 mg 5 mg   Sat 5 mg 5 mg 5 mg   Visit Report - - -   Some recent data might be hidden       Plan:  1. INR is therapeutic today- see above in Anticoagulation Summary.   Will instruct Hernando Caro to continue their warfarin regimen- see above in Anticoagulation Summary.  2. Follow up in 4 weeks.  3. Patient declines warfarin refills.  4. Verbal and written information provided. Patient expresses understanding and has no further questions at this time.    Vianney Dowd

## 2018-12-10 RX ORDER — ATORVASTATIN CALCIUM 10 MG/1
10 TABLET, FILM COATED ORAL NIGHTLY
Qty: 30 TABLET | Refills: 0 | Status: SHIPPED | OUTPATIENT
Start: 2018-12-10 | End: 2018-12-19 | Stop reason: SDUPTHER

## 2018-12-19 ENCOUNTER — NURSE TRIAGE (OUTPATIENT)
Dept: CALL CENTER | Facility: HOSPITAL | Age: 58
End: 2018-12-19

## 2018-12-19 RX ORDER — ATORVASTATIN CALCIUM 10 MG/1
10 TABLET, FILM COATED ORAL NIGHTLY
Qty: 30 TABLET | Refills: 0 | Status: SHIPPED | OUTPATIENT
Start: 2018-12-19 | End: 2019-02-20 | Stop reason: ALTCHOICE

## 2018-12-19 RX ORDER — FUROSEMIDE 80 MG
80 TABLET ORAL 2 TIMES DAILY
Qty: 60 TABLET | Refills: 0 | Status: SHIPPED | OUTPATIENT
Start: 2018-12-19 | End: 2018-12-19 | Stop reason: SDUPTHER

## 2018-12-19 NOTE — TELEPHONE ENCOUNTER
"He is out of his medications he has called the cardiology group several times. He has not seen them for an appt. Explained that he would need to call the office and explain that he has an office appt.soon and he is out of his medication.  That some places will not refill without a office visit. He has a PCP has been unable to see because he does not have the money.  He was in the hospital on 09/28/2019.     Reason for Disposition  • Caller has URGENT medication question about med that PCP prescribed and triager unable to answer question    Additional Information  • Negative: Drug overdose and nurse unable to answer question  • Negative: Caller requesting information not related to medicine  • Negative: Caller requesting a prescription for Strep throat and has a positive culture result  • Negative: Rash while taking a medication or within 3 days of stopping it  • Negative: Immunization reaction suspected  • Negative: [1] Asthma and [2] having symptoms of asthma (cough, wheezing, etc)  • Negative: MORE THAN A DOUBLE DOSE of a prescription or over-the-counter (OTC) drug  • Negative: [1] DOUBLE DOSE (an extra dose or lesser amount) of over-the-counter (OTC) drug AND [2] any symptoms (e.g., dizziness, nausea, pain, sleepiness)  • Negative: [1] DOUBLE DOSE (an extra dose or lesser amount) of prescription drug AND [2] any symptoms (e.g., dizziness, nausea, pain, sleepiness)  • Negative: Took another person's prescription drug  • Negative: [1] DOUBLE DOSE (an extra dose or lesser amount) of prescription drug AND [2] NO symptoms (Exception: a double dose of antibiotics)  • Negative: Diabetes drug error or overdose (e.g., insulin or extra dose)  • Negative: [1] Request for URGENT new prescription or refill of \"essential\" medication (i.e., likelihood of harm to patient if not taken) AND [2] triager unable to fill per unit policy  • Negative: [1] Prescription not at pharmacy AND [2] was prescribed today by PCP  • Negative: " "Pharmacy calling with prescription questions and triager unable to answer question  • Negative: Caller has NON-URGENT medication question about med that PCP prescribed and triager unable to answer question    Answer Assessment - Initial Assessment Questions  1. SYMPTOMS: \"Do you have any symptoms?\"     He is out of his medications and he has not seen his PCP or Cardiologist.   2. SEVERITY: If symptoms are present, ask \"Are they mild, moderate or severe?\"      none    Protocols used: MEDICATION QUESTION CALL-ADULT-      "

## 2018-12-20 RX ORDER — FUROSEMIDE 80 MG
TABLET ORAL
Qty: 180 TABLET | Refills: 0 | Status: SHIPPED | OUTPATIENT
Start: 2018-12-20 | End: 2019-02-20 | Stop reason: ALTCHOICE

## 2019-01-07 RX ORDER — ATORVASTATIN CALCIUM 10 MG/1
10 TABLET, FILM COATED ORAL NIGHTLY
Qty: 30 TABLET | Refills: 1 | Status: SHIPPED | OUTPATIENT
Start: 2019-01-07 | End: 2019-03-20 | Stop reason: SDUPTHER

## 2019-01-16 ENCOUNTER — TELEPHONE (OUTPATIENT)
Dept: PHARMACY | Facility: HOSPITAL | Age: 59
End: 2019-01-16

## 2019-01-23 RX ORDER — FUROSEMIDE 80 MG
TABLET ORAL
Qty: 60 TABLET | Refills: 0 | OUTPATIENT
Start: 2019-01-23

## 2019-01-28 RX ORDER — FUROSEMIDE 80 MG
TABLET ORAL
Qty: 60 TABLET | Refills: 0 | Status: SHIPPED | OUTPATIENT
Start: 2019-01-28 | End: 2019-02-21 | Stop reason: SDUPTHER

## 2019-01-28 RX ORDER — AMIODARONE HYDROCHLORIDE 200 MG/1
200 TABLET ORAL
Qty: 30 TABLET | Refills: 0 | Status: SHIPPED | OUTPATIENT
Start: 2019-01-28 | End: 2019-02-21 | Stop reason: SDUPTHER

## 2019-01-30 ENCOUNTER — APPOINTMENT (OUTPATIENT)
Dept: PHARMACY | Facility: HOSPITAL | Age: 59
End: 2019-01-30

## 2019-02-07 ENCOUNTER — TELEPHONE (OUTPATIENT)
Dept: PHARMACY | Facility: HOSPITAL | Age: 59
End: 2019-02-07

## 2019-02-11 ENCOUNTER — ANTICOAGULATION VISIT (OUTPATIENT)
Dept: PHARMACY | Facility: HOSPITAL | Age: 59
End: 2019-02-11

## 2019-02-11 DIAGNOSIS — Z79.01 LONG TERM (CURRENT) USE OF ANTICOAGULANTS: ICD-10-CM

## 2019-02-11 LAB
INR PPP: 4.8 (ref 0.91–1.09)
PROTHROMBIN TIME: 57.6 SECONDS (ref 10–13.8)

## 2019-02-11 PROCEDURE — 36416 COLLJ CAPILLARY BLOOD SPEC: CPT

## 2019-02-11 PROCEDURE — G0463 HOSPITAL OUTPT CLINIC VISIT: HCPCS

## 2019-02-11 PROCEDURE — 85610 PROTHROMBIN TIME: CPT

## 2019-02-11 RX ORDER — WARFARIN SODIUM 5 MG/1
TABLET ORAL
Qty: 90 TABLET | Refills: 0 | Status: SHIPPED | OUTPATIENT
Start: 2019-02-11 | End: 2019-05-15 | Stop reason: SDUPTHER

## 2019-02-11 NOTE — PROGRESS NOTES
Anticoagulation Clinic Progress Note    Anticoagulation Summary  As of 2019    INR goal:   2.0-3.0   TTR:   42.3 % (3.8 mo)   INR used for dosin.8! (2019)   Warfarin maintenance plan:   2.5 mg on Mon, Wed, Fri; 5 mg all other days   Weekly warfarin total:   27.5 mg   Plan last modified:   Bob Oconnor RPH (2019)   Next INR check:   2019   Priority:   Maintenance   Target end date:   Indefinite    Indications    Long term (current) use of anticoagulants [Z79.01]             Anticoagulation Episode Summary     INR check location:       Preferred lab:       Send INR reminders to:    [facility] GALVIN [facility]INICAL POOL    Comments:         Anticoagulation Care Providers     Provider Role Specialty Phone number    Amrit Mast MD Referring Cardiology [phone]          Clinic Interview:  Patient Findings     Positives:   Extra doses    Negatives:   Signs/symptoms of thrombosis, Signs/symptoms of bleeding,   Laboratory test error suspected, Change in health, Change in alcohol use,   Change in activity, Upcoming invasive procedure, Emergency department   visit, Upcoming dental procedure, Missed doses, Change in medications,   Change in diet/appetite, Hospital admission, Bruising, Other complaints    Comments:   Pt states he thinks he has been doing 5mg daily instead of   2.5mg on Wed (says he is having some memory issues but now does have a   pill box to help)      Clinical Outcomes     Negatives:   Major bleeding event, Thromboembolic event,   Anticoagulation-related hospital admission, Anticoagulation-related ED   visit, Anticoagulation-related fatality    Comments:   Pt states he thinks he has been doing 5mg daily instead of   2.5mg on Wed (says he is having some memory issues but now does have a   pill box to help)        INR History:  Anticoagulation Monitoring 2018 12/3/2018 2019   INR 3.1 2.0 4.8   INR Date 2018 12/3/2018 2019   INR Goal 2.0-3.0 2.0-3.0 2.0-3.0    Trend Same Same Down   Last Week Total 32.5 mg 32.5 mg 32.5 mg   Next Week Total 32.5 mg 32.5 mg 25 mg   Sun 5 mg 5 mg 5 mg   Mon 5 mg 5 mg 5 mg (2/11)   Tue 5 mg 5 mg Hold (2/12)   Wed 2.5 mg 2.5 mg 2.5 mg   Thu 5 mg 5 mg 5 mg   Fri 5 mg 5 mg 2.5 mg   Sat 5 mg 5 mg 5 mg   Visit Report - - -   Some recent data might be hidden     Patient had INR reading >4.5.  Medication Management [facility] recommended for patient to have a lab draw to confirm the readings; however, patient refused.  Patient was educated about the variability of the test strip readings at values >4.5 and was also educated to monitor for any signs excessive bleeding (including in the urine, stool, emesis, etc.).  Patient expressed understanding.  Patient denied any current signs of bleeding.    Plan:  1. INR is Supratherapeutic today- see above in Anticoagulation Summary.  Will instruct Hernando Caro to Change their warfarin regimen- see above in Anticoagulation Summary.  2. Follow up in 1 week  3. Patient desires warfarin refills.  4. Verbal and written information provided. Patient expresses understanding and has no further questions at this time.    Bob Oconnor [facility]

## 2019-02-18 ENCOUNTER — ANTICOAGULATION VISIT (OUTPATIENT)
Dept: PHARMACY | Facility: HOSPITAL | Age: 59
End: 2019-02-18

## 2019-02-18 DIAGNOSIS — Z79.01 LONG TERM (CURRENT) USE OF ANTICOAGULANTS: ICD-10-CM

## 2019-02-18 LAB
INR PPP: 3.5 (ref 0.91–1.09)
PROTHROMBIN TIME: 41.9 SECONDS (ref 10–13.8)

## 2019-02-18 PROCEDURE — 85610 PROTHROMBIN TIME: CPT

## 2019-02-18 PROCEDURE — 36416 COLLJ CAPILLARY BLOOD SPEC: CPT

## 2019-02-18 PROCEDURE — G0463 HOSPITAL OUTPT CLINIC VISIT: HCPCS

## 2019-02-18 NOTE — PROGRESS NOTES
Anticoagulation Clinic Progress Note    Anticoagulation Summary  As of 2/18/2019    INR goal:   2.0-3.0   TTR:   39.9 % (4 mo)   INR used for dosing:   3.5! (2/18/2019)   Warfarin maintenance plan:   5 mg on Sun, Tue, Thu; 2.5 mg all other days   Weekly warfarin total:   25 mg   Plan last modified:   Bob Oconnor RP (2/18/2019)   Next INR check:   3/4/2019   Priority:   Maintenance   Target end date:   Indefinite    Indications    Long term (current) use of anticoagulants [Z79.01]             Anticoagulation Episode Summary     INR check location:       Preferred lab:       Send INR reminders to:    [facility]INICAL POOL    Comments:         Anticoagulation Care Providers     Provider Role Specialty Phone number    Amrit Mast MD Referring Cardiology [phone]          Clinic Interview:  Patient Findings     Negatives:   Signs/symptoms of thrombosis, Signs/symptoms of bleeding,   Laboratory test error suspected, Change in health, Change in alcohol use,   Change in activity, Upcoming invasive procedure, Emergency department   visit, Upcoming dental procedure, Missed doses, Extra doses, Change in   medications, Change in diet/appetite, Hospital admission, Bruising, Other   complaints      Clinical Outcomes     Negatives:   Major bleeding event, Thromboembolic event,   Anticoagulation-related hospital admission, Anticoagulation-related ED   visit, Anticoagulation-related fatality        INR History:  Anticoagulation Monitoring 12/3/2018 2/11/2019 2/18/2019   INR 2.0 4.8 3.5   INR Date 12/3/2018 2/11/2019 2/18/2019   INR Goal 2.0-3.0 2.0-3.0 2.0-3.0   Trend Same Down Down   Last Week Total 32.5 mg 32.5 mg 25 mg   Next Week Total 32.5 mg 25 mg 25 mg   Sun 5 mg 5 mg 5 mg   Mon 5 mg 5 mg (2/11) 2.5 mg   Tue 5 mg Hold (2/12) 5 mg   Wed 2.5 mg 2.5 mg 2.5 mg   Thu 5 mg 5 mg 5 mg   Fri 5 mg 2.5 mg 2.5 mg   Sat 5 mg 5 mg 2.5 mg   Visit Report - - -   Some recent data might be hidden       Plan:  1. INR is  out of range- see above in Anticoagulation Summary.   Will instruct Hernando Caro to Change  their warfarin regimen- see above in Anticoagulation Summary.  2. Follow up in 2 weeks.   3. Patient declines warfarin refills.  4. Verbal and written information provided. Patient expresses understanding and has no further questions at this time.    Diana Hui

## 2019-02-20 ENCOUNTER — OFFICE VISIT (OUTPATIENT)
Dept: CARDIOLOGY | Facility: CLINIC | Age: 59
End: 2019-02-20

## 2019-02-20 VITALS
WEIGHT: 199 LBS | DIASTOLIC BLOOD PRESSURE: 98 MMHG | SYSTOLIC BLOOD PRESSURE: 140 MMHG | BODY MASS INDEX: 23.5 KG/M2 | HEART RATE: 65 BPM | HEIGHT: 77 IN

## 2019-02-20 DIAGNOSIS — I50.42 CHRONIC COMBINED SYSTOLIC (CONGESTIVE) AND DIASTOLIC (CONGESTIVE) HEART FAILURE (HCC): ICD-10-CM

## 2019-02-20 DIAGNOSIS — I10 ESSENTIAL HYPERTENSION: ICD-10-CM

## 2019-02-20 DIAGNOSIS — I34.0 MODERATE MITRAL REGURGITATION: Primary | ICD-10-CM

## 2019-02-20 PROCEDURE — 93000 ELECTROCARDIOGRAM COMPLETE: CPT | Performed by: INTERNAL MEDICINE

## 2019-02-20 PROCEDURE — 99213 OFFICE O/P EST LOW 20 MIN: CPT | Performed by: INTERNAL MEDICINE

## 2019-02-21 NOTE — PROGRESS NOTES
Subjective:     Encounter Date:06/12/2018      Patient ID: Hernando Caro is a 58 y.o. male.    Chief Complaint:  Cardiomyopathy   Associated symptoms include coughing and fatigue. Pertinent negatives include no chest pain.   Congestive Heart Failure   Presents for follow-up visit. Associated symptoms include edema, fatigue and shortness of breath. Pertinent negatives include no chest pain, chest pressure, muscle weakness, palpitations or paroxysmal nocturnal dyspnea. The symptoms have been stable.   Atrial Fibrillation   Presents for follow-up visit. Symptoms include shortness of breath. Symptoms are negative for chest pain and palpitations. The symptoms have been stable. Past medical history includes atrial fibrillation and CHF.   Hypertension   This is a chronic problem. The current episode started more than 1 year ago. The problem is controlled. Associated symptoms include shortness of breath. Pertinent negatives include no chest pain or palpitations.       58-year-old -American male presents today with a known cardiomyopathy.  He is on disability he is doing significantly better.  He is probably a [place] heart association class I based on his description of his activities.  Patient denies chest pain shortness of breath palpitations lightheadedness swelling and fatigue      Review of Systems   Constitution: Positive for fatigue.   HENT: Positive for nosebleeds.    Cardiovascular: Negative for chest pain and palpitations.   Respiratory: Positive for cough and shortness of breath.    Musculoskeletal: Negative for muscle weakness.   All other systems reviewed and are negative.        ECG 12 Lead  Date/Time: 2/20/2019 1:13 PM  Performed by: Amrit Mast MD  Authorized by: Amrit Mast MD   Comparison: compared with previous ECG from 10/12/2018  Similar to previous ECG  Rhythm: sinus rhythm  Other findings: left ventricular hypertrophy with strain    Clinical impression: abnormal  EKG               Objective:     Physical Exam   Constitutional: He is oriented to person, place, and time. He appears well-developed.   HENT:   Head: Normocephalic.   Eyes: Conjunctivae are normal.   Neck: Normal range of motion. No JVD present.   Cardiovascular: Normal rate, regular rhythm and normal heart sounds.   Pulmonary/Chest: Breath sounds normal. He has no rales.   Abdominal: Soft. Bowel sounds are normal. He exhibits no distension.   Musculoskeletal: Normal range of motion. He exhibits no edema.   Neurological: He is alert and oriented to person, place, and time.   Skin: Skin is warm and dry.   Psychiatric: He has a normal mood and affect. His behavior is normal.   Vitals reviewed.      Lab Review:       Assessment:          Diagnosis Plan   1. Moderate mitral regurgitation     2. Chronic combined systolic (congestive) and diastolic (congestive) heart failure (CMS/HCC)     3. Essential hypertension            Plan:       1.  Cardiomyopathy.  [place] heart association class II.  I agree that unfortunately he's been unable to return to work.  He has repetitively tried to get back but just cannot handle the physical demands of the job.  2.  Atrial fibrillation.  Patient was cardioverted however symptomatically he has not changed.  3.  Hypertension blood pressures good  4.  Mitral regurgitation moderate clinically doing well  5.  We'll have him reevaluated in 6 months sooner if issues    Atrial Fibrillation and Atrial Flutter  Assessment  • The patient has persistent atrial fibrillation  • This is valvular in etiology  • The patient's CHADS2-VASc score is 3  • A [id number] score of 2 or more is considered a high risk for a thromboembolic event  • Warfarin prescribed    Plan  • Continue in atrial fibrillation with rate control  • Continue warfarin for antithrombotic therapy, bleeding issues discussed  • Continue beta blocker for rate control      Heart Failure  Assessment  • NYHA class III-A - There is  limitation of physical activity. The patient is comfortable at rest, but ordinary activity causes fatigue, palpitations or shortness of breath.  • The patient was newly diagnosed with heart failure within the past 12 months  • ACE inhibitor not prescribed for medical reasons  • Beta blocker prescribed  • Diuretics prescribed  • Angiotensin receptor blocker (ARB) not prescribed for medical reasons  • Left ventricular function is moderately reduced by qualitative assessment    Plan  • The patient has received heart failure education on the following topics: dietary sodium restriction, medication instructions, minimizing or avoiding NSAID use, physical activity and minimizing alcohol intake    Subjective/Objective  • The patient reports dyspnea  • Physical exam findings positive for peripheral edema.   • Physical exam findings negative for rales and elevated JVP.

## 2019-02-22 RX ORDER — FUROSEMIDE 80 MG
TABLET ORAL
Qty: 60 TABLET | Refills: 6 | Status: SHIPPED | OUTPATIENT
Start: 2019-02-22

## 2019-02-22 RX ORDER — AMIODARONE HYDROCHLORIDE 200 MG/1
200 TABLET ORAL
Qty: 30 TABLET | Refills: 6 | Status: SHIPPED | OUTPATIENT
Start: 2019-02-22

## 2019-03-04 ENCOUNTER — APPOINTMENT (OUTPATIENT)
Dept: LAB | Facility: HOSPITAL | Age: 59
End: 2019-03-04

## 2019-03-04 ENCOUNTER — ANTICOAGULATION VISIT (OUTPATIENT)
Dept: PHARMACY | Facility: HOSPITAL | Age: 59
End: 2019-03-04

## 2019-03-04 DIAGNOSIS — Z79.01 LONG TERM (CURRENT) USE OF ANTICOAGULANTS: ICD-10-CM

## 2019-03-04 LAB
INR PPP: 4.43 (ref 0.9–1.1)
INR PPP: 5.4 (ref 0.91–1.09)
PROTHROMBIN TIME: 41.5 SECONDS (ref 11.7–14.2)
PROTHROMBIN TIME: 64.6 SECONDS (ref 10–13.8)

## 2019-03-04 PROCEDURE — 36415 COLL VENOUS BLD VENIPUNCTURE: CPT

## 2019-03-04 PROCEDURE — 85610 PROTHROMBIN TIME: CPT

## 2019-03-04 PROCEDURE — G0463 HOSPITAL OUTPT CLINIC VISIT: HCPCS

## 2019-03-04 PROCEDURE — 36416 COLLJ CAPILLARY BLOOD SPEC: CPT

## 2019-03-11 ENCOUNTER — APPOINTMENT (OUTPATIENT)
Dept: PHARMACY | Facility: HOSPITAL | Age: 59
End: 2019-03-11

## 2019-03-12 ENCOUNTER — TELEPHONE (OUTPATIENT)
Dept: PHARMACY | Facility: HOSPITAL | Age: 59
End: 2019-03-12

## 2019-03-20 RX ORDER — ATORVASTATIN CALCIUM 10 MG/1
10 TABLET, FILM COATED ORAL NIGHTLY
Qty: 30 TABLET | Refills: 0 | Status: SHIPPED | OUTPATIENT
Start: 2019-03-20 | End: 2019-05-02 | Stop reason: SDUPTHER

## 2019-03-25 ENCOUNTER — ANTICOAGULATION VISIT (OUTPATIENT)
Dept: PHARMACY | Facility: HOSPITAL | Age: 59
End: 2019-03-25

## 2019-03-25 DIAGNOSIS — Z79.01 LONG TERM (CURRENT) USE OF ANTICOAGULANTS: ICD-10-CM

## 2019-03-25 LAB
INR PPP: 2.5 (ref 0.91–1.09)
PROTHROMBIN TIME: 29.7 SECONDS (ref 10–13.8)

## 2019-03-25 PROCEDURE — 36416 COLLJ CAPILLARY BLOOD SPEC: CPT

## 2019-03-25 PROCEDURE — 85610 PROTHROMBIN TIME: CPT

## 2019-03-25 NOTE — PROGRESS NOTES
Anticoagulation Clinic Progress Note    Anticoagulation Summary  As of 3/25/2019    INR goal:   2.0-3.0   TTR:   33.3 % (5.2 mo)   INR used for dosin.5 (3/25/2019)   Warfarin maintenance plan:   5 mg every Sun, Tue, Thu; 2.5 mg all other days   Weekly warfarin total:   25 mg   No change documented:   Vianney Dowd   Plan last modified:   Bob Oconnor [facility] (2019)   Next INR check:   2019   Priority:   High   Target end date:   Indefinite    Indications    Long term (current) use of anticoagulants [Z79.01]             Anticoagulation Episode Summary     INR check location:       Preferred lab:       Send INR reminders to:   [facility]    Comments:         Anticoagulation Care Providers     Provider Role Specialty Phone number    Amrit Mast MD Referring Cardiology [phone]          Clinic Interview:  Patient Findings     Negatives:   Signs/symptoms of thrombosis, Signs/symptoms of bleeding,   Laboratory test error suspected, Change in health, Change in alcohol use,   Change in activity, Upcoming invasive procedure, Emergency department   visit, Upcoming dental procedure, Missed doses, Extra doses, Change in   medications, Change in diet/appetite, Hospital admission, Bruising, Other   complaints      Clinical Outcomes     Negatives:   Major bleeding event, Thromboembolic event,   Anticoagulation-related hospital admission, Anticoagulation-related ED   visit, Anticoagulation-related fatality        INR History:  Anticoagulation Monitoring 2019 3/4/2019 3/25/2019   INR 3.5 4.43 2.5   INR Date 2019 3/4/2019 3/25/2019   INR Goal 2.0-3.0 2.0-3.0 2.0-3.0   Trend Down Same Same   Last Week Total 25 mg 30 mg 25 mg   Next Week Total 25 mg 17.5 mg 25 mg   Sun 5 mg 5 mg 5 mg   Mon 2.5 mg Hold (3/4) 2.5 mg   Tue 5 mg Hold (3/5) 5 mg   Wed 2.5 mg 2.5 mg 2.5 mg   Thu 5 mg 5 mg 5 mg   Fri 2.5 mg 2.5 mg 2.5 mg   Sat 2.5 mg 2.5 mg 2.5 mg   Visit Report - - -   Some recent  data might be hidden       Plan:  1. INR is therapeutic today- see above in Anticoagulation Summary.   Will instruct Hernando Caro to continue their warfarin regimen- see above in Anticoagulation Summary.  2. Follow up in 3 weeks.  3. Patient declines warfarin refills.  4. Verbal and written information provided. Patient expresses understanding and has no further questions at this time.    Vianney Dowd

## 2019-04-08 RX ORDER — METOPROLOL SUCCINATE 100 MG/1
TABLET, EXTENDED RELEASE ORAL
Qty: 60 TABLET | Refills: 4 | Status: SHIPPED | OUTPATIENT
Start: 2019-04-08

## 2019-04-29 ENCOUNTER — ANTICOAGULATION VISIT (OUTPATIENT)
Dept: PHARMACY | Facility: HOSPITAL | Age: 59
End: 2019-04-29

## 2019-04-29 DIAGNOSIS — Z79.01 LONG TERM (CURRENT) USE OF ANTICOAGULANTS: ICD-10-CM

## 2019-04-29 LAB
INR PPP: 2.8 (ref 0.91–1.09)
PROTHROMBIN TIME: 33.5 SECONDS (ref 10–13.8)

## 2019-04-29 PROCEDURE — 36416 COLLJ CAPILLARY BLOOD SPEC: CPT

## 2019-04-29 PROCEDURE — 85610 PROTHROMBIN TIME: CPT

## 2019-04-29 NOTE — PROGRESS NOTES
Anticoagulation Clinic Progress Note    Anticoagulation Summary  As of 2019    INR goal:   2.0-3.0   TTR:   45.4 % (6.4 mo)   INR used for dosin.8 (2019)   Warfarin maintenance plan:   5 mg every Sun, Tue, Thu; 2.5 mg all other days   Weekly warfarin total:   25 mg   No change documented:   Maria E Russell   Plan last modified:   Bob Oconnor RPH (2019)   Next INR check:   2019   Priority:   High   Target end date:   Indefinite    Indications    Long term (current) use of anticoagulants [Z79.01]             Anticoagulation Episode Summary     INR check location:       Preferred lab:       Send INR reminders to:   [facility]    Comments:         Anticoagulation Care Providers     Provider Role Specialty Phone number    Amrit Mast MD Referring Cardiology [phone]          Clinic Interview:  Patient Findings     Negatives:   Signs/symptoms of thrombosis, Signs/symptoms of bleeding,   Laboratory test error suspected, Change in health, Change in alcohol use,   Change in activity, Upcoming invasive procedure, Emergency department   visit, Upcoming dental procedure, Missed doses, Extra doses, Change in   medications, Change in diet/appetite, Hospital admission, Bruising, Other   complaints      Clinical Outcomes     Negatives:   Major bleeding event, Thromboembolic event,   Anticoagulation-related hospital admission, Anticoagulation-related ED   visit, Anticoagulation-related fatality        INR History:  Anticoagulation Monitoring 3/4/2019 3/25/2019 2019   INR 4.43 2.5 2.8   INR Date 3/4/2019 3/25/2019 2019   INR Goal 2.0-3.0 2.0-3.0 2.0-3.0   Trend Same Same Same   Last Week Total 30 mg 25 mg 25 mg   Next Week Total 17.5 mg 25 mg 25 mg   Sun 5 mg 5 mg 5 mg   Mon Hold (3/4) 2.5 mg 2.5 mg   Tue Hold (3/5) 5 mg 5 mg   Wed 2.5 mg 2.5 mg 2.5 mg   Thu 5 mg 5 mg 5 mg   Fri 2.5 mg 2.5 mg 2.5 mg   Sat 2.5 mg 2.5 mg 2.5 mg   Visit Report - - -   Some recent  data might be hidden       Plan:  1. INR is therapeutic today- see above in Anticoagulation Summary.   Will instruct Hernando Caro to continue their warfarin regimen- see above in Anticoagulation Summary.  2. Follow up in 4 weeks.  3. Patient declines warfarin refills.  4. Verbal and written information provided. Patient expresses understanding and has no further questions at this time.    Maria E Russell

## 2019-05-02 RX ORDER — ATORVASTATIN CALCIUM 10 MG/1
10 TABLET, FILM COATED ORAL NIGHTLY
Qty: 30 TABLET | Refills: 0 | Status: SHIPPED | OUTPATIENT
Start: 2019-05-02 | End: 2019-06-30 | Stop reason: SDUPTHER

## 2019-05-15 RX ORDER — WARFARIN SODIUM 5 MG/1
TABLET ORAL
Qty: 90 TABLET | Refills: 0 | Status: SHIPPED | OUTPATIENT
Start: 2019-05-15 | End: 2019-08-20 | Stop reason: SDUPTHER

## 2019-05-17 ENCOUNTER — TELEPHONE (OUTPATIENT)
Dept: CARDIOLOGY | Facility: CLINIC | Age: 59
End: 2019-05-17

## 2019-05-17 NOTE — TELEPHONE ENCOUNTER
Pt needs to have 2 teeth extracted at the same time -and then will need to come back for 4 separate  deep gum cleanings.  How long can pt hold warfarin ?    Thanks,  Mariela

## 2019-05-28 ENCOUNTER — APPOINTMENT (OUTPATIENT)
Dept: PHARMACY | Facility: HOSPITAL | Age: 59
End: 2019-05-28

## 2019-06-03 RX ORDER — POTASSIUM CHLORIDE 750 MG/1
10 TABLET, FILM COATED, EXTENDED RELEASE ORAL DAILY
Qty: 30 TABLET | Refills: 2 | Status: SHIPPED | OUTPATIENT
Start: 2019-06-03

## 2019-06-12 ENCOUNTER — TELEPHONE (OUTPATIENT)
Dept: PHARMACY | Facility: HOSPITAL | Age: 59
End: 2019-06-12

## 2019-06-19 ENCOUNTER — NURSE TRIAGE (OUTPATIENT)
Dept: CALL CENTER | Facility: HOSPITAL | Age: 59
End: 2019-06-19

## 2019-06-19 NOTE — TELEPHONE ENCOUNTER
"Caller has not had recent hospital admission but has been getting services at [facility]. Gave him the phone number to case management to call tomorrow. They can advise him of community services available to him and what he needs to do to apply for medicaid etc.  [phone].     Reason for Disposition  • General information question, no triage required and triager able to answer question    Additional Information  • Negative: [1] Caller is not with the adult (patient) AND [2] reporting urgent symptoms  • Negative: Lab result questions  • Negative: Medication questions  • Negative: Caller cannot be reached by phone  • Negative: Caller has already spoken to PCP or another triager  • Negative: RN needs further essential information from caller in order to complete triage  • Negative: Requesting regular office appointment  • Negative: [1] Caller requesting NON-URGENT health information AND [2] PCP's office is the best resource  • Negative: Health Information question, no triage required and triager able to answer question    Answer Assessment - Initial Assessment Questions  1. REASON FOR CALL or QUESTION: \"What is your reason for calling today?\" or \"How can I best help you?\" or \"What question do you have that I can help answer?\"      Needs referral to community services related to disability    Protocols used: INFORMATION ONLY CALL-ADULT-      "

## 2019-06-24 ENCOUNTER — TELEPHONE (OUTPATIENT)
Dept: PHARMACY | Facility: HOSPITAL | Age: 59
End: 2019-06-24

## 2019-07-01 RX ORDER — ATORVASTATIN CALCIUM 10 MG/1
10 TABLET, FILM COATED ORAL NIGHTLY
Qty: 30 TABLET | Refills: 0 | Status: SHIPPED | OUTPATIENT
Start: 2019-07-01 | End: 2019-07-30 | Stop reason: SDUPTHER

## 2019-07-03 ENCOUNTER — ANTICOAGULATION VISIT (OUTPATIENT)
Dept: PHARMACY | Facility: HOSPITAL | Age: 59
End: 2019-07-03

## 2019-07-03 DIAGNOSIS — Z79.01 LONG TERM (CURRENT) USE OF ANTICOAGULANTS: ICD-10-CM

## 2019-07-03 LAB
INR PPP: 4.8 (ref 0.91–1.09)
PROTHROMBIN TIME: 57.1 SECONDS (ref 10–13.8)

## 2019-07-03 PROCEDURE — G0463 HOSPITAL OUTPT CLINIC VISIT: HCPCS

## 2019-07-03 PROCEDURE — 36416 COLLJ CAPILLARY BLOOD SPEC: CPT

## 2019-07-03 PROCEDURE — 85610 PROTHROMBIN TIME: CPT

## 2019-07-03 NOTE — PROGRESS NOTES
Anticoagulation Clinic Progress Note    Anticoagulation Summary  As of 7/3/2019    INR goal:   2.0-3.0   TTR:   36.4 % (8.5 mo)   INR used for dosin.8! (7/3/2019)   Warfarin maintenance plan:   5 mg every Sun, e, Thu; 2.5 mg all other days   Weekly warfarin total:   25 mg   Plan last modified:   Bob Oconnor RPH (2019)   Next INR check:   7/10/2019   Priority:   High   Target end date:   Indefinite    Indications    Long term (current) use of anticoagulants [Z79.01]             Anticoagulation Episode Summary     INR check location:       Preferred lab:       Send INR reminders to:    [facility]    Comments:         Anticoagulation Care Providers     Provider Role Specialty Phone number    Amrit Mast MD Referring Cardiology [phone]          Clinic Interview:  Patient Findings     Positives:   Upcoming invasive procedure, Change in diet/appetite, Other   complaints    Negatives:   Signs/symptoms of thrombosis, Signs/symptoms of bleeding,   Laboratory test error suspected, Change in health, Change in alcohol use,   Change in activity, Emergency department visit, Upcoming dental procedure,   Missed doses, Extra doses, Change in medications, Hospital admission,   Bruising    Comments:   Cranberry juice last couple weeks. Decreased appetite/vit k   past month. Reports some fatigue, but improving reportedly. Reports dental   extraction upcoming (pending scheduling), requiring 3-day hold of warfarin   per proceduralist/Dr. Mast.      Clinical Outcomes     Negatives:   Major bleeding event, Thromboembolic event,   Anticoagulation-related hospital admission, Anticoagulation-related ED   visit, Anticoagulation-related fatality    Comments:   Cranberry juice last couple weeks. Decreased appetite/vit k   past month. Reports some fatigue, but improving reportedly. Reports dental   extraction upcoming (pending scheduling), requiring 3-day hold of warfarin   per proceduralist/  Pro.        INR History:  Anticoagulation Monitoring 3/25/2019 4/29/2019 7/3/2019   INR 2.5 2.8 4.8   INR Date 3/25/2019 4/29/2019 7/3/2019   INR Goal 2.0-3.0 2.0-3.0 2.0-3.0   Trend Same Same Same   Last Week Total 25 mg 25 mg 25 mg   Next Week Total 25 mg 25 mg 17.5 mg   Sun 5 mg 5 mg 5 mg   Mon 2.5 mg 2.5 mg 2.5 mg   Tue 5 mg 5 mg 5 mg   Wed 2.5 mg 2.5 mg Hold (7/3)   Thu 5 mg 5 mg Hold (7/4)   Fri 2.5 mg 2.5 mg 2.5 mg   Sat 2.5 mg 2.5 mg 2.5 mg   Visit Report - - -   Some recent data might be hidden       Plan:  1. INR is Supratherapeutic today- see above in Anticoagulation Summary.  Will instruct Hernando Caro to Change their warfarin regimen- see above in Anticoagulation Summary.  2. Follow up in 1 week  3. Patient declines warfarin refills.  4. Verbal and written information provided. Patient expresses understanding and has no further questions at this time.    Anthony Topete [facility]

## 2019-07-10 ENCOUNTER — ANTICOAGULATION VISIT (OUTPATIENT)
Dept: PHARMACY | Facility: HOSPITAL | Age: 59
End: 2019-07-10

## 2019-07-10 DIAGNOSIS — Z79.01 LONG TERM (CURRENT) USE OF ANTICOAGULANTS: ICD-10-CM

## 2019-07-10 LAB
INR PPP: 3.9 (ref 0.91–1.09)
PROTHROMBIN TIME: 47.1 SECONDS (ref 10–13.8)

## 2019-07-10 PROCEDURE — G0463 HOSPITAL OUTPT CLINIC VISIT: HCPCS

## 2019-07-10 PROCEDURE — 85610 PROTHROMBIN TIME: CPT

## 2019-07-10 PROCEDURE — 36416 COLLJ CAPILLARY BLOOD SPEC: CPT

## 2019-07-10 NOTE — PROGRESS NOTES
Anticoagulation Clinic Progress Note    Anticoagulation Summary  As of 7/10/2019    INR goal:   2.0-3.0   TTR:   35.5 % (8.8 mo)   INR used for dosing:   3.9! (7/10/2019)   Warfarin maintenance plan:   5 mg every Sun, Thu; 2.5 mg all other days   Weekly warfarin total:   22.5 mg   Plan last modified:   Jil Lerner RPH (7/10/2019)   Next INR check:   7/24/2019   Priority:   High   Target end date:   Indefinite    Indications    Long term (current) use of anticoagulants [Z79.01]             Anticoagulation Episode Summary     INR check location:       Preferred lab:       Send INR reminders to:    [facility]    Comments:         Anticoagulation Care Providers     Provider Role Specialty Phone number    Amrit Mast MD Referring Cardiology [phone]          Clinic Interview:  Patient Findings     Negatives:   Signs/symptoms of thrombosis, Signs/symptoms of bleeding,   Laboratory test error suspected, Change in health, Change in alcohol use,   Change in activity, Upcoming invasive procedure, Emergency department   visit, Upcoming dental procedure, Missed doses, Extra doses, Change in   medications, Change in diet/appetite, Hospital admission, Bruising, Other   complaints      Clinical Outcomes     Negatives:   Major bleeding event, Thromboembolic event,   Anticoagulation-related hospital admission, Anticoagulation-related ED   visit, Anticoagulation-related fatality        INR History:  Anticoagulation Monitoring 4/29/2019 7/3/2019 7/10/2019   INR 2.8 4.8 3.9   INR Date 4/29/2019 7/3/2019 7/10/2019   INR Goal 2.0-3.0 2.0-3.0 2.0-3.0   Trend Same Same Down   Last Week Total 25 mg 25 mg 17.5 mg   Next Week Total 25 mg 17.5 mg 17.5 mg   Sun 5 mg 5 mg 5 mg   Mon 2.5 mg 2.5 mg 2.5 mg   Tue 5 mg 5 mg 2.5 mg   Wed 2.5 mg Hold (7/3) Hold (7/10); Otherwise 2.5 mg   Thu 5 mg Hold (7/4) 2.5 mg (7/11); Otherwise 5 mg   Fri 2.5 mg 2.5 mg 2.5 mg   Sat 2.5 mg 2.5 mg 2.5 mg   Visit Report - - -   Some  recent data might be hidden       Plan:  1. INR is Therapeutic today- see above in Anticoagulation Summary.  Will instruct Hernando Caro to Change their warfarin regimen- see above in Anticoagulation Summary.  2. Follow up in 2 weeks  3. Patient declines warfarin refills.  4. Verbal and written information provided. Patient expresses understanding and has no further questions at this time.    Jil Lerner [facility]

## 2019-07-30 RX ORDER — ATORVASTATIN CALCIUM 10 MG/1
10 TABLET, FILM COATED ORAL NIGHTLY
Qty: 30 TABLET | Refills: 0 | Status: SHIPPED | OUTPATIENT
Start: 2019-07-30 | End: 2019-08-28 | Stop reason: SDUPTHER

## 2019-08-13 ENCOUNTER — TELEPHONE (OUTPATIENT)
Dept: PHARMACY | Facility: HOSPITAL | Age: 59
End: 2019-08-13

## 2019-08-14 ENCOUNTER — TELEPHONE (OUTPATIENT)
Dept: CARDIOLOGY | Facility: CLINIC | Age: 59
End: 2019-08-14

## 2019-08-14 NOTE — TELEPHONE ENCOUNTER
Pt left msg saying he cannot afford his meds and needs alternative but neglected to which ones.  I left pt a msg asking him to call me bk and let me know so I can ask Dr. Mast.    Mariela

## 2019-08-20 RX ORDER — WARFARIN SODIUM 5 MG/1
TABLET ORAL
Qty: 90 TABLET | Refills: 0 | Status: SHIPPED | OUTPATIENT
Start: 2019-08-20

## 2019-08-20 NOTE — TELEPHONE ENCOUNTER
Pt returned the call.  He states he cannot afford any of his medications due to losing his insurance the end of June.  I s/w Dr. Mast and gave Tammie Gordo the [facility] clinic number. /jlf

## 2019-08-29 RX ORDER — ATORVASTATIN CALCIUM 10 MG/1
10 TABLET, FILM COATED ORAL NIGHTLY
Qty: 30 TABLET | Refills: 0 | Status: SHIPPED | OUTPATIENT
Start: 2019-08-29

## 2019-09-06 ENCOUNTER — TELEPHONE (OUTPATIENT)
Dept: PHARMACY | Facility: HOSPITAL | Age: 59
End: 2019-09-06

## 2019-11-12 ENCOUNTER — TELEPHONE (OUTPATIENT)
Dept: PHARMACY | Facility: HOSPITAL | Age: 59
End: 2019-11-12

## 2019-12-13 ENCOUNTER — TELEPHONE (OUTPATIENT)
Dept: PHARMACY | Facility: HOSPITAL | Age: 59
End: 2019-12-13

## 2019-12-13 NOTE — TELEPHONE ENCOUNTER
Attempted to contact Mr. Caro without success. Unable to leave voicemail.     Noted previous telephone enocunter documents pt's inability to pay for any of his medications. Noted he had been given contact information for the [facility]. Will continue to try to contact to determine if his warfarin is being managed there.

## 2020-02-25 ENCOUNTER — ANTICOAGULATION VISIT (OUTPATIENT)
Dept: PHARMACY | Facility: HOSPITAL | Age: 60
End: 2020-02-25

## 2020-02-25 DIAGNOSIS — Z79.01 LONG TERM (CURRENT) USE OF ANTICOAGULANTS: ICD-10-CM

## 2020-02-25 NOTE — PROGRESS NOTES
This visit is for documentation purposes only: [facility] has been unsuccessful in contacting Mr. Caro by phone, and we have received no response to voicemails and three no-show letters. Last INR check was 7/10/19. Noted 8/14/19 telephone encounter documents pt's inability to pay for any of his medications, at which time it appears we was given contact information for the [facility]. We have attempted to confirm with patient that he has transferred under their care without success.     Due to inability to contact, he will be discharged from the [facility] at this time. If in the future Mr. Caro desires follow-up with the [facility], we would be happy to participate in his care.

## 2020-07-23 RX ORDER — FUROSEMIDE 80 MG
TABLET ORAL
Qty: 30 TABLET | Refills: 0 | OUTPATIENT
Start: 2020-07-23

## 2020-08-30 ENCOUNTER — INPATIENT HOSPITAL (OUTPATIENT)
Dept: URBAN - METROPOLITAN AREA HOSPITAL 107 | Facility: HOSPITAL | Age: 60
End: 2020-08-30
Payer: MEDICAID

## 2020-08-30 DIAGNOSIS — K59.00 CONSTIPATION, UNSPECIFIED: ICD-10-CM

## 2020-08-30 DIAGNOSIS — D50.0 IRON DEFICIENCY ANEMIA SECONDARY TO BLOOD LOSS (CHRONIC): ICD-10-CM

## 2020-08-30 DIAGNOSIS — K92.1 MELENA: ICD-10-CM

## 2020-08-30 DIAGNOSIS — D68.9 COAGULATION DEFECT, UNSPECIFIED: ICD-10-CM

## 2020-08-30 PROCEDURE — 99223 1ST HOSP IP/OBS HIGH 75: CPT | Performed by: INTERNAL MEDICINE

## 2020-08-31 ENCOUNTER — INPATIENT HOSPITAL (OUTPATIENT)
Dept: URBAN - METROPOLITAN AREA HOSPITAL 107 | Facility: HOSPITAL | Age: 60
End: 2020-08-31
Payer: MEDICAID

## 2020-08-31 DIAGNOSIS — K92.1 MELENA: ICD-10-CM

## 2020-08-31 DIAGNOSIS — K92.2 GASTROINTESTINAL HEMORRHAGE, UNSPECIFIED: ICD-10-CM

## 2020-08-31 DIAGNOSIS — R94.5 ABNORMAL RESULTS OF LIVER FUNCTION STUDIES: ICD-10-CM

## 2020-08-31 DIAGNOSIS — R74.0 NONSPECIFIC ELEVATION OF LEVELS OF TRANSAMINASE AND LACTIC A: ICD-10-CM

## 2020-08-31 DIAGNOSIS — D50.0 IRON DEFICIENCY ANEMIA SECONDARY TO BLOOD LOSS (CHRONIC): ICD-10-CM

## 2020-08-31 PROCEDURE — 99232 SBSQ HOSP IP/OBS MODERATE 35: CPT | Performed by: PHYSICIAN ASSISTANT

## 2020-09-01 PROCEDURE — 99232 SBSQ HOSP IP/OBS MODERATE 35: CPT | Performed by: PHYSICIAN ASSISTANT

## 2020-09-02 ENCOUNTER — INPATIENT HOSPITAL (OUTPATIENT)
Dept: URBAN - METROPOLITAN AREA HOSPITAL 107 | Facility: HOSPITAL | Age: 60
End: 2020-09-02
Payer: MEDICAID

## 2020-09-02 DIAGNOSIS — K64.0 FIRST DEGREE HEMORRHOIDS: ICD-10-CM

## 2020-09-02 DIAGNOSIS — K25.9 GASTRIC ULCER, UNSPECIFIED AS ACUTE OR CHRONIC, WITHOUT HEMO: ICD-10-CM

## 2020-09-02 DIAGNOSIS — K63.5 POLYP OF COLON: ICD-10-CM

## 2020-09-02 DIAGNOSIS — K52.9 NONINFECTIVE GASTROENTERITIS AND COLITIS, UNSPECIFIED: ICD-10-CM

## 2020-09-02 DIAGNOSIS — R19.5 OTHER FECAL ABNORMALITIES: ICD-10-CM

## 2020-09-02 DIAGNOSIS — K29.50 UNSPECIFIED CHRONIC GASTRITIS WITHOUT BLEEDING: ICD-10-CM

## 2020-09-02 DIAGNOSIS — D62 ACUTE POSTHEMORRHAGIC ANEMIA: ICD-10-CM

## 2020-09-02 PROCEDURE — 45385 COLONOSCOPY W/LESION REMOVAL: CPT | Performed by: INTERNAL MEDICINE

## 2021-03-22 ENCOUNTER — BULK ORDERING (OUTPATIENT)
Dept: CASE MANAGEMENT | Facility: OTHER | Age: 61
End: 2021-03-22

## 2021-03-22 DIAGNOSIS — Z23 IMMUNIZATION DUE: ICD-10-CM

## 2023-02-13 NOTE — PROGRESS NOTES
[facility]  Cardiology Progress Note    Patient Identification:  Name: Hernando Caro  Age: 57 y.o.  Sex: male  :  1960  MRN: [id number]                 Follow Up / Chief Complaint: Shortness of breath    Interval History:  Significantly improved with the diuretics as well as Dobutrex     Subjective:  Still have shortness of breath on exertion    Objective:    Past Medical History:  Past Medical History:   Diagnosis Date   • Bronchitis     2018   • Cardiomyopathy    • Chronic systolic CHF (congestive heart failure)    • CKD (chronic kidney disease)    • Ejection fraction < 50%    • Essential hypertension    • Mild CAD     Non-obstructive   • Mitral regurgitation    • Persistent atrial fibrillation     flutter   • SOB (shortness of breath)      Past Surgical History:  Past Surgical History:   Procedure Laterality Date   • BACK SURGERY     • CARDIAC CATHETERIZATION N/A 4/3/2018    Procedure: Coronary angiography;  Surgeon: Geovany Guadalupe MD;  Location: [facility] CATH INVASIVE LOCATION;  Service: Cardiovascular   • CARDIAC CATHETERIZATION N/A 4/3/2018    Procedure: Left heart cath;  Surgeon: Geovany Guadalupe MD;  Location: [facility] INVASIVE LOCATION;  Service: Cardiovascular   • CARDIAC CATHETERIZATION N/A 4/3/2018    Procedure: Left ventriculography;  Surgeon: Geovany Guadalupe MD;  Location: [facility] CATH INVASIVE LOCATION;  Service: Cardiovascular   • CARDIAC CATHETERIZATION N/A 4/3/2018    Procedure: Right heart cath;  Surgeon: Geovany Guadalupe MD;  Location: [facility] CATH INVASIVE LOCATION;  Service: Cardiovascular        Social History:   Social History   Substance Use Topics   • Smoking status: Former Smoker     Packs/day: 0.00     Years: 0.00     Quit date: 2018   • Smokeless tobacco: Never Used      Comment: caffeine 1 cup day   • Alcohol use Yes      Comment: occasionial       Family History:  Family History   Problem Relation Age of Onset   • Congenital heart  disease Brother    • Hypertension Mother    • Thyroid disease Mother    • Lung disease Father    • No Known Problems Maternal Grandmother    • No Known Problems Maternal Grandfather    • No Known Problems Paternal Grandmother    • No Known Problems Paternal Grandfather    • Allergic rhinitis Sister    • Hypertension Brother    • No Known Problems Brother    • No Known Problems Brother           Allergies:  Allergies   Allergen Reactions   • Ace Inhibitors    • Lisinopril Angioedema     Scheduled Meds:    atorvastatin 40 mg Nightly   digoxin 125 mcg Daily   furosemide 40 mg TID   guaiFENesin 600 mg Q12H   ipratropium-albuterol 3 mL Q4H - RT   metoprolol tartrate 100 mg Q12H   pantoprazole 40 mg BID AC   potassium chloride 20 mEq BID With Meals   spironolactone 12.5 mg Daily   warfarin 5 mg Daily           INTAKE AND OUTPUT:    Intake/Output Summary (Last 24 hours) at 05/20/18 1203  Last data filed at 05/20/18 1009   Gross per 24 hour   Intake          1094.53 ml   Output             6000 ml   Net         -4905.47 ml       Review of Systems:   GI:  Cardiac:No chest pains  Pulmonary: Shortness of breath better    Constitutional:  Temp:  [98.2 °F (36.8 °C)-98.3 °F (36.8 °C)] 98.3 °F (36.8 °C)  Heart Rate:  [] 93  Resp:  [18-20] 20  BP: ()/(58-89) 84/58    Physical Exam:  General:  Appears in no acute distress  Eyes: PERTL,  HEENT:  No JVD. Thyroid not visibly enlarged. No mucosal pallor or cyanosis  Respiratory: Respirations regular and unlabored at rest. BBS with good air entry in all fields. No crackles, rubs or wheezes auscultated  Cardiovascular: S1S2 Regular rate and rhythm. No murmur, rub or gallop. No carotid bruits. DP/PT pulses     . No pretibial pitting edema  Gastrointestinal: Abdomen soft, flat, non tender. Bowel sounds present. No hepatosplenomegaly. No ascites  Musculoskeletal: GAONA x4. No abnormal movements  Extremities: No digital clubbing or cyanosis  Skin: Color pink. Skin warm and dry to  "touch. No rashes    Neuro: AAO x3 CN II-XII grossly intact  Psych: Mood and affect normal, pleasant and cooperative          Cardiographics  Telemetry:     ECG:   No acute changes personally reviewed  Echocardiogram:     Lab Review     Results from last 7 days  Lab Units 05/20/18  0533 05/19/18  0800   TROPONIN T ng/mL 0.032* 0.052*           Results from last 7 days  Lab Units 05/20/18  0533   SODIUM mmol/L 138   POTASSIUM mmol/L 3.7   BUN mg/dL 19   CREATININE mg/dL 1.67*   CALCIUM mg/dL 8.9     @[id number]@    Results from last 7 days  Lab Units 05/20/18  0533 05/19/18  0800   WBC 10*3/mm3 5.27 5.26   HEMOGLOBIN g/dL 12.4* 13.1*   HEMATOCRIT % 37.5* 38.6*   PLATELETS 10*3/mm3 302 297       Results from last 7 days  Lab Units 05/20/18  0533 05/19/18  0800   INR  2.06* 2.41*         Assessment:/ plan  1. Acute on chronic systolic congestive heart failure    Markedly improved with the Dobutrex as well as the diuretics we will increase the Dobutrex 5 mics    2.  Cardiomyopathy    3.  Chronic anticoagulation  INR 2.06    4.  Borderline elevated troponin    Check BMP in the morning and BNP in the morning          )5/20/2018  MD TONYA Nathan/Transcription:   \"Dictated utilizing Dragon dictation\".     " show

## (undated) DEVICE — BALN PRESS WEDGE 5F 110CM

## (undated) DEVICE — CATH DIAG IMPULSE FL3.5 5F 100CM

## (undated) DEVICE — GW EMR FIX EXCHG J STD .035 3MM 260CM

## (undated) DEVICE — CATH DIAG IMPULSE PIG 5F 100CM

## (undated) DEVICE — GLIDESHEATH BASIC HYDROPHILIC COATED INTRODUCER SHEATH: Brand: GLIDESHEATH

## (undated) DEVICE — KT MANIFLD CARDIAC

## (undated) DEVICE — LOU PACE DEFIB: Brand: MEDLINE INDUSTRIES, INC.

## (undated) DEVICE — CATH DIAG IMPULSE FR5 5F 100CM